# Patient Record
Sex: FEMALE | Race: WHITE | NOT HISPANIC OR LATINO
[De-identification: names, ages, dates, MRNs, and addresses within clinical notes are randomized per-mention and may not be internally consistent; named-entity substitution may affect disease eponyms.]

---

## 2017-07-26 ENCOUNTER — APPOINTMENT (OUTPATIENT)
Dept: ORTHOPEDIC SURGERY | Facility: CLINIC | Age: 72
End: 2017-07-26

## 2017-07-26 VITALS
SYSTOLIC BLOOD PRESSURE: 136 MMHG | WEIGHT: 170 LBS | HEART RATE: 73 BPM | BODY MASS INDEX: 26.37 KG/M2 | DIASTOLIC BLOOD PRESSURE: 75 MMHG | HEIGHT: 67.5 IN

## 2017-07-26 DIAGNOSIS — M21.6X9 OTHER ACQUIRED DEFORMITIES OF UNSPECIFIED FOOT: ICD-10-CM

## 2017-07-26 DIAGNOSIS — M25.572 PAIN IN LEFT ANKLE AND JOINTS OF LEFT FOOT: ICD-10-CM

## 2017-07-26 DIAGNOSIS — M21.40 FLAT FOOT [PES PLANUS] (ACQUIRED), UNSPECIFIED FOOT: ICD-10-CM

## 2017-07-28 ENCOUNTER — APPOINTMENT (OUTPATIENT)
Dept: MRI IMAGING | Facility: CLINIC | Age: 72
End: 2017-07-28
Payer: MEDICARE

## 2017-07-28 ENCOUNTER — OUTPATIENT (OUTPATIENT)
Dept: OUTPATIENT SERVICES | Facility: HOSPITAL | Age: 72
LOS: 1 days | End: 2017-07-28
Payer: MEDICARE

## 2017-07-28 DIAGNOSIS — Z00.8 ENCOUNTER FOR OTHER GENERAL EXAMINATION: ICD-10-CM

## 2017-07-28 PROCEDURE — 73721 MRI JNT OF LWR EXTRE W/O DYE: CPT | Mod: 26,LT

## 2017-07-28 PROCEDURE — 73721 MRI JNT OF LWR EXTRE W/O DYE: CPT

## 2017-07-31 ENCOUNTER — APPOINTMENT (OUTPATIENT)
Dept: OBGYN | Facility: CLINIC | Age: 72
End: 2017-07-31
Payer: MEDICARE

## 2017-07-31 ENCOUNTER — FORM ENCOUNTER (OUTPATIENT)
Age: 72
End: 2017-07-31

## 2017-07-31 VITALS — DIASTOLIC BLOOD PRESSURE: 82 MMHG | HEIGHT: 67.5 IN | SYSTOLIC BLOOD PRESSURE: 130 MMHG

## 2017-07-31 DIAGNOSIS — B97.7 PAPILLOMAVIRUS AS THE CAUSE OF DISEASES CLASSIFIED ELSEWHERE: ICD-10-CM

## 2017-07-31 DIAGNOSIS — Z01.810 ENCOUNTER FOR PREPROCEDURAL CARDIOVASCULAR EXAMINATION: ICD-10-CM

## 2017-07-31 PROCEDURE — G0101: CPT

## 2017-08-01 ENCOUNTER — APPOINTMENT (OUTPATIENT)
Dept: MAMMOGRAPHY | Facility: IMAGING CENTER | Age: 72
End: 2017-08-01
Payer: MEDICARE

## 2017-08-01 ENCOUNTER — OUTPATIENT (OUTPATIENT)
Dept: OUTPATIENT SERVICES | Facility: HOSPITAL | Age: 72
LOS: 1 days | End: 2017-08-01
Payer: MEDICARE

## 2017-08-01 DIAGNOSIS — Z12.31 ENCOUNTER FOR SCREENING MAMMOGRAM FOR MALIGNANT NEOPLASM OF BREAST: ICD-10-CM

## 2017-08-01 PROCEDURE — 77063 BREAST TOMOSYNTHESIS BI: CPT

## 2017-08-01 PROCEDURE — 77063 BREAST TOMOSYNTHESIS BI: CPT | Mod: 26

## 2017-08-01 PROCEDURE — G0202: CPT | Mod: 26

## 2017-08-01 PROCEDURE — 77067 SCR MAMMO BI INCL CAD: CPT

## 2017-08-02 ENCOUNTER — APPOINTMENT (OUTPATIENT)
Dept: ORTHOPEDIC SURGERY | Facility: CLINIC | Age: 72
End: 2017-08-02
Payer: MEDICARE

## 2017-08-02 DIAGNOSIS — M21.42 FLAT FOOT [PES PLANUS] (ACQUIRED), LEFT FOOT: ICD-10-CM

## 2017-08-02 PROCEDURE — 99214 OFFICE O/P EST MOD 30 MIN: CPT

## 2017-08-08 LAB — CYTOLOGY CVX/VAG DOC THIN PREP: NORMAL

## 2017-10-02 ENCOUNTER — OUTPATIENT (OUTPATIENT)
Dept: OUTPATIENT SERVICES | Facility: HOSPITAL | Age: 72
LOS: 1 days | End: 2017-10-02
Payer: MEDICARE

## 2017-10-02 VITALS
DIASTOLIC BLOOD PRESSURE: 74 MMHG | OXYGEN SATURATION: 96 % | WEIGHT: 195.11 LBS | HEART RATE: 73 BPM | TEMPERATURE: 99 F | SYSTOLIC BLOOD PRESSURE: 157 MMHG | HEIGHT: 67.5 IN | RESPIRATION RATE: 12 BRPM

## 2017-10-02 DIAGNOSIS — Z90.89 ACQUIRED ABSENCE OF OTHER ORGANS: Chronic | ICD-10-CM

## 2017-10-02 DIAGNOSIS — S85.219A: ICD-10-CM

## 2017-10-02 DIAGNOSIS — T14.8 OTHER INJURY OF UNSPECIFIED BODY REGION: ICD-10-CM

## 2017-10-02 DIAGNOSIS — M77.9 ENTHESOPATHY, UNSPECIFIED: Chronic | ICD-10-CM

## 2017-10-02 DIAGNOSIS — Z01.818 ENCOUNTER FOR OTHER PREPROCEDURAL EXAMINATION: ICD-10-CM

## 2017-10-02 DIAGNOSIS — Z98.890 OTHER SPECIFIED POSTPROCEDURAL STATES: Chronic | ICD-10-CM

## 2017-10-02 DIAGNOSIS — M65.9 SYNOVITIS AND TENOSYNOVITIS, UNSPECIFIED: ICD-10-CM

## 2017-10-02 DIAGNOSIS — Z90.49 ACQUIRED ABSENCE OF OTHER SPECIFIED PARTS OF DIGESTIVE TRACT: Chronic | ICD-10-CM

## 2017-10-02 NOTE — H&P PST ADULT - NSANTHOSAYNRD_GEN_A_CORE
No. VÍCTOR screening performed.  STOP BANG Legend: 0-2 = LOW Risk; 3-4 = INTERMEDIATE Risk; 5-8 = HIGH Risk

## 2017-10-02 NOTE — H&P PST ADULT - PROBLEM SELECTOR PLAN 1
coming in for operative repair left tibialis anterior tendon  insufficiency. Potential reconstruction procedure with allograft tendon

## 2017-10-02 NOTE — H&P PST ADULT - HISTORY OF PRESENT ILLNESS
72yr old female with ruptured tendon coming in for operative  repair left tibialis anterior tendon insufficiency. Potential  reconstruction procedure with allograft tendon

## 2017-10-04 ENCOUNTER — APPOINTMENT (OUTPATIENT)
Dept: CARDIOLOGY | Facility: CLINIC | Age: 72
End: 2017-10-04
Payer: MEDICARE

## 2017-10-04 ENCOUNTER — NON-APPOINTMENT (OUTPATIENT)
Age: 72
End: 2017-10-04

## 2017-10-04 VITALS
HEART RATE: 66 BPM | SYSTOLIC BLOOD PRESSURE: 147 MMHG | WEIGHT: 194 LBS | DIASTOLIC BLOOD PRESSURE: 66 MMHG | HEIGHT: 67 IN | BODY MASS INDEX: 30.45 KG/M2 | OXYGEN SATURATION: 97 %

## 2017-10-04 VITALS — SYSTOLIC BLOOD PRESSURE: 130 MMHG | DIASTOLIC BLOOD PRESSURE: 70 MMHG

## 2017-10-04 DIAGNOSIS — H26.9 UNSPECIFIED CATARACT: ICD-10-CM

## 2017-10-04 DIAGNOSIS — Z82.49 FAMILY HISTORY OF ISCHEMIC HEART DISEASE AND OTHER DISEASES OF THE CIRCULATORY SYSTEM: ICD-10-CM

## 2017-10-04 DIAGNOSIS — Z01.419 ENCOUNTER FOR GYNECOLOGICAL EXAMINATION (GENERAL) (ROUTINE) W/OUT ABNORMAL FINDINGS: ICD-10-CM

## 2017-10-04 PROCEDURE — 93000 ELECTROCARDIOGRAM COMPLETE: CPT

## 2017-10-04 PROCEDURE — 99214 OFFICE O/P EST MOD 30 MIN: CPT

## 2017-10-13 ENCOUNTER — OUTPATIENT (OUTPATIENT)
Dept: OUTPATIENT SERVICES | Facility: HOSPITAL | Age: 72
LOS: 1 days | End: 2017-10-13
Payer: MEDICARE

## 2017-10-13 ENCOUNTER — APPOINTMENT (OUTPATIENT)
Dept: ORTHOPEDIC SURGERY | Facility: HOSPITAL | Age: 72
End: 2017-10-13

## 2017-10-13 VITALS
DIASTOLIC BLOOD PRESSURE: 68 MMHG | TEMPERATURE: 98 F | SYSTOLIC BLOOD PRESSURE: 125 MMHG | HEART RATE: 75 BPM | OXYGEN SATURATION: 97 % | RESPIRATION RATE: 16 BRPM

## 2017-10-13 VITALS
HEART RATE: 86 BPM | OXYGEN SATURATION: 98 % | RESPIRATION RATE: 18 BRPM | WEIGHT: 195.11 LBS | SYSTOLIC BLOOD PRESSURE: 164 MMHG | HEIGHT: 67.5 IN | DIASTOLIC BLOOD PRESSURE: 78 MMHG | TEMPERATURE: 98 F

## 2017-10-13 DIAGNOSIS — M77.9 ENTHESOPATHY, UNSPECIFIED: Chronic | ICD-10-CM

## 2017-10-13 DIAGNOSIS — S85.219A: ICD-10-CM

## 2017-10-13 DIAGNOSIS — Z90.89 ACQUIRED ABSENCE OF OTHER ORGANS: Chronic | ICD-10-CM

## 2017-10-13 DIAGNOSIS — Z98.890 OTHER SPECIFIED POSTPROCEDURAL STATES: Chronic | ICD-10-CM

## 2017-10-13 DIAGNOSIS — Z90.49 ACQUIRED ABSENCE OF OTHER SPECIFIED PARTS OF DIGESTIVE TRACT: Chronic | ICD-10-CM

## 2017-10-13 DIAGNOSIS — M65.9 SYNOVITIS AND TENOSYNOVITIS, UNSPECIFIED: ICD-10-CM

## 2017-10-13 PROCEDURE — 76000 FLUOROSCOPY <1 HR PHYS/QHP: CPT

## 2017-10-13 PROCEDURE — 27691 REVISE LOWER LEG TENDON: CPT | Mod: LT

## 2017-10-13 PROCEDURE — G0463: CPT

## 2017-10-13 PROCEDURE — C1762: CPT

## 2017-10-13 PROCEDURE — C1713: CPT

## 2017-10-13 PROCEDURE — 27665 REPAIR OF LEG TENDON EACH: CPT | Mod: LT

## 2017-10-13 RX ORDER — SODIUM CHLORIDE 9 MG/ML
3 INJECTION INTRAMUSCULAR; INTRAVENOUS; SUBCUTANEOUS EVERY 8 HOURS
Qty: 0 | Refills: 0 | Status: DISCONTINUED | OUTPATIENT
Start: 2017-10-13 | End: 2017-10-13

## 2017-10-13 RX ORDER — HYDROMORPHONE HYDROCHLORIDE 2 MG/ML
0.25 INJECTION INTRAMUSCULAR; INTRAVENOUS; SUBCUTANEOUS
Qty: 0 | Refills: 0 | Status: DISCONTINUED | OUTPATIENT
Start: 2017-10-13 | End: 2017-10-13

## 2017-10-13 RX ORDER — OXYCODONE HYDROCHLORIDE 5 MG/1
1 TABLET ORAL
Qty: 28 | Refills: 0
Start: 2017-10-13 | End: 2017-10-20

## 2017-10-13 RX ORDER — ASPIRIN/CALCIUM CARB/MAGNESIUM 324 MG
1 TABLET ORAL
Qty: 30 | Refills: 0 | OUTPATIENT
Start: 2017-10-13 | End: 2017-11-12

## 2017-10-13 RX ORDER — OXYCODONE HYDROCHLORIDE 5 MG/1
1 TABLET ORAL
Qty: 28 | Refills: 0 | OUTPATIENT
Start: 2017-10-13 | End: 2017-10-20

## 2017-10-13 RX ORDER — ASPIRIN/CALCIUM CARB/MAGNESIUM 324 MG
1 TABLET ORAL
Qty: 30 | Refills: 0
Start: 2017-10-13 | End: 2017-11-12

## 2017-10-13 RX ORDER — FAMOTIDINE 10 MG/ML
0 INJECTION INTRAVENOUS
Qty: 0 | Refills: 0 | COMMUNITY

## 2017-10-13 NOTE — ASU DISCHARGE PLAN (ADULT/PEDIATRIC). - NOTIFY
Bleeding that does not stop/Swelling that continues/Numbness, tingling/Numbness, color, or temperature change to extremity/Fever greater than 101/Pain not relieved by Medications

## 2017-10-13 NOTE — BRIEF OPERATIVE NOTE - PROCEDURE
<<-----Click on this checkbox to enter Procedure Reconstruction of anterior or posterior tibialis tendon  10/13/2017  left  Active  KYNGSTROM

## 2017-10-13 NOTE — ASU DISCHARGE PLAN (ADULT/PEDIATRIC). - MEDICATION SUMMARY - MEDICATIONS TO TAKE
I will START or STAY ON the medications listed below when I get home from the hospital:    Ecotrin 325 mg oral delayed release tablet  -- 1 tab(s) by mouth once a day for DVT ppx  -- Swallow whole.  Do not crush.  Take with food or milk.    -- Indication: For dvt ppx    acetaminophen-oxyCODONE 325 mg-5 mg oral tablet  -- 1 tab(s) by mouth every 6 hours, As Needed MDD:4 for pain  -- Caution federal law prohibits the transfer of this drug to any person other  than the person for whom it was prescribed.  May cause drowsiness.  Alcohol may intensify this effect.  Use care when operating dangerous machinery.  This prescription cannot be refilled.  This product contains acetaminophen.  Do not use  with any other product containing acetaminophen to prevent possible liver damage.  Using more of this medication than prescribed may cause serious breathing problems.    -- Indication: For prn pain    Advair Diskus 100 mcg-50 mcg inhalation powder  -- 1 puff(s) inhaled 2 times a day  -- Indication: For per pmd

## 2017-10-13 NOTE — ASU DISCHARGE PLAN (ADULT/PEDIATRIC). - MEDICATION SUMMARY - MEDICATIONS TO STOP TAKING
I will STOP taking the medications listed below when I get home from the hospital:    famotidine 20 mg oral tablet  -- orally once a day pm and am of surgery

## 2017-10-16 ENCOUNTER — TRANSCRIPTION ENCOUNTER (OUTPATIENT)
Age: 72
End: 2017-10-16

## 2017-10-25 ENCOUNTER — APPOINTMENT (OUTPATIENT)
Dept: ORTHOPEDIC SURGERY | Facility: CLINIC | Age: 72
End: 2017-10-25
Payer: MEDICARE

## 2017-10-25 PROCEDURE — 73630 X-RAY EXAM OF FOOT: CPT | Mod: LT

## 2017-10-25 PROCEDURE — 99024 POSTOP FOLLOW-UP VISIT: CPT

## 2017-10-25 RX ORDER — HYDROCODONE BITARTRATE AND ACETAMINOPHEN 5; 325 MG/1; MG/1
5-325 TABLET ORAL
Qty: 5 | Refills: 0 | Status: COMPLETED | COMMUNITY
Start: 2017-04-29

## 2017-10-25 RX ORDER — OXYCODONE AND ACETAMINOPHEN 5; 325 MG/1; MG/1
5-325 TABLET ORAL
Qty: 28 | Refills: 0 | Status: ACTIVE | COMMUNITY
Start: 2017-10-13

## 2017-10-25 RX ORDER — IBUPROFEN 600 MG/1
600 TABLET, FILM COATED ORAL
Qty: 30 | Refills: 0 | Status: ACTIVE | COMMUNITY
Start: 2017-04-29

## 2017-10-30 ENCOUNTER — TRANSCRIPTION ENCOUNTER (OUTPATIENT)
Age: 72
End: 2017-10-30

## 2017-11-22 ENCOUNTER — APPOINTMENT (OUTPATIENT)
Dept: ORTHOPEDIC SURGERY | Facility: CLINIC | Age: 72
End: 2017-11-22
Payer: MEDICARE

## 2017-11-22 PROCEDURE — 29405 APPL SHORT LEG CAST: CPT | Mod: 58

## 2017-11-22 PROCEDURE — 99024 POSTOP FOLLOW-UP VISIT: CPT

## 2017-11-27 NOTE — H&P PST ADULT - PSH
abdominal pain
Bone spur  Lt foot 5th digit 2014  H/O bilateral breast biopsy  2002  History of cholecystectomy  2000  S/P tonsillectomy  childhood

## 2017-12-13 ENCOUNTER — APPOINTMENT (OUTPATIENT)
Dept: ORTHOPEDIC SURGERY | Facility: CLINIC | Age: 72
End: 2017-12-13
Payer: MEDICARE

## 2017-12-13 PROCEDURE — 99024 POSTOP FOLLOW-UP VISIT: CPT

## 2018-01-10 ENCOUNTER — APPOINTMENT (OUTPATIENT)
Dept: ORTHOPEDIC SURGERY | Facility: CLINIC | Age: 73
End: 2018-01-10
Payer: MEDICARE

## 2018-01-10 DIAGNOSIS — M66.9 SPONTANEOUS RUPTURE OF UNSPECIFIED TENDON: ICD-10-CM

## 2018-01-10 PROCEDURE — 99024 POSTOP FOLLOW-UP VISIT: CPT

## 2018-02-28 ENCOUNTER — APPOINTMENT (OUTPATIENT)
Dept: ORTHOPEDIC SURGERY | Facility: CLINIC | Age: 73
End: 2018-02-28
Payer: MEDICARE

## 2018-02-28 PROCEDURE — 99213 OFFICE O/P EST LOW 20 MIN: CPT

## 2018-04-11 ENCOUNTER — APPOINTMENT (OUTPATIENT)
Dept: ORTHOPEDIC SURGERY | Facility: CLINIC | Age: 73
End: 2018-04-11
Payer: MEDICARE

## 2018-04-11 VITALS
SYSTOLIC BLOOD PRESSURE: 155 MMHG | WEIGHT: 194 LBS | HEIGHT: 67 IN | HEART RATE: 83 BPM | BODY MASS INDEX: 30.45 KG/M2 | DIASTOLIC BLOOD PRESSURE: 73 MMHG

## 2018-04-11 DIAGNOSIS — M25.562 PAIN IN LEFT KNEE: ICD-10-CM

## 2018-04-11 PROCEDURE — 99214 OFFICE O/P EST MOD 30 MIN: CPT

## 2018-04-11 PROCEDURE — 73564 X-RAY EXAM KNEE 4 OR MORE: CPT | Mod: LT

## 2018-04-11 RX ORDER — BROMFENAC SODIUM 0.7 MG/ML
0.07 SOLUTION/ DROPS OPHTHALMIC
Qty: 3 | Refills: 0 | Status: ACTIVE | COMMUNITY
Start: 2018-03-08

## 2018-04-11 RX ORDER — CYCLOPENTOLATE HYDROCHLORIDE 20 MG/ML
2 SOLUTION/ DROPS OPHTHALMIC
Qty: 2 | Refills: 0 | Status: ACTIVE | COMMUNITY
Start: 2018-03-08

## 2018-04-11 RX ORDER — PREDNISOLONE ACETATE 10 MG/ML
1 SUSPENSION/ DROPS OPHTHALMIC
Qty: 5 | Refills: 0 | Status: ACTIVE | COMMUNITY
Start: 2018-03-12

## 2018-04-11 RX ORDER — MOXIFLOXACIN OPHTHALMIC 5 MG/ML
0.5 SOLUTION/ DROPS OPHTHALMIC
Qty: 3 | Refills: 0 | Status: ACTIVE | COMMUNITY
Start: 2018-03-26

## 2018-04-19 ENCOUNTER — APPOINTMENT (OUTPATIENT)
Dept: MRI IMAGING | Facility: CLINIC | Age: 73
End: 2018-04-19
Payer: MEDICARE

## 2018-04-19 ENCOUNTER — OUTPATIENT (OUTPATIENT)
Dept: OUTPATIENT SERVICES | Facility: HOSPITAL | Age: 73
LOS: 1 days | End: 2018-04-19
Payer: MEDICARE

## 2018-04-19 DIAGNOSIS — Z98.890 OTHER SPECIFIED POSTPROCEDURAL STATES: Chronic | ICD-10-CM

## 2018-04-19 DIAGNOSIS — M77.9 ENTHESOPATHY, UNSPECIFIED: Chronic | ICD-10-CM

## 2018-04-19 DIAGNOSIS — Z90.49 ACQUIRED ABSENCE OF OTHER SPECIFIED PARTS OF DIGESTIVE TRACT: Chronic | ICD-10-CM

## 2018-04-19 DIAGNOSIS — Z90.89 ACQUIRED ABSENCE OF OTHER ORGANS: Chronic | ICD-10-CM

## 2018-04-19 DIAGNOSIS — Z00.8 ENCOUNTER FOR OTHER GENERAL EXAMINATION: ICD-10-CM

## 2018-04-19 PROCEDURE — 73721 MRI JNT OF LWR EXTRE W/O DYE: CPT | Mod: 26,LT

## 2018-04-19 PROCEDURE — 73721 MRI JNT OF LWR EXTRE W/O DYE: CPT

## 2018-04-23 ENCOUNTER — APPOINTMENT (OUTPATIENT)
Dept: ORTHOPEDIC SURGERY | Facility: CLINIC | Age: 73
End: 2018-04-23
Payer: MEDICARE

## 2018-04-23 DIAGNOSIS — Z78.9 OTHER SPECIFIED HEALTH STATUS: ICD-10-CM

## 2018-04-23 DIAGNOSIS — S83.207A UNSPECIFIED TEAR OF UNSPECIFIED MENISCUS, CURRENT INJURY, LEFT KNEE, INITIAL ENCOUNTER: ICD-10-CM

## 2018-04-23 DIAGNOSIS — Z80.3 FAMILY HISTORY OF MALIGNANT NEOPLASM OF BREAST: ICD-10-CM

## 2018-04-23 DIAGNOSIS — Z87.891 PERSONAL HISTORY OF NICOTINE DEPENDENCE: ICD-10-CM

## 2018-04-23 DIAGNOSIS — Z82.62 FAMILY HISTORY OF OSTEOPOROSIS: ICD-10-CM

## 2018-04-23 PROCEDURE — 99213 OFFICE O/P EST LOW 20 MIN: CPT | Mod: 25

## 2018-04-23 PROCEDURE — 20610 DRAIN/INJ JOINT/BURSA W/O US: CPT | Mod: LT

## 2018-04-24 ENCOUNTER — MED ADMIN CHARGE (OUTPATIENT)
Age: 73
End: 2018-04-24

## 2018-04-25 ENCOUNTER — APPOINTMENT (OUTPATIENT)
Dept: ORTHOPEDIC SURGERY | Facility: CLINIC | Age: 73
End: 2018-04-25
Payer: MEDICARE

## 2018-04-25 DIAGNOSIS — S86.219A STRAIN OF MUSCLE(S) AND TENDON(S) OF ANTERIOR MUSCLE GROUP AT LOWER LEG LEVEL, UNSPECIFIED LEG, INITIAL ENCOUNTER: ICD-10-CM

## 2018-04-25 DIAGNOSIS — S86.812D STRAIN OF OTHER MUSCLE(S) AND TENDON(S) AT LOWER LEG LEVEL, LEFT LEG, SUBSEQUENT ENCOUNTER: ICD-10-CM

## 2018-04-25 PROCEDURE — 99213 OFFICE O/P EST LOW 20 MIN: CPT

## 2018-06-12 PROBLEM — M25.561 ACUTE PAIN OF RIGHT KNEE: Status: ACTIVE | Noted: 2018-06-12

## 2018-06-13 ENCOUNTER — APPOINTMENT (OUTPATIENT)
Dept: ORTHOPEDIC SURGERY | Facility: CLINIC | Age: 73
End: 2018-06-13
Payer: MEDICARE

## 2018-06-13 VITALS — BODY MASS INDEX: 32.39 KG/M2 | WEIGHT: 206.4 LBS | HEIGHT: 67 IN

## 2018-06-13 DIAGNOSIS — M17.11 UNILATERAL PRIMARY OSTEOARTHRITIS, RIGHT KNEE: ICD-10-CM

## 2018-06-13 DIAGNOSIS — M25.561 PAIN IN RIGHT KNEE: ICD-10-CM

## 2018-06-13 PROCEDURE — 73564 X-RAY EXAM KNEE 4 OR MORE: CPT | Mod: RT

## 2018-06-13 PROCEDURE — 20610 DRAIN/INJ JOINT/BURSA W/O US: CPT | Mod: RT

## 2018-06-13 PROCEDURE — 99214 OFFICE O/P EST MOD 30 MIN: CPT | Mod: 25

## 2018-06-13 RX ORDER — MELOXICAM 15 MG/1
15 TABLET ORAL DAILY
Qty: 30 | Refills: 2 | Status: ACTIVE | COMMUNITY
Start: 2018-06-13 | End: 1900-01-01

## 2018-07-03 ENCOUNTER — APPOINTMENT (OUTPATIENT)
Dept: ORTHOPEDIC SURGERY | Facility: CLINIC | Age: 73
End: 2018-07-03
Payer: MEDICARE

## 2018-07-03 VITALS — HEIGHT: 67 IN | WEIGHT: 206 LBS | BODY MASS INDEX: 32.33 KG/M2

## 2018-07-03 DIAGNOSIS — M70.51 OTHER BURSITIS OF KNEE, RIGHT KNEE: ICD-10-CM

## 2018-07-03 PROCEDURE — 20610 DRAIN/INJ JOINT/BURSA W/O US: CPT | Mod: RT

## 2018-07-03 PROCEDURE — 99213 OFFICE O/P EST LOW 20 MIN: CPT | Mod: 25

## 2018-07-12 ENCOUNTER — CHART COPY (OUTPATIENT)
Age: 73
End: 2018-07-12

## 2018-08-02 ENCOUNTER — FORM ENCOUNTER (OUTPATIENT)
Age: 73
End: 2018-08-02

## 2018-08-03 ENCOUNTER — APPOINTMENT (OUTPATIENT)
Dept: OBGYN | Facility: CLINIC | Age: 73
End: 2018-08-03
Payer: MEDICARE

## 2018-08-03 ENCOUNTER — OUTPATIENT (OUTPATIENT)
Dept: OUTPATIENT SERVICES | Facility: HOSPITAL | Age: 73
LOS: 1 days | End: 2018-08-03
Payer: MEDICARE

## 2018-08-03 ENCOUNTER — APPOINTMENT (OUTPATIENT)
Dept: MAMMOGRAPHY | Facility: IMAGING CENTER | Age: 73
End: 2018-08-03
Payer: MEDICARE

## 2018-08-03 VITALS
HEIGHT: 68 IN | SYSTOLIC BLOOD PRESSURE: 156 MMHG | BODY MASS INDEX: 29.4 KG/M2 | WEIGHT: 194 LBS | DIASTOLIC BLOOD PRESSURE: 62 MMHG

## 2018-08-03 DIAGNOSIS — Z86.19 PERSONAL HISTORY OF OTHER INFECTIOUS AND PARASITIC DISEASES: ICD-10-CM

## 2018-08-03 DIAGNOSIS — Z90.89 ACQUIRED ABSENCE OF OTHER ORGANS: Chronic | ICD-10-CM

## 2018-08-03 DIAGNOSIS — Z00.00 ENCOUNTER FOR GENERAL ADULT MEDICAL EXAMINATION WITHOUT ABNORMAL FINDINGS: ICD-10-CM

## 2018-08-03 DIAGNOSIS — M77.9 ENTHESOPATHY, UNSPECIFIED: Chronic | ICD-10-CM

## 2018-08-03 DIAGNOSIS — Z90.49 ACQUIRED ABSENCE OF OTHER SPECIFIED PARTS OF DIGESTIVE TRACT: Chronic | ICD-10-CM

## 2018-08-03 DIAGNOSIS — Z86.12 PERSONAL HISTORY OF POLIOMYELITIS: ICD-10-CM

## 2018-08-03 DIAGNOSIS — Z98.890 OTHER SPECIFIED POSTPROCEDURAL STATES: Chronic | ICD-10-CM

## 2018-08-03 PROBLEM — J44.9 CHRONIC OBSTRUCTIVE PULMONARY DISEASE, UNSPECIFIED: Chronic | Status: ACTIVE | Noted: 2017-10-02

## 2018-08-03 PROCEDURE — 77067 SCR MAMMO BI INCL CAD: CPT | Mod: 26

## 2018-08-03 PROCEDURE — 77067 SCR MAMMO BI INCL CAD: CPT

## 2018-08-03 PROCEDURE — 77063 BREAST TOMOSYNTHESIS BI: CPT

## 2018-08-03 PROCEDURE — 77063 BREAST TOMOSYNTHESIS BI: CPT | Mod: 26

## 2018-08-03 PROCEDURE — G0101: CPT

## 2018-08-06 ENCOUNTER — FORM ENCOUNTER (OUTPATIENT)
Age: 73
End: 2018-08-06

## 2018-08-07 ENCOUNTER — APPOINTMENT (OUTPATIENT)
Dept: MAMMOGRAPHY | Facility: IMAGING CENTER | Age: 73
End: 2018-08-07
Payer: MEDICARE

## 2018-08-07 ENCOUNTER — OUTPATIENT (OUTPATIENT)
Dept: OUTPATIENT SERVICES | Facility: HOSPITAL | Age: 73
LOS: 1 days | End: 2018-08-07
Payer: MEDICARE

## 2018-08-07 ENCOUNTER — APPOINTMENT (OUTPATIENT)
Dept: ULTRASOUND IMAGING | Facility: IMAGING CENTER | Age: 73
End: 2018-08-07
Payer: MEDICARE

## 2018-08-07 DIAGNOSIS — Z98.890 OTHER SPECIFIED POSTPROCEDURAL STATES: Chronic | ICD-10-CM

## 2018-08-07 DIAGNOSIS — Z90.89 ACQUIRED ABSENCE OF OTHER ORGANS: Chronic | ICD-10-CM

## 2018-08-07 DIAGNOSIS — Z90.49 ACQUIRED ABSENCE OF OTHER SPECIFIED PARTS OF DIGESTIVE TRACT: Chronic | ICD-10-CM

## 2018-08-07 DIAGNOSIS — M77.9 ENTHESOPATHY, UNSPECIFIED: Chronic | ICD-10-CM

## 2018-08-07 DIAGNOSIS — R92.8 OTHER ABNORMAL AND INCONCLUSIVE FINDINGS ON DIAGNOSTIC IMAGING OF BREAST: ICD-10-CM

## 2018-08-07 PROCEDURE — 77065 DX MAMMO INCL CAD UNI: CPT | Mod: 26,RT

## 2018-08-07 PROCEDURE — 76642 ULTRASOUND BREAST LIMITED: CPT

## 2018-08-07 PROCEDURE — G0279: CPT | Mod: 26

## 2018-08-07 PROCEDURE — G0279: CPT

## 2018-08-07 PROCEDURE — 77065 DX MAMMO INCL CAD UNI: CPT

## 2018-08-07 PROCEDURE — 76642 ULTRASOUND BREAST LIMITED: CPT | Mod: 26,RT

## 2019-03-09 ENCOUNTER — TRANSCRIPTION ENCOUNTER (OUTPATIENT)
Age: 74
End: 2019-03-09

## 2019-07-07 PROBLEM — M21.42 ACQUIRED LEFT FLAT FOOT: Status: ACTIVE | Noted: 2017-07-26

## 2019-07-07 PROBLEM — M21.40 ACQUIRED PES PLANUS: Status: ACTIVE | Noted: 2017-07-26

## 2019-08-08 ENCOUNTER — FORM ENCOUNTER (OUTPATIENT)
Age: 74
End: 2019-08-08

## 2019-08-09 ENCOUNTER — OUTPATIENT (OUTPATIENT)
Dept: OUTPATIENT SERVICES | Facility: HOSPITAL | Age: 74
LOS: 1 days | End: 2019-08-09
Payer: MEDICARE

## 2019-08-09 ENCOUNTER — APPOINTMENT (OUTPATIENT)
Dept: MAMMOGRAPHY | Facility: CLINIC | Age: 74
End: 2019-08-09

## 2019-08-09 DIAGNOSIS — Z00.8 ENCOUNTER FOR OTHER GENERAL EXAMINATION: ICD-10-CM

## 2019-08-09 DIAGNOSIS — Z90.49 ACQUIRED ABSENCE OF OTHER SPECIFIED PARTS OF DIGESTIVE TRACT: Chronic | ICD-10-CM

## 2019-08-09 DIAGNOSIS — Z90.89 ACQUIRED ABSENCE OF OTHER ORGANS: Chronic | ICD-10-CM

## 2019-08-09 DIAGNOSIS — Z98.890 OTHER SPECIFIED POSTPROCEDURAL STATES: Chronic | ICD-10-CM

## 2019-08-09 DIAGNOSIS — M77.9 ENTHESOPATHY, UNSPECIFIED: Chronic | ICD-10-CM

## 2019-08-09 PROCEDURE — 77067 SCR MAMMO BI INCL CAD: CPT

## 2019-08-09 PROCEDURE — 77063 BREAST TOMOSYNTHESIS BI: CPT | Mod: 26

## 2019-08-09 PROCEDURE — 77067 SCR MAMMO BI INCL CAD: CPT | Mod: 26

## 2019-08-09 PROCEDURE — 77063 BREAST TOMOSYNTHESIS BI: CPT

## 2020-08-18 ENCOUNTER — APPOINTMENT (OUTPATIENT)
Dept: OBGYN | Facility: CLINIC | Age: 75
End: 2020-08-18
Payer: MEDICARE

## 2020-08-18 VITALS
BODY MASS INDEX: 26.27 KG/M2 | WEIGHT: 173.31 LBS | HEIGHT: 68 IN | SYSTOLIC BLOOD PRESSURE: 147 MMHG | DIASTOLIC BLOOD PRESSURE: 87 MMHG

## 2020-08-18 DIAGNOSIS — Z01.810 ENCOUNTER FOR PREPROCEDURAL CARDIOVASCULAR EXAMINATION: ICD-10-CM

## 2020-08-18 DIAGNOSIS — Z01.419 ENCOUNTER FOR GYNECOLOGICAL EXAMINATION (GENERAL) (ROUTINE) W/OUT ABNORMAL FINDINGS: ICD-10-CM

## 2020-08-18 PROCEDURE — G0101: CPT

## 2020-08-19 ENCOUNTER — APPOINTMENT (OUTPATIENT)
Dept: MAMMOGRAPHY | Facility: CLINIC | Age: 75
End: 2020-08-19
Payer: MEDICARE

## 2020-08-19 ENCOUNTER — OUTPATIENT (OUTPATIENT)
Dept: OUTPATIENT SERVICES | Facility: HOSPITAL | Age: 75
LOS: 1 days | End: 2020-08-19
Payer: MEDICARE

## 2020-08-19 ENCOUNTER — RESULT REVIEW (OUTPATIENT)
Age: 75
End: 2020-08-19

## 2020-08-19 DIAGNOSIS — Z90.89 ACQUIRED ABSENCE OF OTHER ORGANS: Chronic | ICD-10-CM

## 2020-08-19 DIAGNOSIS — Z98.890 OTHER SPECIFIED POSTPROCEDURAL STATES: Chronic | ICD-10-CM

## 2020-08-19 DIAGNOSIS — Z00.8 ENCOUNTER FOR OTHER GENERAL EXAMINATION: ICD-10-CM

## 2020-08-19 DIAGNOSIS — M77.9 ENTHESOPATHY, UNSPECIFIED: Chronic | ICD-10-CM

## 2020-08-19 DIAGNOSIS — Z90.49 ACQUIRED ABSENCE OF OTHER SPECIFIED PARTS OF DIGESTIVE TRACT: Chronic | ICD-10-CM

## 2020-08-19 PROCEDURE — 77063 BREAST TOMOSYNTHESIS BI: CPT | Mod: 26

## 2020-08-19 PROCEDURE — 77067 SCR MAMMO BI INCL CAD: CPT

## 2020-08-19 PROCEDURE — 77063 BREAST TOMOSYNTHESIS BI: CPT

## 2020-08-19 PROCEDURE — 77067 SCR MAMMO BI INCL CAD: CPT | Mod: 26

## 2020-08-20 ENCOUNTER — APPOINTMENT (OUTPATIENT)
Dept: ORTHOPEDIC SURGERY | Facility: CLINIC | Age: 75
End: 2020-08-20
Payer: MEDICARE

## 2020-08-20 PROCEDURE — 99203 OFFICE O/P NEW LOW 30 MIN: CPT | Mod: 25

## 2020-08-20 PROCEDURE — 20550 NJX 1 TENDON SHEATH/LIGAMENT: CPT | Mod: F7

## 2020-08-24 LAB — CYTOLOGY CVX/VAG DOC THIN PREP: ABNORMAL

## 2020-12-23 PROBLEM — Z01.419 ENCOUNTER FOR GYNECOLOGICAL EXAMINATION: Status: RESOLVED | Noted: 2020-08-18 | Resolved: 2020-12-23

## 2021-04-29 ENCOUNTER — NON-APPOINTMENT (OUTPATIENT)
Age: 76
End: 2021-04-29

## 2021-04-29 ENCOUNTER — APPOINTMENT (OUTPATIENT)
Dept: ORTHOPEDIC SURGERY | Facility: CLINIC | Age: 76
End: 2021-04-29
Payer: MEDICARE

## 2021-04-29 DIAGNOSIS — M65.331 TRIGGER FINGER, RIGHT MIDDLE FINGER: ICD-10-CM

## 2021-04-29 DIAGNOSIS — M24.542 CONTRACTURE, LEFT HAND: ICD-10-CM

## 2021-04-29 DIAGNOSIS — M65.332 TRIGGER FINGER, LEFT MIDDLE FINGER: ICD-10-CM

## 2021-04-29 PROCEDURE — 20550 NJX 1 TENDON SHEATH/LIGAMENT: CPT | Mod: F2

## 2021-04-29 PROCEDURE — 99214 OFFICE O/P EST MOD 30 MIN: CPT | Mod: 25

## 2021-08-16 DIAGNOSIS — Z00.00 ENCOUNTER FOR GENERAL ADULT MEDICAL EXAMINATION W/OUT ABNORMAL FINDINGS: ICD-10-CM

## 2021-08-24 ENCOUNTER — OUTPATIENT (OUTPATIENT)
Dept: OUTPATIENT SERVICES | Facility: HOSPITAL | Age: 76
LOS: 1 days | End: 2021-08-24
Payer: MEDICARE

## 2021-08-24 ENCOUNTER — APPOINTMENT (OUTPATIENT)
Dept: MAMMOGRAPHY | Facility: CLINIC | Age: 76
End: 2021-08-24
Payer: MEDICARE

## 2021-08-24 ENCOUNTER — RESULT REVIEW (OUTPATIENT)
Age: 76
End: 2021-08-24

## 2021-08-24 DIAGNOSIS — Z90.89 ACQUIRED ABSENCE OF OTHER ORGANS: Chronic | ICD-10-CM

## 2021-08-24 DIAGNOSIS — Z98.890 OTHER SPECIFIED POSTPROCEDURAL STATES: Chronic | ICD-10-CM

## 2021-08-24 DIAGNOSIS — Z90.49 ACQUIRED ABSENCE OF OTHER SPECIFIED PARTS OF DIGESTIVE TRACT: Chronic | ICD-10-CM

## 2021-08-24 DIAGNOSIS — M77.9 ENTHESOPATHY, UNSPECIFIED: Chronic | ICD-10-CM

## 2021-08-24 DIAGNOSIS — Z00.00 ENCOUNTER FOR GENERAL ADULT MEDICAL EXAMINATION WITHOUT ABNORMAL FINDINGS: ICD-10-CM

## 2021-08-24 PROCEDURE — 77067 SCR MAMMO BI INCL CAD: CPT | Mod: 26

## 2021-08-24 PROCEDURE — 77063 BREAST TOMOSYNTHESIS BI: CPT | Mod: 26

## 2021-08-24 PROCEDURE — 77063 BREAST TOMOSYNTHESIS BI: CPT

## 2021-08-24 PROCEDURE — 77067 SCR MAMMO BI INCL CAD: CPT

## 2022-01-01 ENCOUNTER — INPATIENT (INPATIENT)
Facility: HOSPITAL | Age: 77
LOS: 14 days | DRG: 175 | End: 2022-09-28
Attending: INTERNAL MEDICINE | Admitting: INTERNAL MEDICINE
Payer: MEDICARE

## 2022-01-01 ENCOUNTER — TRANSCRIPTION ENCOUNTER (OUTPATIENT)
Age: 77
End: 2022-01-01

## 2022-01-01 ENCOUNTER — APPOINTMENT (OUTPATIENT)
Dept: GERIATRICS | Facility: CLINIC | Age: 77
End: 2022-01-01

## 2022-01-01 ENCOUNTER — OUTPATIENT (OUTPATIENT)
Dept: OUTPATIENT SERVICES | Facility: HOSPITAL | Age: 77
LOS: 1 days | Discharge: HOME | End: 2022-01-01

## 2022-01-01 ENCOUNTER — APPOINTMENT (OUTPATIENT)
Dept: RADIATION ONCOLOGY | Facility: CLINIC | Age: 77
End: 2022-01-01

## 2022-01-01 ENCOUNTER — APPOINTMENT (OUTPATIENT)
Dept: MAMMOGRAPHY | Facility: CLINIC | Age: 77
End: 2022-01-01

## 2022-01-01 VITALS
HEIGHT: 67.5 IN | SYSTOLIC BLOOD PRESSURE: 108 MMHG | RESPIRATION RATE: 18 BRPM | HEART RATE: 104 BPM | TEMPERATURE: 98 F | WEIGHT: 121.92 LBS | DIASTOLIC BLOOD PRESSURE: 56 MMHG | OXYGEN SATURATION: 93 %

## 2022-01-01 VITALS
SYSTOLIC BLOOD PRESSURE: 52 MMHG | HEART RATE: 107 BPM | RESPIRATION RATE: 20 BRPM | DIASTOLIC BLOOD PRESSURE: 33 MMHG | TEMPERATURE: 98 F | OXYGEN SATURATION: 93 %

## 2022-01-01 DIAGNOSIS — Z51.5 ENCOUNTER FOR PALLIATIVE CARE: ICD-10-CM

## 2022-01-01 DIAGNOSIS — Z90.49 ACQUIRED ABSENCE OF OTHER SPECIFIED PARTS OF DIGESTIVE TRACT: Chronic | ICD-10-CM

## 2022-01-01 DIAGNOSIS — R53.2 FUNCTIONAL QUADRIPLEGIA: ICD-10-CM

## 2022-01-01 DIAGNOSIS — R06.00 DYSPNEA, UNSPECIFIED: ICD-10-CM

## 2022-01-01 DIAGNOSIS — G89.3 NEOPLASM RELATED PAIN (ACUTE) (CHRONIC): ICD-10-CM

## 2022-01-01 DIAGNOSIS — Z98.890 OTHER SPECIFIED POSTPROCEDURAL STATES: Chronic | ICD-10-CM

## 2022-01-01 DIAGNOSIS — Z90.89 ACQUIRED ABSENCE OF OTHER ORGANS: Chronic | ICD-10-CM

## 2022-01-01 DIAGNOSIS — C18.9 MALIGNANT NEOPLASM OF COLON, UNSPECIFIED: ICD-10-CM

## 2022-01-01 DIAGNOSIS — M77.9 ENTHESOPATHY, UNSPECIFIED: Chronic | ICD-10-CM

## 2022-01-01 DIAGNOSIS — I46.9 CARDIAC ARREST, CAUSE UNSPECIFIED: ICD-10-CM

## 2022-01-01 DIAGNOSIS — Z71.89 OTHER SPECIFIED COUNSELING: ICD-10-CM

## 2022-01-01 DIAGNOSIS — R41.82 ALTERED MENTAL STATUS, UNSPECIFIED: ICD-10-CM

## 2022-01-01 DIAGNOSIS — G93.9 DISORDER OF BRAIN, UNSPECIFIED: ICD-10-CM

## 2022-01-01 LAB
ALBUMIN SERPL ELPH-MCNC: 2.9 G/DL — LOW (ref 3.3–5)
ALBUMIN SERPL ELPH-MCNC: 3.4 G/DL — SIGNIFICANT CHANGE UP (ref 3.3–5)
ALP SERPL-CCNC: 342 U/L — HIGH (ref 40–120)
ALP SERPL-CCNC: 343 U/L — HIGH (ref 40–120)
ALT FLD-CCNC: 30 U/L — SIGNIFICANT CHANGE UP (ref 10–45)
ALT FLD-CCNC: 37 U/L — SIGNIFICANT CHANGE UP (ref 10–45)
ANION GAP SERPL CALC-SCNC: 11 MMOL/L — SIGNIFICANT CHANGE UP (ref 5–17)
ANION GAP SERPL CALC-SCNC: 11 MMOL/L — SIGNIFICANT CHANGE UP (ref 5–17)
ANION GAP SERPL CALC-SCNC: 13 MMOL/L — SIGNIFICANT CHANGE UP (ref 5–17)
ANION GAP SERPL CALC-SCNC: 14 MMOL/L — SIGNIFICANT CHANGE UP (ref 5–17)
ANION GAP SERPL CALC-SCNC: 14 MMOL/L — SIGNIFICANT CHANGE UP (ref 5–17)
ANION GAP SERPL CALC-SCNC: 15 MMOL/L — SIGNIFICANT CHANGE UP (ref 5–17)
ANION GAP SERPL CALC-SCNC: 18 MMOL/L — HIGH (ref 5–17)
ANION GAP SERPL CALC-SCNC: 24 MMOL/L — HIGH (ref 5–17)
ANION GAP SERPL CALC-SCNC: 26 MMOL/L — HIGH (ref 5–17)
APPEARANCE UR: CLEAR — SIGNIFICANT CHANGE UP
APPEARANCE UR: CLEAR — SIGNIFICANT CHANGE UP
APTT BLD: 25.9 SEC — LOW (ref 27.5–35.5)
APTT BLD: 26.3 SEC — LOW (ref 27.5–35.5)
AST SERPL-CCNC: 31 U/L — SIGNIFICANT CHANGE UP (ref 10–40)
AST SERPL-CCNC: 37 U/L — SIGNIFICANT CHANGE UP (ref 10–40)
BACTERIA # UR AUTO: NEGATIVE — SIGNIFICANT CHANGE UP
BACTERIA # UR AUTO: NEGATIVE — SIGNIFICANT CHANGE UP
BASE EXCESS BLDV CALC-SCNC: 3.3 MMOL/L — HIGH (ref -2–3)
BASOPHILS # BLD AUTO: 0.07 K/UL — SIGNIFICANT CHANGE UP (ref 0–0.2)
BASOPHILS NFR BLD AUTO: 0.5 % — SIGNIFICANT CHANGE UP (ref 0–2)
BILIRUB SERPL-MCNC: 0.7 MG/DL — SIGNIFICANT CHANGE UP (ref 0.2–1.2)
BILIRUB SERPL-MCNC: 0.9 MG/DL — SIGNIFICANT CHANGE UP (ref 0.2–1.2)
BILIRUB UR-MCNC: NEGATIVE — SIGNIFICANT CHANGE UP
BILIRUB UR-MCNC: NEGATIVE — SIGNIFICANT CHANGE UP
BLD GP AB SCN SERPL QL: NEGATIVE — SIGNIFICANT CHANGE UP
BLOOD GAS VENOUS - CREATININE: SIGNIFICANT CHANGE UP MG/DL (ref 0.5–1.3)
BUN SERPL-MCNC: 18 MG/DL — SIGNIFICANT CHANGE UP (ref 7–23)
BUN SERPL-MCNC: 20 MG/DL — SIGNIFICANT CHANGE UP (ref 7–23)
BUN SERPL-MCNC: 28 MG/DL — HIGH (ref 7–23)
BUN SERPL-MCNC: 36 MG/DL — HIGH (ref 7–23)
BUN SERPL-MCNC: 40 MG/DL — HIGH (ref 7–23)
BUN SERPL-MCNC: 50 MG/DL — HIGH (ref 7–23)
BUN SERPL-MCNC: 57 MG/DL — HIGH (ref 7–23)
BUN SERPL-MCNC: 60 MG/DL — HIGH (ref 7–23)
BUN SERPL-MCNC: 62 MG/DL — HIGH (ref 7–23)
CA-I SERPL-SCNC: 1.32 MMOL/L — SIGNIFICANT CHANGE UP (ref 1.15–1.33)
CALCIUM SERPL-MCNC: 10.1 MG/DL — SIGNIFICANT CHANGE UP (ref 8.4–10.5)
CALCIUM SERPL-MCNC: 10.2 MG/DL — SIGNIFICANT CHANGE UP (ref 8.4–10.5)
CALCIUM SERPL-MCNC: 10.2 MG/DL — SIGNIFICANT CHANGE UP (ref 8.4–10.5)
CALCIUM SERPL-MCNC: 10.5 MG/DL — SIGNIFICANT CHANGE UP (ref 8.4–10.5)
CALCIUM SERPL-MCNC: 10.8 MG/DL — HIGH (ref 8.4–10.5)
CALCIUM SERPL-MCNC: 11.3 MG/DL — HIGH (ref 8.4–10.5)
CALCIUM SERPL-MCNC: 11.3 MG/DL — HIGH (ref 8.4–10.5)
CALCIUM SERPL-MCNC: 9.6 MG/DL — SIGNIFICANT CHANGE UP (ref 8.4–10.5)
CALCIUM SERPL-MCNC: 9.8 MG/DL — SIGNIFICANT CHANGE UP (ref 8.4–10.5)
CHLORIDE BLDV-SCNC: 93 MMOL/L — LOW (ref 96–108)
CHLORIDE SERPL-SCNC: 88 MMOL/L — LOW (ref 96–108)
CHLORIDE SERPL-SCNC: 90 MMOL/L — LOW (ref 96–108)
CHLORIDE SERPL-SCNC: 91 MMOL/L — LOW (ref 96–108)
CHLORIDE SERPL-SCNC: 92 MMOL/L — LOW (ref 96–108)
CHLORIDE SERPL-SCNC: 93 MMOL/L — LOW (ref 96–108)
CHLORIDE SERPL-SCNC: 95 MMOL/L — LOW (ref 96–108)
CHLORIDE SERPL-SCNC: 95 MMOL/L — LOW (ref 96–108)
CO2 BLDV-SCNC: 32 MMOL/L — HIGH (ref 22–26)
CO2 SERPL-SCNC: 14 MMOL/L — LOW (ref 22–31)
CO2 SERPL-SCNC: 15 MMOL/L — LOW (ref 22–31)
CO2 SERPL-SCNC: 25 MMOL/L — SIGNIFICANT CHANGE UP (ref 22–31)
CO2 SERPL-SCNC: 26 MMOL/L — SIGNIFICANT CHANGE UP (ref 22–31)
CO2 SERPL-SCNC: 26 MMOL/L — SIGNIFICANT CHANGE UP (ref 22–31)
CO2 SERPL-SCNC: 27 MMOL/L — SIGNIFICANT CHANGE UP (ref 22–31)
CO2 SERPL-SCNC: 29 MMOL/L — SIGNIFICANT CHANGE UP (ref 22–31)
COLOR SPEC: YELLOW — SIGNIFICANT CHANGE UP
COLOR SPEC: YELLOW — SIGNIFICANT CHANGE UP
CREAT SERPL-MCNC: 0.42 MG/DL — LOW (ref 0.5–1.3)
CREAT SERPL-MCNC: 0.48 MG/DL — LOW (ref 0.5–1.3)
CREAT SERPL-MCNC: 0.52 MG/DL — SIGNIFICANT CHANGE UP (ref 0.5–1.3)
CREAT SERPL-MCNC: 0.59 MG/DL — SIGNIFICANT CHANGE UP (ref 0.5–1.3)
CREAT SERPL-MCNC: 0.59 MG/DL — SIGNIFICANT CHANGE UP (ref 0.5–1.3)
CREAT SERPL-MCNC: 0.62 MG/DL — SIGNIFICANT CHANGE UP (ref 0.5–1.3)
CREAT SERPL-MCNC: 0.9 MG/DL — SIGNIFICANT CHANGE UP (ref 0.5–1.3)
CREAT SERPL-MCNC: 0.99 MG/DL — SIGNIFICANT CHANGE UP (ref 0.5–1.3)
CREAT SERPL-MCNC: 1.02 MG/DL — SIGNIFICANT CHANGE UP (ref 0.5–1.3)
CULTURE RESULTS: SIGNIFICANT CHANGE UP
DIFF PNL FLD: ABNORMAL
DIFF PNL FLD: ABNORMAL
EGFR: 103 ML/MIN/1.73M2 — SIGNIFICANT CHANGE UP
EGFR: 58 ML/MIN/1.73M2 — LOW
EGFR: 60 ML/MIN/1.73M2 — SIGNIFICANT CHANGE UP
EGFR: 67 ML/MIN/1.73M2 — SIGNIFICANT CHANGE UP
EGFR: 93 ML/MIN/1.73M2 — SIGNIFICANT CHANGE UP
EGFR: 95 ML/MIN/1.73M2 — SIGNIFICANT CHANGE UP
EGFR: 95 ML/MIN/1.73M2 — SIGNIFICANT CHANGE UP
EGFR: 97 ML/MIN/1.73M2 — SIGNIFICANT CHANGE UP
EGFR: 99 ML/MIN/1.73M2 — SIGNIFICANT CHANGE UP
EOSINOPHIL # BLD AUTO: 0.11 K/UL — SIGNIFICANT CHANGE UP (ref 0–0.5)
EOSINOPHIL NFR BLD AUTO: 0.7 % — SIGNIFICANT CHANGE UP (ref 0–6)
EPI CELLS # UR: 1 /HPF — SIGNIFICANT CHANGE UP
EPI CELLS # UR: 8 — SIGNIFICANT CHANGE UP
GAS PNL BLDA: SIGNIFICANT CHANGE UP
GAS PNL BLDA: SIGNIFICANT CHANGE UP
GAS PNL BLDV: 132 MMOL/L — LOW (ref 136–145)
GAS PNL BLDV: SIGNIFICANT CHANGE UP
GAS PNL BLDV: SIGNIFICANT CHANGE UP
GLUCOSE BLDC GLUCOMTR-MCNC: 188 MG/DL — HIGH (ref 70–99)
GLUCOSE BLDC GLUCOMTR-MCNC: 253 MG/DL — HIGH (ref 70–99)
GLUCOSE BLDV-MCNC: 130 MG/DL — HIGH (ref 70–99)
GLUCOSE SERPL-MCNC: 121 MG/DL — HIGH (ref 70–99)
GLUCOSE SERPL-MCNC: 123 MG/DL — HIGH (ref 70–99)
GLUCOSE SERPL-MCNC: 127 MG/DL — HIGH (ref 70–99)
GLUCOSE SERPL-MCNC: 127 MG/DL — HIGH (ref 70–99)
GLUCOSE SERPL-MCNC: 142 MG/DL — HIGH (ref 70–99)
GLUCOSE SERPL-MCNC: 165 MG/DL — HIGH (ref 70–99)
GLUCOSE SERPL-MCNC: 178 MG/DL — HIGH (ref 70–99)
GLUCOSE SERPL-MCNC: 212 MG/DL — HIGH (ref 70–99)
GLUCOSE SERPL-MCNC: 270 MG/DL — HIGH (ref 70–99)
GLUCOSE UR QL: NEGATIVE — SIGNIFICANT CHANGE UP
GLUCOSE UR QL: NEGATIVE — SIGNIFICANT CHANGE UP
GRAN CASTS # UR COMP ASSIST: 1 /LPF — SIGNIFICANT CHANGE UP
HCO3 BLDV-SCNC: 31 MMOL/L — HIGH (ref 22–29)
HCT VFR BLD CALC: 34.7 % — SIGNIFICANT CHANGE UP (ref 34.5–45)
HCT VFR BLD CALC: 36.6 % — SIGNIFICANT CHANGE UP (ref 34.5–45)
HCT VFR BLD CALC: 38.8 % — SIGNIFICANT CHANGE UP (ref 34.5–45)
HCT VFR BLD CALC: 38.9 % — SIGNIFICANT CHANGE UP (ref 34.5–45)
HCT VFR BLD CALC: 38.9 % — SIGNIFICANT CHANGE UP (ref 34.5–45)
HCT VFR BLD CALC: 39.3 % — SIGNIFICANT CHANGE UP (ref 34.5–45)
HCT VFR BLD CALC: 40 % — SIGNIFICANT CHANGE UP (ref 34.5–45)
HCT VFR BLD CALC: 40.6 % — SIGNIFICANT CHANGE UP (ref 34.5–45)
HCT VFR BLD CALC: 41.3 % — SIGNIFICANT CHANGE UP (ref 34.5–45)
HCT VFR BLD CALC: 41.5 % — SIGNIFICANT CHANGE UP (ref 34.5–45)
HCT VFR BLD CALC: 42.7 % — SIGNIFICANT CHANGE UP (ref 34.5–45)
HCT VFR BLD CALC: 47.6 % — HIGH (ref 34.5–45)
HCT VFR BLDA CALC: 37 % — SIGNIFICANT CHANGE UP (ref 34.5–46.5)
HCV AB S/CO SERPL IA: 0.11 S/CO — SIGNIFICANT CHANGE UP (ref 0–0.99)
HCV AB SERPL-IMP: SIGNIFICANT CHANGE UP
HGB BLD CALC-MCNC: 12.4 G/DL — SIGNIFICANT CHANGE UP (ref 11.7–16.1)
HGB BLD-MCNC: 10.7 G/DL — LOW (ref 11.5–15.5)
HGB BLD-MCNC: 11.5 G/DL — SIGNIFICANT CHANGE UP (ref 11.5–15.5)
HGB BLD-MCNC: 12 G/DL — SIGNIFICANT CHANGE UP (ref 11.5–15.5)
HGB BLD-MCNC: 12.1 G/DL — SIGNIFICANT CHANGE UP (ref 11.5–15.5)
HGB BLD-MCNC: 12.1 G/DL — SIGNIFICANT CHANGE UP (ref 11.5–15.5)
HGB BLD-MCNC: 12.3 G/DL — SIGNIFICANT CHANGE UP (ref 11.5–15.5)
HGB BLD-MCNC: 12.3 G/DL — SIGNIFICANT CHANGE UP (ref 11.5–15.5)
HGB BLD-MCNC: 12.8 G/DL — SIGNIFICANT CHANGE UP (ref 11.5–15.5)
HGB BLD-MCNC: 13.1 G/DL — SIGNIFICANT CHANGE UP (ref 11.5–15.5)
HGB BLD-MCNC: 13.3 G/DL — SIGNIFICANT CHANGE UP (ref 11.5–15.5)
HGB BLD-MCNC: 13.4 G/DL — SIGNIFICANT CHANGE UP (ref 11.5–15.5)
HGB BLD-MCNC: 14.4 G/DL — SIGNIFICANT CHANGE UP (ref 11.5–15.5)
HYALINE CASTS # UR AUTO: 1 /LPF — SIGNIFICANT CHANGE UP (ref 0–2)
HYALINE CASTS # UR AUTO: 5 /LPF — SIGNIFICANT CHANGE UP (ref 0–7)
IMM GRANULOCYTES NFR BLD AUTO: 0.6 % — SIGNIFICANT CHANGE UP (ref 0–1.5)
INR BLD: 1.2 RATIO — HIGH (ref 0.88–1.16)
INR BLD: 1.22 RATIO — HIGH (ref 0.88–1.16)
KETONES UR-MCNC: NEGATIVE — SIGNIFICANT CHANGE UP
KETONES UR-MCNC: NEGATIVE — SIGNIFICANT CHANGE UP
LACTATE BLDV-MCNC: 3.1 MMOL/L — HIGH (ref 0.5–2)
LACTATE SERPL-SCNC: 1.3 MMOL/L — SIGNIFICANT CHANGE UP (ref 0.5–2)
LACTATE SERPL-SCNC: 12.6 MMOL/L — CRITICAL HIGH (ref 0.5–2)
LEUKOCYTE ESTERASE UR-ACNC: NEGATIVE — SIGNIFICANT CHANGE UP
LEUKOCYTE ESTERASE UR-ACNC: NEGATIVE — SIGNIFICANT CHANGE UP
LYMPHOCYTES # BLD AUTO: 0.71 K/UL — LOW (ref 1–3.3)
LYMPHOCYTES # BLD AUTO: 4.8 % — LOW (ref 13–44)
MAGNESIUM SERPL-MCNC: 2 MG/DL — SIGNIFICANT CHANGE UP (ref 1.6–2.6)
MAGNESIUM SERPL-MCNC: 2.2 MG/DL — SIGNIFICANT CHANGE UP (ref 1.6–2.6)
MAGNESIUM SERPL-MCNC: 2.2 MG/DL — SIGNIFICANT CHANGE UP (ref 1.6–2.6)
MCHC RBC-ENTMCNC: 27.1 PG — SIGNIFICANT CHANGE UP (ref 27–34)
MCHC RBC-ENTMCNC: 27.2 PG — SIGNIFICANT CHANGE UP (ref 27–34)
MCHC RBC-ENTMCNC: 27.4 PG — SIGNIFICANT CHANGE UP (ref 27–34)
MCHC RBC-ENTMCNC: 27.4 PG — SIGNIFICANT CHANGE UP (ref 27–34)
MCHC RBC-ENTMCNC: 27.5 PG — SIGNIFICANT CHANGE UP (ref 27–34)
MCHC RBC-ENTMCNC: 27.5 PG — SIGNIFICANT CHANGE UP (ref 27–34)
MCHC RBC-ENTMCNC: 27.6 PG — SIGNIFICANT CHANGE UP (ref 27–34)
MCHC RBC-ENTMCNC: 27.7 PG — SIGNIFICANT CHANGE UP (ref 27–34)
MCHC RBC-ENTMCNC: 27.8 PG — SIGNIFICANT CHANGE UP (ref 27–34)
MCHC RBC-ENTMCNC: 28 PG — SIGNIFICANT CHANGE UP (ref 27–34)
MCHC RBC-ENTMCNC: 28.3 PG — SIGNIFICANT CHANGE UP (ref 27–34)
MCHC RBC-ENTMCNC: 28.3 PG — SIGNIFICANT CHANGE UP (ref 27–34)
MCHC RBC-ENTMCNC: 30.3 GM/DL — LOW (ref 32–36)
MCHC RBC-ENTMCNC: 30.3 GM/DL — LOW (ref 32–36)
MCHC RBC-ENTMCNC: 30.8 GM/DL — LOW (ref 32–36)
MCHC RBC-ENTMCNC: 30.8 GM/DL — LOW (ref 32–36)
MCHC RBC-ENTMCNC: 30.9 GM/DL — LOW (ref 32–36)
MCHC RBC-ENTMCNC: 31.1 GM/DL — LOW (ref 32–36)
MCHC RBC-ENTMCNC: 31.1 GM/DL — LOW (ref 32–36)
MCHC RBC-ENTMCNC: 31.4 GM/DL — LOW (ref 32–36)
MCHC RBC-ENTMCNC: 31.6 GM/DL — LOW (ref 32–36)
MCHC RBC-ENTMCNC: 31.6 GM/DL — LOW (ref 32–36)
MCHC RBC-ENTMCNC: 32 GM/DL — SIGNIFICANT CHANGE UP (ref 32–36)
MCHC RBC-ENTMCNC: 32.4 GM/DL — SIGNIFICANT CHANGE UP (ref 32–36)
MCV RBC AUTO: 87.1 FL — SIGNIFICANT CHANGE UP (ref 80–100)
MCV RBC AUTO: 87.2 FL — SIGNIFICANT CHANGE UP (ref 80–100)
MCV RBC AUTO: 88.2 FL — SIGNIFICANT CHANGE UP (ref 80–100)
MCV RBC AUTO: 88.3 FL — SIGNIFICANT CHANGE UP (ref 80–100)
MCV RBC AUTO: 88.3 FL — SIGNIFICANT CHANGE UP (ref 80–100)
MCV RBC AUTO: 88.4 FL — SIGNIFICANT CHANGE UP (ref 80–100)
MCV RBC AUTO: 88.7 FL — SIGNIFICANT CHANGE UP (ref 80–100)
MCV RBC AUTO: 88.8 FL — SIGNIFICANT CHANGE UP (ref 80–100)
MCV RBC AUTO: 89 FL — SIGNIFICANT CHANGE UP (ref 80–100)
MCV RBC AUTO: 89 FL — SIGNIFICANT CHANGE UP (ref 80–100)
MCV RBC AUTO: 89.5 FL — SIGNIFICANT CHANGE UP (ref 80–100)
MCV RBC AUTO: 90.8 FL — SIGNIFICANT CHANGE UP (ref 80–100)
MONOCYTES # BLD AUTO: 0.81 K/UL — SIGNIFICANT CHANGE UP (ref 0–0.9)
MONOCYTES NFR BLD AUTO: 5.5 % — SIGNIFICANT CHANGE UP (ref 2–14)
NEUTROPHILS # BLD AUTO: 12.88 K/UL — HIGH (ref 1.8–7.4)
NEUTROPHILS NFR BLD AUTO: 87.9 % — HIGH (ref 43–77)
NITRITE UR-MCNC: NEGATIVE — SIGNIFICANT CHANGE UP
NITRITE UR-MCNC: NEGATIVE — SIGNIFICANT CHANGE UP
NRBC # BLD: 0 /100 WBCS — SIGNIFICANT CHANGE UP (ref 0–0)
NT-PROBNP SERPL-SCNC: 2287 PG/ML — HIGH (ref 0–300)
PCO2 BLDV: 58 MMHG — HIGH (ref 39–42)
PH BLDV: 7.33 — SIGNIFICANT CHANGE UP (ref 7.32–7.43)
PH UR: 5.5 — SIGNIFICANT CHANGE UP (ref 5–8)
PH UR: 6.5 — SIGNIFICANT CHANGE UP (ref 5–8)
PHOSPHATE SERPL-MCNC: 2.1 MG/DL — LOW (ref 2.5–4.5)
PHOSPHATE SERPL-MCNC: 2.4 MG/DL — LOW (ref 2.5–4.5)
PHOSPHATE SERPL-MCNC: 2.8 MG/DL — SIGNIFICANT CHANGE UP (ref 2.5–4.5)
PHOSPHATE SERPL-MCNC: 3 MG/DL — SIGNIFICANT CHANGE UP (ref 2.5–4.5)
PLATELET # BLD AUTO: 223 K/UL — SIGNIFICANT CHANGE UP (ref 150–400)
PLATELET # BLD AUTO: 286 K/UL — SIGNIFICANT CHANGE UP (ref 150–400)
PLATELET # BLD AUTO: 330 K/UL — SIGNIFICANT CHANGE UP (ref 150–400)
PLATELET # BLD AUTO: 356 K/UL — SIGNIFICANT CHANGE UP (ref 150–400)
PLATELET # BLD AUTO: 389 K/UL — SIGNIFICANT CHANGE UP (ref 150–400)
PLATELET # BLD AUTO: 422 K/UL — HIGH (ref 150–400)
PLATELET # BLD AUTO: 435 K/UL — HIGH (ref 150–400)
PLATELET # BLD AUTO: 453 K/UL — HIGH (ref 150–400)
PLATELET # BLD AUTO: 454 K/UL — HIGH (ref 150–400)
PLATELET # BLD AUTO: 466 K/UL — HIGH (ref 150–400)
PLATELET # BLD AUTO: 467 K/UL — HIGH (ref 150–400)
PLATELET # BLD AUTO: 504 K/UL — HIGH (ref 150–400)
PO2 BLDV: 23 MMHG — LOW (ref 25–45)
POTASSIUM BLDV-SCNC: 3.5 MMOL/L — SIGNIFICANT CHANGE UP (ref 3.5–5.1)
POTASSIUM SERPL-MCNC: 3.8 MMOL/L — SIGNIFICANT CHANGE UP (ref 3.5–5.3)
POTASSIUM SERPL-MCNC: 4 MMOL/L — SIGNIFICANT CHANGE UP (ref 3.5–5.3)
POTASSIUM SERPL-MCNC: 4.1 MMOL/L — SIGNIFICANT CHANGE UP (ref 3.5–5.3)
POTASSIUM SERPL-MCNC: 4.2 MMOL/L — SIGNIFICANT CHANGE UP (ref 3.5–5.3)
POTASSIUM SERPL-MCNC: 4.4 MMOL/L — SIGNIFICANT CHANGE UP (ref 3.5–5.3)
POTASSIUM SERPL-MCNC: 6 MMOL/L — HIGH (ref 3.5–5.3)
POTASSIUM SERPL-MCNC: 6.2 MMOL/L — CRITICAL HIGH (ref 3.5–5.3)
POTASSIUM SERPL-SCNC: 3.8 MMOL/L — SIGNIFICANT CHANGE UP (ref 3.5–5.3)
POTASSIUM SERPL-SCNC: 4 MMOL/L — SIGNIFICANT CHANGE UP (ref 3.5–5.3)
POTASSIUM SERPL-SCNC: 4.1 MMOL/L — SIGNIFICANT CHANGE UP (ref 3.5–5.3)
POTASSIUM SERPL-SCNC: 4.2 MMOL/L — SIGNIFICANT CHANGE UP (ref 3.5–5.3)
POTASSIUM SERPL-SCNC: 4.4 MMOL/L — SIGNIFICANT CHANGE UP (ref 3.5–5.3)
POTASSIUM SERPL-SCNC: 6 MMOL/L — HIGH (ref 3.5–5.3)
POTASSIUM SERPL-SCNC: 6.2 MMOL/L — CRITICAL HIGH (ref 3.5–5.3)
PROT SERPL-MCNC: 6.9 G/DL — SIGNIFICANT CHANGE UP (ref 6–8.3)
PROT SERPL-MCNC: 7.3 G/DL — SIGNIFICANT CHANGE UP (ref 6–8.3)
PROT UR-MCNC: ABNORMAL
PROT UR-MCNC: ABNORMAL
PROTHROM AB SERPL-ACNC: 14 SEC — HIGH (ref 10.5–13.4)
PROTHROM AB SERPL-ACNC: 14.2 SEC — HIGH (ref 10.5–13.4)
RAPID RVP RESULT: SIGNIFICANT CHANGE UP
RBC # BLD: 3.9 M/UL — SIGNIFICANT CHANGE UP (ref 3.8–5.2)
RBC # BLD: 4.14 M/UL — SIGNIFICANT CHANGE UP (ref 3.8–5.2)
RBC # BLD: 4.36 M/UL — SIGNIFICANT CHANGE UP (ref 3.8–5.2)
RBC # BLD: 4.41 M/UL — SIGNIFICANT CHANGE UP (ref 3.8–5.2)
RBC # BLD: 4.45 M/UL — SIGNIFICANT CHANGE UP (ref 3.8–5.2)
RBC # BLD: 4.46 M/UL — SIGNIFICANT CHANGE UP (ref 3.8–5.2)
RBC # BLD: 4.47 M/UL — SIGNIFICANT CHANGE UP (ref 3.8–5.2)
RBC # BLD: 4.53 M/UL — SIGNIFICANT CHANGE UP (ref 3.8–5.2)
RBC # BLD: 4.68 M/UL — SIGNIFICANT CHANGE UP (ref 3.8–5.2)
RBC # BLD: 4.74 M/UL — SIGNIFICANT CHANGE UP (ref 3.8–5.2)
RBC # BLD: 4.81 M/UL — SIGNIFICANT CHANGE UP (ref 3.8–5.2)
RBC # BLD: 5.32 M/UL — HIGH (ref 3.8–5.2)
RBC # FLD: 15.9 % — HIGH (ref 10.3–14.5)
RBC # FLD: 16 % — HIGH (ref 10.3–14.5)
RBC # FLD: 16 % — HIGH (ref 10.3–14.5)
RBC # FLD: 16.2 % — HIGH (ref 10.3–14.5)
RBC # FLD: 16.3 % — HIGH (ref 10.3–14.5)
RBC # FLD: 16.4 % — HIGH (ref 10.3–14.5)
RBC # FLD: 16.6 % — HIGH (ref 10.3–14.5)
RBC # FLD: 17.3 % — HIGH (ref 10.3–14.5)
RBC CASTS # UR COMP ASSIST: 10 /HPF — HIGH (ref 0–4)
RBC CASTS # UR COMP ASSIST: 27 /HPF — HIGH (ref 0–4)
RH IG SCN BLD-IMP: POSITIVE — SIGNIFICANT CHANGE UP
SAO2 % BLDV: 22.3 % — LOW (ref 67–88)
SARS-COV-2 RNA SPEC QL NAA+PROBE: SIGNIFICANT CHANGE UP
SODIUM SERPL-SCNC: 130 MMOL/L — LOW (ref 135–145)
SODIUM SERPL-SCNC: 131 MMOL/L — LOW (ref 135–145)
SODIUM SERPL-SCNC: 131 MMOL/L — LOW (ref 135–145)
SODIUM SERPL-SCNC: 132 MMOL/L — LOW (ref 135–145)
SODIUM SERPL-SCNC: 132 MMOL/L — LOW (ref 135–145)
SODIUM SERPL-SCNC: 133 MMOL/L — LOW (ref 135–145)
SODIUM SERPL-SCNC: 135 MMOL/L — SIGNIFICANT CHANGE UP (ref 135–145)
SP GR SPEC: 1.02 — SIGNIFICANT CHANGE UP (ref 1.01–1.02)
SP GR SPEC: >1.05 (ref 1.01–1.02)
SPECIMEN SOURCE: SIGNIFICANT CHANGE UP
TROPONIN T, HIGH SENSITIVITY RESULT: 15 NG/L — SIGNIFICANT CHANGE UP (ref 0–51)
UROBILINOGEN FLD QL: NEGATIVE — SIGNIFICANT CHANGE UP
UROBILINOGEN FLD QL: NEGATIVE — SIGNIFICANT CHANGE UP
WBC # BLD: 11.05 K/UL — HIGH (ref 3.8–10.5)
WBC # BLD: 13.34 K/UL — HIGH (ref 3.8–10.5)
WBC # BLD: 14.67 K/UL — HIGH (ref 3.8–10.5)
WBC # BLD: 16.04 K/UL — HIGH (ref 3.8–10.5)
WBC # BLD: 17.44 K/UL — HIGH (ref 3.8–10.5)
WBC # BLD: 18.44 K/UL — HIGH (ref 3.8–10.5)
WBC # BLD: 20.19 K/UL — HIGH (ref 3.8–10.5)
WBC # BLD: 20.97 K/UL — HIGH (ref 3.8–10.5)
WBC # BLD: 24.74 K/UL — HIGH (ref 3.8–10.5)
WBC # BLD: 25.48 K/UL — HIGH (ref 3.8–10.5)
WBC # BLD: 26.32 K/UL — HIGH (ref 3.8–10.5)
WBC # BLD: 27.96 K/UL — HIGH (ref 3.8–10.5)
WBC # FLD AUTO: 11.05 K/UL — HIGH (ref 3.8–10.5)
WBC # FLD AUTO: 13.34 K/UL — HIGH (ref 3.8–10.5)
WBC # FLD AUTO: 14.67 K/UL — HIGH (ref 3.8–10.5)
WBC # FLD AUTO: 16.04 K/UL — HIGH (ref 3.8–10.5)
WBC # FLD AUTO: 17.44 K/UL — HIGH (ref 3.8–10.5)
WBC # FLD AUTO: 18.44 K/UL — HIGH (ref 3.8–10.5)
WBC # FLD AUTO: 20.19 K/UL — HIGH (ref 3.8–10.5)
WBC # FLD AUTO: 20.97 K/UL — HIGH (ref 3.8–10.5)
WBC # FLD AUTO: 24.74 K/UL — HIGH (ref 3.8–10.5)
WBC # FLD AUTO: 25.48 K/UL — HIGH (ref 3.8–10.5)
WBC # FLD AUTO: 26.32 K/UL — HIGH (ref 3.8–10.5)
WBC # FLD AUTO: 27.96 K/UL — HIGH (ref 3.8–10.5)
WBC UR QL: 18 /HPF — HIGH (ref 0–5)
WBC UR QL: 3 /HPF — SIGNIFICANT CHANGE UP (ref 0–5)

## 2022-01-01 PROCEDURE — 72157 MRI CHEST SPINE W/O & W/DYE: CPT | Mod: 26

## 2022-01-01 PROCEDURE — 37191 INS ENDOVAS VENA CAVA FILTR: CPT

## 2022-01-01 PROCEDURE — 99223 1ST HOSP IP/OBS HIGH 75: CPT

## 2022-01-01 PROCEDURE — 80053 COMPREHEN METABOLIC PANEL: CPT

## 2022-01-01 PROCEDURE — 99497 ADVNCD CARE PLAN 30 MIN: CPT | Mod: 25

## 2022-01-01 PROCEDURE — 85025 COMPLETE CBC W/AUTO DIFF WBC: CPT

## 2022-01-01 PROCEDURE — 71045 X-RAY EXAM CHEST 1 VIEW: CPT | Mod: 26

## 2022-01-01 PROCEDURE — 99285 EMERGENCY DEPT VISIT HI MDM: CPT | Mod: FS

## 2022-01-01 PROCEDURE — 99233 SBSQ HOSP IP/OBS HIGH 50: CPT

## 2022-01-01 PROCEDURE — C1769: CPT

## 2022-01-01 PROCEDURE — 86850 RBC ANTIBODY SCREEN: CPT

## 2022-01-01 PROCEDURE — 99231 SBSQ HOSP IP/OBS SF/LOW 25: CPT

## 2022-01-01 PROCEDURE — 93970 EXTREMITY STUDY: CPT | Mod: 26

## 2022-01-01 PROCEDURE — 93010 ELECTROCARDIOGRAM REPORT: CPT

## 2022-01-01 PROCEDURE — 84132 ASSAY OF SERUM POTASSIUM: CPT

## 2022-01-01 PROCEDURE — C1894: CPT

## 2022-01-01 PROCEDURE — 87086 URINE CULTURE/COLONY COUNT: CPT

## 2022-01-01 PROCEDURE — 99232 SBSQ HOSP IP/OBS MODERATE 35: CPT

## 2022-01-01 PROCEDURE — 86900 BLOOD TYPING SEROLOGIC ABO: CPT

## 2022-01-01 PROCEDURE — 97116 GAIT TRAINING THERAPY: CPT

## 2022-01-01 PROCEDURE — 76937 US GUIDE VASCULAR ACCESS: CPT

## 2022-01-01 PROCEDURE — 85018 HEMOGLOBIN: CPT

## 2022-01-01 PROCEDURE — 87040 BLOOD CULTURE FOR BACTERIA: CPT

## 2022-01-01 PROCEDURE — 74177 CT ABD & PELVIS W/CONTRAST: CPT | Mod: MA

## 2022-01-01 PROCEDURE — 82435 ASSAY OF BLOOD CHLORIDE: CPT

## 2022-01-01 PROCEDURE — 93970 EXTREMITY STUDY: CPT

## 2022-01-01 PROCEDURE — 70553 MRI BRAIN STEM W/O & W/DYE: CPT | Mod: 26

## 2022-01-01 PROCEDURE — U0005: CPT

## 2022-01-01 PROCEDURE — 83605 ASSAY OF LACTIC ACID: CPT

## 2022-01-01 PROCEDURE — 84295 ASSAY OF SERUM SODIUM: CPT

## 2022-01-01 PROCEDURE — 84484 ASSAY OF TROPONIN QUANT: CPT

## 2022-01-01 PROCEDURE — 82962 GLUCOSE BLOOD TEST: CPT

## 2022-01-01 PROCEDURE — 85014 HEMATOCRIT: CPT

## 2022-01-01 PROCEDURE — 97530 THERAPEUTIC ACTIVITIES: CPT

## 2022-01-01 PROCEDURE — 99498 ADVNCD CARE PLAN ADDL 30 MIN: CPT | Mod: 25

## 2022-01-01 PROCEDURE — 85027 COMPLETE CBC AUTOMATED: CPT

## 2022-01-01 PROCEDURE — C1880: CPT

## 2022-01-01 PROCEDURE — 82947 ASSAY GLUCOSE BLOOD QUANT: CPT

## 2022-01-01 PROCEDURE — U0003: CPT

## 2022-01-01 PROCEDURE — 99285 EMERGENCY DEPT VISIT HI MDM: CPT | Mod: 25

## 2022-01-01 PROCEDURE — 92610 EVALUATE SWALLOWING FUNCTION: CPT

## 2022-01-01 PROCEDURE — 93308 TTE F-UP OR LMTD: CPT

## 2022-01-01 PROCEDURE — 97162 PT EVAL MOD COMPLEX 30 MIN: CPT

## 2022-01-01 PROCEDURE — 97110 THERAPEUTIC EXERCISES: CPT

## 2022-01-01 PROCEDURE — 72157 MRI CHEST SPINE W/O & W/DYE: CPT

## 2022-01-01 PROCEDURE — 83880 ASSAY OF NATRIURETIC PEPTIDE: CPT

## 2022-01-01 PROCEDURE — 86901 BLOOD TYPING SEROLOGIC RH(D): CPT

## 2022-01-01 PROCEDURE — 36415 COLL VENOUS BLD VENIPUNCTURE: CPT

## 2022-01-01 PROCEDURE — 86803 HEPATITIS C AB TEST: CPT

## 2022-01-01 PROCEDURE — 70450 CT HEAD/BRAIN W/O DYE: CPT | Mod: 26

## 2022-01-01 PROCEDURE — 74177 CT ABD & PELVIS W/CONTRAST: CPT | Mod: 26,MA

## 2022-01-01 PROCEDURE — 99233 SBSQ HOSP IP/OBS HIGH 50: CPT | Mod: 25

## 2022-01-01 PROCEDURE — 0225U NFCT DS DNA&RNA 21 SARSCOV2: CPT

## 2022-01-01 PROCEDURE — 80048 BASIC METABOLIC PNL TOTAL CA: CPT

## 2022-01-01 PROCEDURE — 85730 THROMBOPLASTIN TIME PARTIAL: CPT

## 2022-01-01 PROCEDURE — 82803 BLOOD GASES ANY COMBINATION: CPT

## 2022-01-01 PROCEDURE — 71275 CT ANGIOGRAPHY CHEST: CPT | Mod: MA

## 2022-01-01 PROCEDURE — 71045 X-RAY EXAM CHEST 1 VIEW: CPT

## 2022-01-01 PROCEDURE — 93308 TTE F-UP OR LMTD: CPT | Mod: 26

## 2022-01-01 PROCEDURE — 71275 CT ANGIOGRAPHY CHEST: CPT | Mod: 26,MA

## 2022-01-01 PROCEDURE — 70553 MRI BRAIN STEM W/O & W/DYE: CPT

## 2022-01-01 PROCEDURE — 70450 CT HEAD/BRAIN W/O DYE: CPT | Mod: 26,MA

## 2022-01-01 PROCEDURE — 82330 ASSAY OF CALCIUM: CPT

## 2022-01-01 PROCEDURE — 85610 PROTHROMBIN TIME: CPT

## 2022-01-01 PROCEDURE — 99497 ADVNCD CARE PLAN 30 MIN: CPT

## 2022-01-01 PROCEDURE — 81001 URINALYSIS AUTO W/SCOPE: CPT

## 2022-01-01 PROCEDURE — 76937 US GUIDE VASCULAR ACCESS: CPT | Mod: 26

## 2022-01-01 PROCEDURE — 70450 CT HEAD/BRAIN W/O DYE: CPT

## 2022-01-01 PROCEDURE — 84100 ASSAY OF PHOSPHORUS: CPT

## 2022-01-01 PROCEDURE — 83735 ASSAY OF MAGNESIUM: CPT

## 2022-01-01 PROCEDURE — 82565 ASSAY OF CREATININE: CPT

## 2022-01-01 RX ORDER — METOPROLOL TARTRATE 50 MG
25 TABLET ORAL DAILY
Refills: 0 | Status: DISCONTINUED | OUTPATIENT
Start: 2022-01-01 | End: 2022-01-01

## 2022-01-01 RX ORDER — OXYCODONE HYDROCHLORIDE 5 MG/1
20 TABLET ORAL EVERY 6 HOURS
Refills: 0 | Status: DISCONTINUED | OUTPATIENT
Start: 2022-01-01 | End: 2022-01-01

## 2022-01-01 RX ORDER — OXYCODONE HYDROCHLORIDE 5 MG/1
20 TABLET ORAL EVERY 4 HOURS
Refills: 0 | Status: DISCONTINUED | OUTPATIENT
Start: 2022-01-01 | End: 2022-01-01

## 2022-01-01 RX ORDER — MORPHINE SULFATE 50 MG/1
1 CAPSULE, EXTENDED RELEASE ORAL
Qty: 14 | Refills: 0
Start: 2022-01-01 | End: 2022-01-01

## 2022-01-01 RX ORDER — SODIUM CHLORIDE 9 MG/ML
500 INJECTION INTRAMUSCULAR; INTRAVENOUS; SUBCUTANEOUS ONCE
Refills: 0 | Status: DISCONTINUED | OUTPATIENT
Start: 2022-01-01 | End: 2022-01-01

## 2022-01-01 RX ORDER — SODIUM CHLORIDE 9 MG/ML
1000 INJECTION, SOLUTION INTRAVENOUS
Refills: 0 | Status: DISCONTINUED | OUTPATIENT
Start: 2022-01-01 | End: 2022-01-01

## 2022-01-01 RX ORDER — OXYCODONE HYDROCHLORIDE 5 MG/1
10 TABLET ORAL ONCE
Refills: 0 | Status: DISCONTINUED | OUTPATIENT
Start: 2022-01-01 | End: 2022-01-01

## 2022-01-01 RX ORDER — OXYCODONE HYDROCHLORIDE 5 MG/1
1 TABLET ORAL
Qty: 0 | Refills: 0 | DISCHARGE
Start: 2022-01-01

## 2022-01-01 RX ORDER — MORPHINE SULFATE 50 MG/1
1 CAPSULE, EXTENDED RELEASE ORAL
Qty: 0 | Refills: 0 | DISCHARGE
Start: 2022-01-01

## 2022-01-01 RX ORDER — POTASSIUM PHOSPHATE, MONOBASIC POTASSIUM PHOSPHATE, DIBASIC 236; 224 MG/ML; MG/ML
15 INJECTION, SOLUTION INTRAVENOUS ONCE
Refills: 0 | Status: COMPLETED | OUTPATIENT
Start: 2022-01-01 | End: 2022-01-01

## 2022-01-01 RX ORDER — DEXAMETHASONE 0.5 MG/5ML
4 ELIXIR ORAL EVERY 6 HOURS
Refills: 0 | Status: DISCONTINUED | OUTPATIENT
Start: 2022-01-01 | End: 2022-01-01

## 2022-01-01 RX ORDER — PANTOPRAZOLE SODIUM 20 MG/1
40 TABLET, DELAYED RELEASE ORAL
Refills: 0 | Status: DISCONTINUED | OUTPATIENT
Start: 2022-01-01 | End: 2022-01-01

## 2022-01-01 RX ORDER — CALCIUM CARBONATE 500(1250)
0 TABLET ORAL
Qty: 0 | Refills: 0 | DISCHARGE

## 2022-01-01 RX ORDER — SODIUM POLYSTYRENE SULFONATE 4.1 MEQ/G
30 POWDER, FOR SUSPENSION ORAL ONCE
Refills: 0 | Status: DISCONTINUED | OUTPATIENT
Start: 2022-01-01 | End: 2022-01-01

## 2022-01-01 RX ORDER — OXYCODONE HYDROCHLORIDE 5 MG/1
15 TABLET ORAL EVERY 4 HOURS
Refills: 0 | Status: DISCONTINUED | OUTPATIENT
Start: 2022-01-01 | End: 2022-01-01

## 2022-01-01 RX ORDER — OXYCODONE HYDROCHLORIDE 5 MG/1
1 TABLET ORAL
Qty: 42 | Refills: 0
Start: 2022-01-01 | End: 2022-01-01

## 2022-01-01 RX ORDER — LEVETIRACETAM 250 MG/1
1 TABLET, FILM COATED ORAL
Qty: 60 | Refills: 0
Start: 2022-01-01 | End: 2022-10-20

## 2022-01-01 RX ORDER — LEVETIRACETAM 250 MG/1
500 TABLET, FILM COATED ORAL
Refills: 0 | Status: DISCONTINUED | OUTPATIENT
Start: 2022-01-01 | End: 2022-01-01

## 2022-01-01 RX ORDER — PIPERACILLIN AND TAZOBACTAM 4; .5 G/20ML; G/20ML
3.38 INJECTION, POWDER, LYOPHILIZED, FOR SOLUTION INTRAVENOUS ONCE
Refills: 0 | Status: COMPLETED | OUTPATIENT
Start: 2022-01-01 | End: 2022-01-01

## 2022-01-01 RX ORDER — FUROSEMIDE 40 MG
20 TABLET ORAL ONCE
Refills: 0 | Status: COMPLETED | OUTPATIENT
Start: 2022-01-01 | End: 2022-01-01

## 2022-01-01 RX ORDER — DEXAMETHASONE 0.5 MG/5ML
4 ELIXIR ORAL
Refills: 0 | Status: DISCONTINUED | OUTPATIENT
Start: 2022-01-01 | End: 2022-01-01

## 2022-01-01 RX ORDER — FLUTICASONE PROPIONATE AND SALMETEROL 50; 250 UG/1; UG/1
1 POWDER ORAL; RESPIRATORY (INHALATION)
Qty: 0 | Refills: 0 | DISCHARGE

## 2022-01-01 RX ORDER — LEVETIRACETAM 250 MG/1
500 TABLET, FILM COATED ORAL EVERY 12 HOURS
Refills: 0 | Status: DISCONTINUED | OUTPATIENT
Start: 2022-01-01 | End: 2022-01-01

## 2022-01-01 RX ORDER — OXYCODONE AND ACETAMINOPHEN 5; 325 MG/1; MG/1
1 TABLET ORAL EVERY 12 HOURS
Refills: 0 | Status: DISCONTINUED | OUTPATIENT
Start: 2022-01-01 | End: 2022-01-01

## 2022-01-01 RX ORDER — FUROSEMIDE 40 MG
1 TABLET ORAL
Qty: 30 | Refills: 0
Start: 2022-01-01 | End: 2022-10-20

## 2022-01-01 RX ORDER — DEXAMETHASONE 0.5 MG/5ML
1 ELIXIR ORAL
Qty: 0 | Refills: 0 | DISCHARGE
Start: 2022-01-01

## 2022-01-01 RX ORDER — INSULIN HUMAN 100 [IU]/ML
6 INJECTION, SOLUTION SUBCUTANEOUS ONCE
Refills: 0 | Status: COMPLETED | OUTPATIENT
Start: 2022-01-01 | End: 2022-01-01

## 2022-01-01 RX ORDER — OXYCODONE AND ACETAMINOPHEN 5; 325 MG/1; MG/1
1 TABLET ORAL EVERY 4 HOURS
Refills: 0 | Status: DISCONTINUED | OUTPATIENT
Start: 2022-01-01 | End: 2022-01-01

## 2022-01-01 RX ORDER — PANTOPRAZOLE SODIUM 20 MG/1
1 TABLET, DELAYED RELEASE ORAL
Qty: 30 | Refills: 0
Start: 2022-01-01 | End: 2022-10-20

## 2022-01-01 RX ORDER — DEXAMETHASONE 0.5 MG/5ML
1 ELIXIR ORAL
Qty: 56 | Refills: 0
Start: 2022-01-01 | End: 2022-10-04

## 2022-01-01 RX ORDER — PIPERACILLIN AND TAZOBACTAM 4; .5 G/20ML; G/20ML
3.38 INJECTION, POWDER, LYOPHILIZED, FOR SOLUTION INTRAVENOUS EVERY 12 HOURS
Refills: 0 | Status: DISCONTINUED | OUTPATIENT
Start: 2022-01-01 | End: 2022-01-01

## 2022-01-01 RX ORDER — METOPROLOL TARTRATE 50 MG
1 TABLET ORAL
Qty: 0 | Refills: 0 | DISCHARGE
Start: 2022-01-01

## 2022-01-01 RX ORDER — SENNA PLUS 8.6 MG/1
2 TABLET ORAL AT BEDTIME
Refills: 0 | Status: DISCONTINUED | OUTPATIENT
Start: 2022-01-01 | End: 2022-01-01

## 2022-01-01 RX ORDER — SENNA PLUS 8.6 MG/1
2 TABLET ORAL
Qty: 0 | Refills: 0 | DISCHARGE
Start: 2022-01-01

## 2022-01-01 RX ORDER — POLYETHYLENE GLYCOL 3350 17 G/17G
17 POWDER, FOR SOLUTION ORAL
Qty: 0 | Refills: 0 | DISCHARGE
Start: 2022-01-01

## 2022-01-01 RX ORDER — PREGABALIN 225 MG/1
0 CAPSULE ORAL
Qty: 0 | Refills: 0 | DISCHARGE

## 2022-01-01 RX ORDER — VANCOMYCIN HCL 1 G
1000 VIAL (EA) INTRAVENOUS EVERY 12 HOURS
Refills: 0 | Status: DISCONTINUED | OUTPATIENT
Start: 2022-01-01 | End: 2022-01-01

## 2022-01-01 RX ORDER — FUROSEMIDE 40 MG
1 TABLET ORAL
Qty: 0 | Refills: 0 | DISCHARGE
Start: 2022-01-01

## 2022-01-01 RX ORDER — ACETAMINOPHEN 500 MG
650 TABLET ORAL ONCE
Refills: 0 | Status: COMPLETED | OUTPATIENT
Start: 2022-01-01 | End: 2022-01-01

## 2022-01-01 RX ORDER — OXYCODONE HYDROCHLORIDE 5 MG/1
1 TABLET ORAL
Qty: 42 | Refills: 0
Start: 2022-01-01 | End: 2022-10-02

## 2022-01-01 RX ORDER — PANTOPRAZOLE SODIUM 20 MG/1
1 TABLET, DELAYED RELEASE ORAL
Qty: 0 | Refills: 0 | DISCHARGE
Start: 2022-01-01

## 2022-01-01 RX ORDER — FUROSEMIDE 40 MG
20 TABLET ORAL DAILY
Refills: 0 | Status: DISCONTINUED | OUTPATIENT
Start: 2022-01-01 | End: 2022-01-01

## 2022-01-01 RX ORDER — OXYCODONE HYDROCHLORIDE 5 MG/1
10 TABLET ORAL EVERY 4 HOURS
Refills: 0 | Status: DISCONTINUED | OUTPATIENT
Start: 2022-01-01 | End: 2022-01-01

## 2022-01-01 RX ORDER — HYDROMORPHONE HYDROCHLORIDE 2 MG/ML
0.2 INJECTION INTRAMUSCULAR; INTRAVENOUS; SUBCUTANEOUS
Refills: 0 | Status: DISCONTINUED | OUTPATIENT
Start: 2022-01-01 | End: 2022-01-01

## 2022-01-01 RX ORDER — METOPROLOL TARTRATE 50 MG
50 TABLET ORAL DAILY
Refills: 0 | Status: DISCONTINUED | OUTPATIENT
Start: 2022-01-01 | End: 2022-01-01

## 2022-01-01 RX ORDER — OXYCODONE AND ACETAMINOPHEN 5; 325 MG/1; MG/1
1 TABLET ORAL ONCE
Refills: 0 | Status: DISCONTINUED | OUTPATIENT
Start: 2022-01-01 | End: 2022-01-01

## 2022-01-01 RX ORDER — OXYCODONE HYDROCHLORIDE 5 MG/1
10 TABLET ORAL EVERY 6 HOURS
Refills: 0 | Status: DISCONTINUED | OUTPATIENT
Start: 2022-01-01 | End: 2022-01-01

## 2022-01-01 RX ORDER — OXYCODONE HYDROCHLORIDE 5 MG/1
15 TABLET ORAL EVERY 6 HOURS
Refills: 0 | Status: DISCONTINUED | OUTPATIENT
Start: 2022-01-01 | End: 2022-01-01

## 2022-01-01 RX ORDER — POLYETHYLENE GLYCOL 3350 17 G/17G
17 POWDER, FOR SOLUTION ORAL DAILY
Refills: 0 | Status: DISCONTINUED | OUTPATIENT
Start: 2022-01-01 | End: 2022-01-01

## 2022-01-01 RX ORDER — MULTIVIT-MIN/FERROUS GLUCONATE 9 MG/15 ML
1 LIQUID (ML) ORAL
Qty: 0 | Refills: 0 | DISCHARGE

## 2022-01-01 RX ORDER — METOPROLOL TARTRATE 50 MG
1 TABLET ORAL
Qty: 30 | Refills: 0
Start: 2022-01-01 | End: 2022-10-20

## 2022-01-01 RX ORDER — MELOXICAM 15 MG/1
1 TABLET ORAL
Qty: 0 | Refills: 0 | DISCHARGE

## 2022-01-01 RX ORDER — METOPROLOL TARTRATE 50 MG
75 TABLET ORAL DAILY
Refills: 0 | Status: DISCONTINUED | OUTPATIENT
Start: 2022-01-01 | End: 2022-01-01

## 2022-01-01 RX ORDER — OXYCODONE HYDROCHLORIDE 5 MG/1
1 TABLET ORAL
Qty: 0 | Refills: 0 | DISCHARGE

## 2022-01-01 RX ORDER — NALOXONE HYDROCHLORIDE 4 MG/.1ML
4 SPRAY NASAL
Qty: 1 | Refills: 0
Start: 2022-01-01

## 2022-01-01 RX ORDER — DEXTROSE 50 % IN WATER 50 %
50 SYRINGE (ML) INTRAVENOUS ONCE
Refills: 0 | Status: COMPLETED | OUTPATIENT
Start: 2022-01-01 | End: 2022-01-01

## 2022-01-01 RX ORDER — LEVETIRACETAM 250 MG/1
1 TABLET, FILM COATED ORAL
Qty: 0 | Refills: 0 | DISCHARGE
Start: 2022-01-01

## 2022-01-01 RX ORDER — HYDROMORPHONE HYDROCHLORIDE 2 MG/ML
0.2 INJECTION INTRAMUSCULAR; INTRAVENOUS; SUBCUTANEOUS ONCE
Refills: 0 | Status: DISCONTINUED | OUTPATIENT
Start: 2022-01-01 | End: 2022-01-01

## 2022-01-01 RX ORDER — CALCIUM GLUCONATE 100 MG/ML
1 VIAL (ML) INTRAVENOUS ONCE
Refills: 0 | Status: COMPLETED | OUTPATIENT
Start: 2022-01-01 | End: 2022-01-01

## 2022-01-01 RX ORDER — SODIUM POLYSTYRENE SULFONATE 4.1 MEQ/G
30 POWDER, FOR SUSPENSION ORAL ONCE
Refills: 0 | Status: COMPLETED | OUTPATIENT
Start: 2022-01-01 | End: 2022-01-01

## 2022-01-01 RX ORDER — METOPROLOL TARTRATE 50 MG
25 TABLET ORAL ONCE
Refills: 0 | Status: COMPLETED | OUTPATIENT
Start: 2022-01-01 | End: 2022-01-01

## 2022-01-01 RX ORDER — HYDROMORPHONE HYDROCHLORIDE 2 MG/ML
0.5 INJECTION INTRAMUSCULAR; INTRAVENOUS; SUBCUTANEOUS
Refills: 0 | Status: DISCONTINUED | OUTPATIENT
Start: 2022-01-01 | End: 2022-01-01

## 2022-01-01 RX ORDER — MORPHINE SULFATE 50 MG/1
15 CAPSULE, EXTENDED RELEASE ORAL EVERY 12 HOURS
Refills: 0 | Status: DISCONTINUED | OUTPATIENT
Start: 2022-01-01 | End: 2022-01-01

## 2022-01-01 RX ORDER — TIZANIDINE 4 MG/1
1 TABLET ORAL
Qty: 0 | Refills: 0 | DISCHARGE

## 2022-01-01 RX ADMIN — HYDROMORPHONE HYDROCHLORIDE 0.5 MILLIGRAM(S): 2 INJECTION INTRAMUSCULAR; INTRAVENOUS; SUBCUTANEOUS at 12:49

## 2022-01-01 RX ADMIN — OXYCODONE HYDROCHLORIDE 10 MILLIGRAM(S): 5 TABLET ORAL at 07:00

## 2022-01-01 RX ADMIN — Medication 4 MILLIGRAM(S): at 12:16

## 2022-01-01 RX ADMIN — OXYCODONE AND ACETAMINOPHEN 1 TABLET(S): 5; 325 TABLET ORAL at 22:39

## 2022-01-01 RX ADMIN — Medication 25 MILLIGRAM(S): at 05:22

## 2022-01-01 RX ADMIN — Medication 4 MILLIGRAM(S): at 11:17

## 2022-01-01 RX ADMIN — OXYCODONE HYDROCHLORIDE 15 MILLIGRAM(S): 5 TABLET ORAL at 13:03

## 2022-01-01 RX ADMIN — Medication 4 MILLIGRAM(S): at 11:56

## 2022-01-01 RX ADMIN — Medication 4 MILLIGRAM(S): at 05:44

## 2022-01-01 RX ADMIN — OXYCODONE HYDROCHLORIDE 15 MILLIGRAM(S): 5 TABLET ORAL at 08:50

## 2022-01-01 RX ADMIN — Medication 25 MILLIGRAM(S): at 06:02

## 2022-01-01 RX ADMIN — MORPHINE SULFATE 15 MILLIGRAM(S): 50 CAPSULE, EXTENDED RELEASE ORAL at 05:26

## 2022-01-01 RX ADMIN — LEVETIRACETAM 400 MILLIGRAM(S): 250 TABLET, FILM COATED ORAL at 17:21

## 2022-01-01 RX ADMIN — Medication 4 MILLIGRAM(S): at 17:03

## 2022-01-01 RX ADMIN — Medication 4 MILLIGRAM(S): at 05:51

## 2022-01-01 RX ADMIN — PANTOPRAZOLE SODIUM 40 MILLIGRAM(S): 20 TABLET, DELAYED RELEASE ORAL at 09:24

## 2022-01-01 RX ADMIN — LEVETIRACETAM 400 MILLIGRAM(S): 250 TABLET, FILM COATED ORAL at 17:03

## 2022-01-01 RX ADMIN — Medication 4 MILLIGRAM(S): at 21:43

## 2022-01-01 RX ADMIN — OXYCODONE HYDROCHLORIDE 15 MILLIGRAM(S): 5 TABLET ORAL at 08:00

## 2022-01-01 RX ADMIN — Medication 4 MILLIGRAM(S): at 02:18

## 2022-01-01 RX ADMIN — PANTOPRAZOLE SODIUM 40 MILLIGRAM(S): 20 TABLET, DELAYED RELEASE ORAL at 05:30

## 2022-01-01 RX ADMIN — OXYCODONE HYDROCHLORIDE 20 MILLIGRAM(S): 5 TABLET ORAL at 19:00

## 2022-01-01 RX ADMIN — Medication 4 MILLIGRAM(S): at 17:05

## 2022-01-01 RX ADMIN — Medication 4 MILLIGRAM(S): at 00:19

## 2022-01-01 RX ADMIN — OXYCODONE HYDROCHLORIDE 20 MILLIGRAM(S): 5 TABLET ORAL at 20:48

## 2022-01-01 RX ADMIN — OXYCODONE HYDROCHLORIDE 10 MILLIGRAM(S): 5 TABLET ORAL at 00:43

## 2022-01-01 RX ADMIN — Medication 20 MILLIGRAM(S): at 17:20

## 2022-01-01 RX ADMIN — OXYCODONE HYDROCHLORIDE 15 MILLIGRAM(S): 5 TABLET ORAL at 21:42

## 2022-01-01 RX ADMIN — MORPHINE SULFATE 15 MILLIGRAM(S): 50 CAPSULE, EXTENDED RELEASE ORAL at 18:05

## 2022-01-01 RX ADMIN — SENNA PLUS 2 TABLET(S): 8.6 TABLET ORAL at 21:53

## 2022-01-01 RX ADMIN — Medication 4 MILLIGRAM(S): at 23:17

## 2022-01-01 RX ADMIN — Medication 1 MILLIGRAM(S): at 11:32

## 2022-01-01 RX ADMIN — LEVETIRACETAM 400 MILLIGRAM(S): 250 TABLET, FILM COATED ORAL at 17:23

## 2022-01-01 RX ADMIN — OXYCODONE HYDROCHLORIDE 15 MILLIGRAM(S): 5 TABLET ORAL at 14:03

## 2022-01-01 RX ADMIN — LEVETIRACETAM 500 MILLIGRAM(S): 250 TABLET, FILM COATED ORAL at 18:34

## 2022-01-01 RX ADMIN — LEVETIRACETAM 500 MILLIGRAM(S): 250 TABLET, FILM COATED ORAL at 17:27

## 2022-01-01 RX ADMIN — Medication 4 MILLIGRAM(S): at 17:46

## 2022-01-01 RX ADMIN — SENNA PLUS 2 TABLET(S): 8.6 TABLET ORAL at 23:12

## 2022-01-01 RX ADMIN — LEVETIRACETAM 400 MILLIGRAM(S): 250 TABLET, FILM COATED ORAL at 05:23

## 2022-01-01 RX ADMIN — HYDROMORPHONE HYDROCHLORIDE 0.2 MILLIGRAM(S): 2 INJECTION INTRAMUSCULAR; INTRAVENOUS; SUBCUTANEOUS at 11:40

## 2022-01-01 RX ADMIN — SODIUM CHLORIDE 70 MILLILITER(S): 9 INJECTION, SOLUTION INTRAVENOUS at 21:43

## 2022-01-01 RX ADMIN — Medication 5 MILLIGRAM(S): at 21:17

## 2022-01-01 RX ADMIN — SODIUM CHLORIDE 80 MILLILITER(S): 9 INJECTION, SOLUTION INTRAVENOUS at 22:31

## 2022-01-01 RX ADMIN — Medication 4 MILLIGRAM(S): at 23:03

## 2022-01-01 RX ADMIN — OXYCODONE HYDROCHLORIDE 10 MILLIGRAM(S): 5 TABLET ORAL at 06:08

## 2022-01-01 RX ADMIN — Medication 4 MILLIGRAM(S): at 05:22

## 2022-01-01 RX ADMIN — OXYCODONE HYDROCHLORIDE 20 MILLIGRAM(S): 5 TABLET ORAL at 11:53

## 2022-01-01 RX ADMIN — Medication 4 MILLIGRAM(S): at 17:28

## 2022-01-01 RX ADMIN — HYDROMORPHONE HYDROCHLORIDE 0.2 MILLIGRAM(S): 2 INJECTION INTRAMUSCULAR; INTRAVENOUS; SUBCUTANEOUS at 04:14

## 2022-01-01 RX ADMIN — LEVETIRACETAM 500 MILLIGRAM(S): 250 TABLET, FILM COATED ORAL at 05:03

## 2022-01-01 RX ADMIN — SENNA PLUS 2 TABLET(S): 8.6 TABLET ORAL at 21:42

## 2022-01-01 RX ADMIN — OXYCODONE HYDROCHLORIDE 10 MILLIGRAM(S): 5 TABLET ORAL at 19:02

## 2022-01-01 RX ADMIN — OXYCODONE HYDROCHLORIDE 20 MILLIGRAM(S): 5 TABLET ORAL at 19:15

## 2022-01-01 RX ADMIN — Medication 4 MILLIGRAM(S): at 11:07

## 2022-01-01 RX ADMIN — OXYCODONE HYDROCHLORIDE 20 MILLIGRAM(S): 5 TABLET ORAL at 08:45

## 2022-01-01 RX ADMIN — OXYCODONE HYDROCHLORIDE 10 MILLIGRAM(S): 5 TABLET ORAL at 17:08

## 2022-01-01 RX ADMIN — Medication 4 MILLIGRAM(S): at 23:16

## 2022-01-01 RX ADMIN — LEVETIRACETAM 500 MILLIGRAM(S): 250 TABLET, FILM COATED ORAL at 05:27

## 2022-01-01 RX ADMIN — Medication 20 MILLIGRAM(S): at 06:02

## 2022-01-01 RX ADMIN — LEVETIRACETAM 500 MILLIGRAM(S): 250 TABLET, FILM COATED ORAL at 17:35

## 2022-01-01 RX ADMIN — PIPERACILLIN AND TAZOBACTAM 200 GRAM(S): 4; .5 INJECTION, POWDER, LYOPHILIZED, FOR SOLUTION INTRAVENOUS at 20:40

## 2022-01-01 RX ADMIN — LEVETIRACETAM 400 MILLIGRAM(S): 250 TABLET, FILM COATED ORAL at 17:16

## 2022-01-01 RX ADMIN — OXYCODONE HYDROCHLORIDE 20 MILLIGRAM(S): 5 TABLET ORAL at 09:44

## 2022-01-01 RX ADMIN — Medication 25 MILLIGRAM(S): at 05:52

## 2022-01-01 RX ADMIN — Medication 4 MILLIGRAM(S): at 05:20

## 2022-01-01 RX ADMIN — OXYCODONE HYDROCHLORIDE 15 MILLIGRAM(S): 5 TABLET ORAL at 09:36

## 2022-01-01 RX ADMIN — Medication 25 MILLIGRAM(S): at 05:20

## 2022-01-01 RX ADMIN — LEVETIRACETAM 500 MILLIGRAM(S): 250 TABLET, FILM COATED ORAL at 17:19

## 2022-01-01 RX ADMIN — OXYCODONE HYDROCHLORIDE 20 MILLIGRAM(S): 5 TABLET ORAL at 20:04

## 2022-01-01 RX ADMIN — OXYCODONE HYDROCHLORIDE 15 MILLIGRAM(S): 5 TABLET ORAL at 06:40

## 2022-01-01 RX ADMIN — OXYCODONE HYDROCHLORIDE 20 MILLIGRAM(S): 5 TABLET ORAL at 12:47

## 2022-01-01 RX ADMIN — PANTOPRAZOLE SODIUM 40 MILLIGRAM(S): 20 TABLET, DELAYED RELEASE ORAL at 06:02

## 2022-01-01 RX ADMIN — OXYCODONE AND ACETAMINOPHEN 1 TABLET(S): 5; 325 TABLET ORAL at 17:20

## 2022-01-01 RX ADMIN — Medication 4 MILLIGRAM(S): at 11:37

## 2022-01-01 RX ADMIN — POLYETHYLENE GLYCOL 3350 17 GRAM(S): 17 POWDER, FOR SOLUTION ORAL at 12:25

## 2022-01-01 RX ADMIN — OXYCODONE HYDROCHLORIDE 20 MILLIGRAM(S): 5 TABLET ORAL at 11:38

## 2022-01-01 RX ADMIN — Medication 4 MILLIGRAM(S): at 23:33

## 2022-01-01 RX ADMIN — MORPHINE SULFATE 15 MILLIGRAM(S): 50 CAPSULE, EXTENDED RELEASE ORAL at 06:39

## 2022-01-01 RX ADMIN — OXYCODONE HYDROCHLORIDE 15 MILLIGRAM(S): 5 TABLET ORAL at 12:32

## 2022-01-01 RX ADMIN — Medication 4 MILLIGRAM(S): at 23:05

## 2022-01-01 RX ADMIN — Medication 20 MILLIGRAM(S): at 05:23

## 2022-01-01 RX ADMIN — OXYCODONE HYDROCHLORIDE 20 MILLIGRAM(S): 5 TABLET ORAL at 11:47

## 2022-01-01 RX ADMIN — Medication 4 MILLIGRAM(S): at 18:40

## 2022-01-01 RX ADMIN — Medication 4 MILLIGRAM(S): at 05:52

## 2022-01-01 RX ADMIN — Medication 20 MILLIGRAM(S): at 05:38

## 2022-01-01 RX ADMIN — OXYCODONE HYDROCHLORIDE 20 MILLIGRAM(S): 5 TABLET ORAL at 20:45

## 2022-01-01 RX ADMIN — OXYCODONE HYDROCHLORIDE 15 MILLIGRAM(S): 5 TABLET ORAL at 15:29

## 2022-01-01 RX ADMIN — Medication 4 MILLIGRAM(S): at 23:02

## 2022-01-01 RX ADMIN — SENNA PLUS 2 TABLET(S): 8.6 TABLET ORAL at 22:27

## 2022-01-01 RX ADMIN — OXYCODONE HYDROCHLORIDE 10 MILLIGRAM(S): 5 TABLET ORAL at 22:00

## 2022-01-01 RX ADMIN — PANTOPRAZOLE SODIUM 40 MILLIGRAM(S): 20 TABLET, DELAYED RELEASE ORAL at 06:42

## 2022-01-01 RX ADMIN — OXYCODONE HYDROCHLORIDE 10 MILLIGRAM(S): 5 TABLET ORAL at 21:18

## 2022-01-01 RX ADMIN — HYDROMORPHONE HYDROCHLORIDE 0.5 MILLIGRAM(S): 2 INJECTION INTRAMUSCULAR; INTRAVENOUS; SUBCUTANEOUS at 13:20

## 2022-01-01 RX ADMIN — Medication 20 MILLIGRAM(S): at 05:27

## 2022-01-01 RX ADMIN — Medication 4 MILLIGRAM(S): at 11:33

## 2022-01-01 RX ADMIN — MORPHINE SULFATE 15 MILLIGRAM(S): 50 CAPSULE, EXTENDED RELEASE ORAL at 17:19

## 2022-01-01 RX ADMIN — Medication 4 MILLIGRAM(S): at 11:12

## 2022-01-01 RX ADMIN — OXYCODONE HYDROCHLORIDE 15 MILLIGRAM(S): 5 TABLET ORAL at 10:17

## 2022-01-01 RX ADMIN — LEVETIRACETAM 500 MILLIGRAM(S): 250 TABLET, FILM COATED ORAL at 17:17

## 2022-01-01 RX ADMIN — Medication 4 MILLIGRAM(S): at 05:38

## 2022-01-01 RX ADMIN — LEVETIRACETAM 500 MILLIGRAM(S): 250 TABLET, FILM COATED ORAL at 17:05

## 2022-01-01 RX ADMIN — OXYCODONE HYDROCHLORIDE 20 MILLIGRAM(S): 5 TABLET ORAL at 12:30

## 2022-01-01 RX ADMIN — Medication 5 MILLIGRAM(S): at 21:42

## 2022-01-01 RX ADMIN — OXYCODONE AND ACETAMINOPHEN 1 TABLET(S): 5; 325 TABLET ORAL at 06:34

## 2022-01-01 RX ADMIN — SODIUM CHLORIDE 50 MILLILITER(S): 9 INJECTION, SOLUTION INTRAVENOUS at 21:37

## 2022-01-01 RX ADMIN — Medication 4 MILLIGRAM(S): at 18:34

## 2022-01-01 RX ADMIN — SODIUM POLYSTYRENE SULFONATE 30 GRAM(S): 4.1 POWDER, FOR SUSPENSION ORAL at 22:54

## 2022-01-01 RX ADMIN — Medication 20 MILLIGRAM(S): at 05:44

## 2022-01-01 RX ADMIN — SODIUM CHLORIDE 70 MILLILITER(S): 9 INJECTION, SOLUTION INTRAVENOUS at 08:50

## 2022-01-01 RX ADMIN — Medication 4 MILLIGRAM(S): at 23:11

## 2022-01-01 RX ADMIN — Medication 4 MILLIGRAM(S): at 01:00

## 2022-01-01 RX ADMIN — OXYCODONE HYDROCHLORIDE 20 MILLIGRAM(S): 5 TABLET ORAL at 19:35

## 2022-01-01 RX ADMIN — OXYCODONE HYDROCHLORIDE 10 MILLIGRAM(S): 5 TABLET ORAL at 18:15

## 2022-01-01 RX ADMIN — SODIUM CHLORIDE 50 MILLILITER(S): 9 INJECTION, SOLUTION INTRAVENOUS at 22:40

## 2022-01-01 RX ADMIN — OXYCODONE HYDROCHLORIDE 15 MILLIGRAM(S): 5 TABLET ORAL at 09:46

## 2022-01-01 RX ADMIN — Medication 4 MILLIGRAM(S): at 05:09

## 2022-01-01 RX ADMIN — Medication 650 MILLIGRAM(S): at 19:13

## 2022-01-01 RX ADMIN — OXYCODONE HYDROCHLORIDE 10 MILLIGRAM(S): 5 TABLET ORAL at 07:30

## 2022-01-01 RX ADMIN — SENNA PLUS 2 TABLET(S): 8.6 TABLET ORAL at 22:39

## 2022-01-01 RX ADMIN — OXYCODONE HYDROCHLORIDE 15 MILLIGRAM(S): 5 TABLET ORAL at 07:00

## 2022-01-01 RX ADMIN — OXYCODONE HYDROCHLORIDE 15 MILLIGRAM(S): 5 TABLET ORAL at 13:14

## 2022-01-01 RX ADMIN — Medication 4 MILLIGRAM(S): at 17:21

## 2022-01-01 RX ADMIN — PANTOPRAZOLE SODIUM 40 MILLIGRAM(S): 20 TABLET, DELAYED RELEASE ORAL at 05:38

## 2022-01-01 RX ADMIN — Medication 4 MILLIGRAM(S): at 05:29

## 2022-01-01 RX ADMIN — Medication 20 MILLIGRAM(S): at 05:29

## 2022-01-01 RX ADMIN — OXYCODONE HYDROCHLORIDE 15 MILLIGRAM(S): 5 TABLET ORAL at 10:50

## 2022-01-01 RX ADMIN — OXYCODONE HYDROCHLORIDE 20 MILLIGRAM(S): 5 TABLET ORAL at 09:12

## 2022-01-01 RX ADMIN — OXYCODONE HYDROCHLORIDE 20 MILLIGRAM(S): 5 TABLET ORAL at 14:45

## 2022-01-01 RX ADMIN — OXYCODONE HYDROCHLORIDE 15 MILLIGRAM(S): 5 TABLET ORAL at 22:15

## 2022-01-01 RX ADMIN — OXYCODONE HYDROCHLORIDE 15 MILLIGRAM(S): 5 TABLET ORAL at 14:37

## 2022-01-01 RX ADMIN — Medication 4 MILLIGRAM(S): at 12:25

## 2022-01-01 RX ADMIN — LEVETIRACETAM 500 MILLIGRAM(S): 250 TABLET, FILM COATED ORAL at 05:52

## 2022-01-01 RX ADMIN — Medication 4 MILLIGRAM(S): at 13:40

## 2022-01-01 RX ADMIN — OXYCODONE HYDROCHLORIDE 20 MILLIGRAM(S): 5 TABLET ORAL at 16:31

## 2022-01-01 RX ADMIN — OXYCODONE HYDROCHLORIDE 15 MILLIGRAM(S): 5 TABLET ORAL at 22:33

## 2022-01-01 RX ADMIN — Medication 25 MILLIGRAM(S): at 06:42

## 2022-01-01 RX ADMIN — Medication 4 MILLIGRAM(S): at 11:51

## 2022-01-01 RX ADMIN — OXYCODONE HYDROCHLORIDE 20 MILLIGRAM(S): 5 TABLET ORAL at 09:14

## 2022-01-01 RX ADMIN — HYDROMORPHONE HYDROCHLORIDE 0.2 MILLIGRAM(S): 2 INJECTION INTRAMUSCULAR; INTRAVENOUS; SUBCUTANEOUS at 00:57

## 2022-01-01 RX ADMIN — OXYCODONE HYDROCHLORIDE 20 MILLIGRAM(S): 5 TABLET ORAL at 06:00

## 2022-01-01 RX ADMIN — OXYCODONE HYDROCHLORIDE 20 MILLIGRAM(S): 5 TABLET ORAL at 14:22

## 2022-01-01 RX ADMIN — LEVETIRACETAM 500 MILLIGRAM(S): 250 TABLET, FILM COATED ORAL at 20:08

## 2022-01-01 RX ADMIN — OXYCODONE HYDROCHLORIDE 10 MILLIGRAM(S): 5 TABLET ORAL at 02:00

## 2022-01-01 RX ADMIN — LEVETIRACETAM 400 MILLIGRAM(S): 250 TABLET, FILM COATED ORAL at 06:11

## 2022-01-01 RX ADMIN — Medication 650 MILLIGRAM(S): at 21:19

## 2022-01-01 RX ADMIN — Medication 4 MILLIGRAM(S): at 06:04

## 2022-01-01 RX ADMIN — LEVETIRACETAM 500 MILLIGRAM(S): 250 TABLET, FILM COATED ORAL at 17:46

## 2022-01-01 RX ADMIN — OXYCODONE HYDROCHLORIDE 10 MILLIGRAM(S): 5 TABLET ORAL at 08:26

## 2022-01-01 RX ADMIN — Medication 4 MILLIGRAM(S): at 06:42

## 2022-01-01 RX ADMIN — PIPERACILLIN AND TAZOBACTAM 25 GRAM(S): 4; .5 INJECTION, POWDER, LYOPHILIZED, FOR SOLUTION INTRAVENOUS at 06:20

## 2022-01-01 RX ADMIN — OXYCODONE HYDROCHLORIDE 20 MILLIGRAM(S): 5 TABLET ORAL at 17:18

## 2022-01-01 RX ADMIN — OXYCODONE HYDROCHLORIDE 20 MILLIGRAM(S): 5 TABLET ORAL at 21:47

## 2022-01-01 RX ADMIN — Medication 25 MILLIGRAM(S): at 05:29

## 2022-01-01 RX ADMIN — OXYCODONE HYDROCHLORIDE 10 MILLIGRAM(S): 5 TABLET ORAL at 06:03

## 2022-01-01 RX ADMIN — MORPHINE SULFATE 15 MILLIGRAM(S): 50 CAPSULE, EXTENDED RELEASE ORAL at 19:00

## 2022-01-01 RX ADMIN — MORPHINE SULFATE 15 MILLIGRAM(S): 50 CAPSULE, EXTENDED RELEASE ORAL at 05:38

## 2022-01-01 RX ADMIN — LEVETIRACETAM 500 MILLIGRAM(S): 250 TABLET, FILM COATED ORAL at 05:08

## 2022-01-01 RX ADMIN — PANTOPRAZOLE SODIUM 40 MILLIGRAM(S): 20 TABLET, DELAYED RELEASE ORAL at 05:27

## 2022-01-01 RX ADMIN — Medication 4 MILLIGRAM(S): at 20:08

## 2022-01-01 RX ADMIN — OXYCODONE AND ACETAMINOPHEN 1 TABLET(S): 5; 325 TABLET ORAL at 11:52

## 2022-01-01 RX ADMIN — HYDROMORPHONE HYDROCHLORIDE 0.2 MILLIGRAM(S): 2 INJECTION INTRAMUSCULAR; INTRAVENOUS; SUBCUTANEOUS at 10:19

## 2022-01-01 RX ADMIN — OXYCODONE HYDROCHLORIDE 10 MILLIGRAM(S): 5 TABLET ORAL at 00:30

## 2022-01-01 RX ADMIN — Medication 4 MILLIGRAM(S): at 17:17

## 2022-01-01 RX ADMIN — Medication 4 MILLIGRAM(S): at 17:24

## 2022-01-01 RX ADMIN — OXYCODONE HYDROCHLORIDE 10 MILLIGRAM(S): 5 TABLET ORAL at 13:25

## 2022-01-01 RX ADMIN — OXYCODONE HYDROCHLORIDE 15 MILLIGRAM(S): 5 TABLET ORAL at 13:21

## 2022-01-01 RX ADMIN — OXYCODONE HYDROCHLORIDE 20 MILLIGRAM(S): 5 TABLET ORAL at 11:02

## 2022-01-01 RX ADMIN — PANTOPRAZOLE SODIUM 40 MILLIGRAM(S): 20 TABLET, DELAYED RELEASE ORAL at 05:20

## 2022-01-01 RX ADMIN — Medication 4 MILLIGRAM(S): at 00:48

## 2022-01-01 RX ADMIN — Medication 1 MILLIGRAM(S): at 20:55

## 2022-01-01 RX ADMIN — OXYCODONE AND ACETAMINOPHEN 1 TABLET(S): 5; 325 TABLET ORAL at 06:53

## 2022-01-01 RX ADMIN — OXYCODONE HYDROCHLORIDE 10 MILLIGRAM(S): 5 TABLET ORAL at 01:30

## 2022-01-01 RX ADMIN — Medication 4 MILLIGRAM(S): at 17:35

## 2022-01-01 RX ADMIN — Medication 25 MILLIGRAM(S): at 18:20

## 2022-01-01 RX ADMIN — Medication 4 MILLIGRAM(S): at 11:03

## 2022-01-01 RX ADMIN — Medication 20 MILLIGRAM(S): at 20:40

## 2022-01-01 RX ADMIN — LEVETIRACETAM 500 MILLIGRAM(S): 250 TABLET, FILM COATED ORAL at 06:02

## 2022-01-01 RX ADMIN — Medication 25 MILLIGRAM(S): at 11:52

## 2022-01-01 RX ADMIN — POTASSIUM PHOSPHATE, MONOBASIC POTASSIUM PHOSPHATE, DIBASIC 62.5 MILLIMOLE(S): 236; 224 INJECTION, SOLUTION INTRAVENOUS at 11:16

## 2022-01-01 RX ADMIN — Medication 25 MILLIGRAM(S): at 05:44

## 2022-01-01 RX ADMIN — OXYCODONE HYDROCHLORIDE 15 MILLIGRAM(S): 5 TABLET ORAL at 06:02

## 2022-01-01 RX ADMIN — OXYCODONE HYDROCHLORIDE 10 MILLIGRAM(S): 5 TABLET ORAL at 22:31

## 2022-01-01 RX ADMIN — Medication 25 MILLIGRAM(S): at 05:38

## 2022-01-01 RX ADMIN — LEVETIRACETAM 500 MILLIGRAM(S): 250 TABLET, FILM COATED ORAL at 05:29

## 2022-01-01 RX ADMIN — Medication 4 MILLIGRAM(S): at 11:48

## 2022-01-01 RX ADMIN — OXYCODONE HYDROCHLORIDE 20 MILLIGRAM(S): 5 TABLET ORAL at 20:22

## 2022-01-01 RX ADMIN — LEVETIRACETAM 500 MILLIGRAM(S): 250 TABLET, FILM COATED ORAL at 17:20

## 2022-01-01 RX ADMIN — OXYCODONE HYDROCHLORIDE 20 MILLIGRAM(S): 5 TABLET ORAL at 06:17

## 2022-01-01 RX ADMIN — Medication 4 MILLIGRAM(S): at 17:19

## 2022-01-01 RX ADMIN — Medication 250 MILLIGRAM(S): at 20:40

## 2022-01-01 RX ADMIN — OXYCODONE HYDROCHLORIDE 10 MILLIGRAM(S): 5 TABLET ORAL at 01:45

## 2022-01-01 RX ADMIN — Medication 4 MILLIGRAM(S): at 05:08

## 2022-01-01 RX ADMIN — Medication 4 MILLIGRAM(S): at 11:52

## 2022-01-01 RX ADMIN — OXYCODONE HYDROCHLORIDE 10 MILLIGRAM(S): 5 TABLET ORAL at 17:27

## 2022-01-01 RX ADMIN — MORPHINE SULFATE 15 MILLIGRAM(S): 50 CAPSULE, EXTENDED RELEASE ORAL at 17:46

## 2022-01-01 RX ADMIN — OXYCODONE HYDROCHLORIDE 20 MILLIGRAM(S): 5 TABLET ORAL at 03:47

## 2022-01-01 RX ADMIN — OXYCODONE HYDROCHLORIDE 15 MILLIGRAM(S): 5 TABLET ORAL at 10:43

## 2022-01-01 RX ADMIN — Medication 4 MILLIGRAM(S): at 11:18

## 2022-01-01 RX ADMIN — Medication 20 MILLIGRAM(S): at 06:43

## 2022-01-01 RX ADMIN — OXYCODONE AND ACETAMINOPHEN 1 TABLET(S): 5; 325 TABLET ORAL at 18:01

## 2022-01-01 RX ADMIN — Medication 20 MILLIGRAM(S): at 05:08

## 2022-01-01 RX ADMIN — OXYCODONE HYDROCHLORIDE 20 MILLIGRAM(S): 5 TABLET ORAL at 04:30

## 2022-01-01 RX ADMIN — OXYCODONE HYDROCHLORIDE 10 MILLIGRAM(S): 5 TABLET ORAL at 23:05

## 2022-01-01 RX ADMIN — Medication 25 MILLIGRAM(S): at 06:04

## 2022-01-01 RX ADMIN — LEVETIRACETAM 500 MILLIGRAM(S): 250 TABLET, FILM COATED ORAL at 05:44

## 2022-01-01 RX ADMIN — Medication 4 MILLIGRAM(S): at 06:02

## 2022-01-01 RX ADMIN — LEVETIRACETAM 500 MILLIGRAM(S): 250 TABLET, FILM COATED ORAL at 06:43

## 2022-01-01 RX ADMIN — OXYCODONE HYDROCHLORIDE 10 MILLIGRAM(S): 5 TABLET ORAL at 01:34

## 2022-01-01 RX ADMIN — Medication 50 MILLIGRAM(S): at 05:03

## 2022-01-01 RX ADMIN — POTASSIUM PHOSPHATE, MONOBASIC POTASSIUM PHOSPHATE, DIBASIC 62.5 MILLIMOLE(S): 236; 224 INJECTION, SOLUTION INTRAVENOUS at 11:55

## 2022-01-01 RX ADMIN — LEVETIRACETAM 500 MILLIGRAM(S): 250 TABLET, FILM COATED ORAL at 05:20

## 2022-01-01 RX ADMIN — Medication 4 MILLIGRAM(S): at 17:22

## 2022-01-01 RX ADMIN — PIPERACILLIN AND TAZOBACTAM 25 GRAM(S): 4; .5 INJECTION, POWDER, LYOPHILIZED, FOR SOLUTION INTRAVENOUS at 01:08

## 2022-01-01 RX ADMIN — SENNA PLUS 2 TABLET(S): 8.6 TABLET ORAL at 23:33

## 2022-01-01 RX ADMIN — PANTOPRAZOLE SODIUM 40 MILLIGRAM(S): 20 TABLET, DELAYED RELEASE ORAL at 05:46

## 2022-01-01 RX ADMIN — OXYCODONE HYDROCHLORIDE 15 MILLIGRAM(S): 5 TABLET ORAL at 14:00

## 2022-01-01 RX ADMIN — HYDROMORPHONE HYDROCHLORIDE 0.2 MILLIGRAM(S): 2 INJECTION INTRAMUSCULAR; INTRAVENOUS; SUBCUTANEOUS at 01:51

## 2022-01-01 RX ADMIN — MORPHINE SULFATE 15 MILLIGRAM(S): 50 CAPSULE, EXTENDED RELEASE ORAL at 18:04

## 2022-01-01 RX ADMIN — OXYCODONE HYDROCHLORIDE 20 MILLIGRAM(S): 5 TABLET ORAL at 08:33

## 2022-01-01 RX ADMIN — OXYCODONE HYDROCHLORIDE 20 MILLIGRAM(S): 5 TABLET ORAL at 20:00

## 2022-01-01 RX ADMIN — OXYCODONE HYDROCHLORIDE 10 MILLIGRAM(S): 5 TABLET ORAL at 07:56

## 2022-01-01 RX ADMIN — OXYCODONE AND ACETAMINOPHEN 1 TABLET(S): 5; 325 TABLET ORAL at 12:39

## 2022-01-01 RX ADMIN — OXYCODONE HYDROCHLORIDE 15 MILLIGRAM(S): 5 TABLET ORAL at 16:42

## 2022-01-01 RX ADMIN — OXYCODONE AND ACETAMINOPHEN 1 TABLET(S): 5; 325 TABLET ORAL at 23:06

## 2022-01-01 RX ADMIN — Medication 4 MILLIGRAM(S): at 05:27

## 2022-01-01 RX ADMIN — SODIUM CHLORIDE 70 MILLILITER(S): 9 INJECTION, SOLUTION INTRAVENOUS at 06:02

## 2022-01-01 RX ADMIN — SENNA PLUS 2 TABLET(S): 8.6 TABLET ORAL at 21:17

## 2022-01-01 RX ADMIN — OXYCODONE HYDROCHLORIDE 15 MILLIGRAM(S): 5 TABLET ORAL at 21:53

## 2022-01-01 RX ADMIN — LEVETIRACETAM 400 MILLIGRAM(S): 250 TABLET, FILM COATED ORAL at 05:52

## 2022-01-01 RX ADMIN — SODIUM CHLORIDE 80 MILLILITER(S): 9 INJECTION, SOLUTION INTRAVENOUS at 12:49

## 2022-01-01 RX ADMIN — Medication 4 MILLIGRAM(S): at 11:09

## 2022-01-01 RX ADMIN — OXYCODONE HYDROCHLORIDE 20 MILLIGRAM(S): 5 TABLET ORAL at 09:03

## 2022-01-01 RX ADMIN — Medication 4 MILLIGRAM(S): at 05:03

## 2022-01-01 RX ADMIN — LEVETIRACETAM 500 MILLIGRAM(S): 250 TABLET, FILM COATED ORAL at 05:38

## 2022-01-01 RX ADMIN — MORPHINE SULFATE 15 MILLIGRAM(S): 50 CAPSULE, EXTENDED RELEASE ORAL at 17:34

## 2022-01-01 RX ADMIN — LEVETIRACETAM 400 MILLIGRAM(S): 250 TABLET, FILM COATED ORAL at 05:06

## 2022-09-13 NOTE — H&P ADULT - HISTORY OF PRESENT ILLNESS
75 yo F        h/o colon CA with  mets      was on   chemo ,  no longer on oral chemo x  past  1 mo     p/w AMS x4 weeks, worsening over the last few days.     had also been c/o right sided rib pain for weeks as well.sob  on exertion x days.     pmd  d r brand   sent pt in for further evaluation as pt presented to her with AMS, worsening weakness and lethargy.     Per , pt slipped and fell in the bathroom while getting up from the toilet 3 weeks ago.    Unsure if she had any LOC. Has had unsteady gait since.      +intermittent episodes of diarrhea     Denies nausea, vomiting, fever, chills, cough, chest pain, blood in stool, urinary complaints, headache.

## 2022-09-13 NOTE — ED PROVIDER NOTE - NSICDXPASTSURGICALHX_GEN_ALL_CORE_FT
PAST SURGICAL HISTORY:  Bone spur Lt foot 5th digit 2014    H/O bilateral breast biopsy 2002    History of cholecystectomy 2000    S/P tonsillectomy childhood

## 2022-09-13 NOTE — CONSULT NOTE ADULT - ATTENDING COMMENTS
Pt seen and examined. MRI reviewed.  Mult intracerebral mets - 7    Will discuss with RT re: GK SRS vs WBRT

## 2022-09-13 NOTE — CONSULT NOTE ADULT - SUBJECTIVE AND OBJECTIVE BOX
CHIEF COMPLAINT:Patient is a 76y old  Female who presents with a chief complaint of ams/ sob (13 Sep 2022 20:59)      HPI:  75 yo F h/o colon CA with  mets was on   chemo ,  no longer on oral chemo x  past  1 mo p/w AMS x4 weeks, worsening over the last few days.     had also been c/o right sided rib pain for weeks as well.sob  on exertion x days.     pmd  d r brand   sent pt in for further evaluation as pt presented to her with AMS, worsening weakness and lethargy.     Per , pt slipped and fell in the bathroom while getting up from the toilet 3 weeks ago.    Unsure if she had any LOC. Has had unsteady gait since.      +intermittent episodes of diarrhea     Denies nausea, vomiting, fever, chills, cough, chest pain, blood in stool, urinary complaints, headache.       PAST MEDICAL & SURGICAL HISTORY:  COPD (chronic obstructive pulmonary disease)  mild      Colon cancer      History of cholecystectomy  2000      Bone spur  Lt foot 5th digit 2014      H/O bilateral breast biopsy  2002      S/P tonsillectomy  childhood          MEDICATIONS  (STANDING):  dextrose 5% + sodium chloride 0.45%. 1000 milliLiter(s) (50 mL/Hr) IV Continuous <Continuous>    MEDICATIONS  (PRN):      FAMILY HISTORY:      SOCIAL HISTORY:    [x ] Non-smoker  [ ] Smoker  [ ] Alcohol    Allergies    Levaquin ([place])    Intolerances    	    REVIEW OF SYSTEMS:  CONSTITUTIONAL: No fever, weight loss, or fatigue  EYES: No eye pain, visual disturbances, or discharge  ENT:  No difficulty hearing, tinnitus, vertigo; No sinus or throat pain  NECK: No pain or stiffness  RESPIRATORY: No cough, wheezing, chills or hemoptysis; No Shortness of Breath  CARDIOVASCULAR: No chest pain, palpitations, passing out, dizziness, or leg swelling  GASTROINTESTINAL: No abdominal or epigastric pain. No nausea, vomiting, or hematemesis; No diarrhea or constipation. No melena or hematochezia.  GENITOURINARY: No dysuria, frequency, hematuria, or incontinence  NEUROLOGICAL: No headaches, memory loss, loss of strength, numbness, or tremors  SKIN: No itching, burning, rashes, or lesions   LYMPH Nodes: No enlarged glands  ENDOCRINE: No heat or cold intolerance; No hair loss  MUSCULOSKELETAL: No joint pain or swelling; No muscle, back, or extremity pain  PSYCHIATRIC: No depression, anxiety, mood swings, or difficulty sleeping  HEME/LYMPH: No easy bruising, or bleeding gums  ALLERGY AND IMMUNOLOGIC: No hives or eczema	    [ ] All others negative	  [x ] Unable to obtain    PHYSICAL EXAM:  T(C): 36.6 (09-13-22 @ 16:33), Max: 36.6 (09-13-22 @ 16:33)  HR: 98 (09-13-22 @ 16:33) (98 - 104)  BP: 124/82 (09-13-22 @ 16:33) (108/56 - 124/82)  RR: 18 (09-13-22 @ 16:33) (18 - 18)  SpO2: 98% (09-13-22 @ 16:33) (93% - 98%)  Wt(kg): --  I&O's Summary      Appearance: Normal	  HEENT:   Normal oral mucosa, PERRL, EOMI	  Lymphatic: No lymphadenopathy  Cardiovascular: Normal S1 S2, No JVD, + murmurs, No edema  Respiratory: Lungs clear to auscultation	  Gastrointestinal:  Soft, Non-tender, + BS	  Skin: No rashes, No ecchymoses, No cyanosis	  Extremities: Normal range of motion, No clubbing, cyanosis or edema  Vascular: Peripheral pulses palpable 2+ bilaterally    TELEMETRY: 	    ECG:  	  RADIOLOGY:  OTHER: 	  	  LABS:	 	    CARDIAC MARKERS:                              12.3   14.67 )-----------( 466      ( 13 Sep 2022 18:51 )             40.6     09-13    134<L>  |  91<L>  |  23  ----------------------------<  122<H>  3.7   |  28  |  0.94    Ca    10.6<H>      13 Sep 2022 18:51    TPro  8.7<H>  /  Alb  4.1  /  TBili  0.7  /  DBili  x   /  AST  35  /  ALT  16  /  AlkPhos  263<H>  09-13    proBNP: Serum Pro-Brain Natriuretic Peptide: 2287 pg/mL (09-13 @ 18:51)    Lipid Profile:   HgA1c:   TSH:   PT/INR - ( 13 Sep 2022 18:51 )   PT: 14.0 sec;   INR: 1.20 ratio         PTT - ( 13 Sep 2022 18:51 )  PTT:26.3 sec    PREVIOUS DIAGNOSTIC TESTING:    < from: CT Angio Chest PE Protocol w/ IV Cont (09.13.22 @ 18:48) >  Segmental and subsegmental acute pulmonary arterial emboli as detailed   above. Presence of pulmonary embolism was discussed with Dr. Aguirre by Dr. Orta on September 13, 2022 at 8:03 PM.    Extensive metastatic disease as detailed above.    Small multiloculated bilateral pleural effusions.    Compression fracture deformity of the T4 vertebral body with some   retropulsion of the fractured vertebral body into the spinal canal.   Dedicated thoracic spine MRI can be performed for complete evaluation as   clinically warranted.    < from: CT Head No Cont (09.13.22 @ 18:46) >  Multiple hyperdense nodules are may represent hemorrhagic metastasis in a   patient with history of colon carcinoma. Further evaluation with contrast   enhanced brain MRI is recommended.  No acute intracranial hemorrhage.

## 2022-09-13 NOTE — ED PROVIDER NOTE - OBJECTIVE STATEMENT
77 yo F with a PMH of colon CA with ?mets (per family) recently completed chemo tx no longer on oral chemo x 1 mo p/w AMS x4 weeks, worsening over the last few days. Has also been c/o right sided rib pain for weeks as well. SOB on exertion x days. Was seen today by PMD, Dr. Tam who sent pt in for further evaluation as pt presented to her with AMS, worsening weakness and lethargy. Per , pt slipped and fell in the bathroom while getting up from the toilet 3 weeks ago. Unsure if she had any LOC. Has had unsteady gait since.  +intermittent episodes of diarrhea. Denies nausea, vomiting, fever, chills, cough, chest pain, blood in stool, urinary complaints, headache. No sick contacts.

## 2022-09-13 NOTE — CONSULT NOTE ADULT - ASSESSMENT
75 yo F h/o colon CA with  mets was on   chemo ,  no longer on oral chemo x  past  1 mo p/w AMS x4 weeks, worsening over the last few days.     had also been c/o right sided rib pain for weeks as well.sob  on exertion x days.     pmd  d r brand   sent pt in for further evaluation as pt presented to her with AMS, worsening weakness and lethargy.     Per , pt slipped and fell in the bathroom while getting up from the toilet 3 weeks ago.    Unsure if she had any LOC. Has had unsteady gait since.      +intermittent episodes of diarrhea     Denies nausea, vomiting, fever, chills, cough, chest pain, blood in stool, urinary complaints, headache.   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  AC contraindicated  pt needs IVC filter any way despite doppler  check le venous doppler  echo

## 2022-09-13 NOTE — ED PROVIDER NOTE - PROGRESS NOTE DETAILS
Neurosx aware, will come sofie Harry PA-C Attempted to call Dr. Marion, no return call back  Spoke with Dr. Umanzor who accepted pt for admission  ED Attending was called by Rads that pt also has a RUL subseg PE. Given hemorrhagic mets no AC at this time. This was also verbalized to Dr. Doni Harry, ZENY  at bedside, Dalton Bucio  Discussed pts current condition and findings with him. Answered all questions.   Is HCP and reports pt is DNR/DNI  MOLST form filled out and placed in chart  Also discussed findings with pts daughter, Donna  Renetta Harry PA-C Pts family now disagreeing about code status  Will remain FULL CODE until further discussion among family  Dr. Ghotra notified  Renetta Harry PA-C Neurosx aware, will come asteral  Renetta Harry PA-C    Attending MD Cervantes: Patient informed of CT results.

## 2022-09-13 NOTE — H&P ADULT - NSHPLABSRESULTS_GEN_ALL_CORE
LABS:                        12.3   14.67 )-----------( 466      ( 13 Sep 2022 18:51 )             40.6     09-13    134<L>  |  91<L>  |  23  ----------------------------<  122<H>  3.7   |  28  |  0.94    Ca    10.6<H>      13 Sep 2022 18:51    TPro  8.7<H>  /  Alb  4.1  /  TBili  0.7  /  DBili  x   /  AST  35  /  ALT  16  /  AlkPhos  263<H>  09-13    PT/INR - ( 13 Sep 2022 18:51 )   PT: 14.0 sec;   INR: 1.20 ratio         PTT - ( 13 Sep 2022 18:51 )  PTT:26.3 sec            09-13 @ 18:25  3.5  23

## 2022-09-13 NOTE — CONSULT NOTE ADULT - SUBJECTIVE AND OBJECTIVE BOX
p (1480)    76F hx stage 3 colon ca w/ known mets to lung. Last chemo reg finished 1mo ago. Has had AMS/confusion x 1mo, worsening over last few days. Has also had unsteady gait x 1mo. CTH: mult hyperdense lesions c/f mets (?heme). CT CAP showing acute PE, pleural effusions, ext mets disease, T4 VB comp fx w/ retropulsion. Exam: AOx3, PERRL, EOMI, no facial, no drift, LLE HF/HE 4+/5 PF 4/5 otherwise BAUTISTA 5/5, SILT    --Anticoagulation:    =====================  PAST MEDICAL HISTORY   COPD (chronic obstructive pulmonary disease)    Colon cancer      PAST SURGICAL HISTORY   History of cholecystectomy    Bone spur    H/O bilateral breast biopsy    S/P tonsillectomy      Levaquin ([place])      MEDICATIONS:  Antibiotics:    Neuro:    Other:  dextrose 5% + sodium chloride 0.45%. 1000 milliLiter(s) IV Continuous <Continuous>      SOCIAL HISTORY:   Occupation:   Marital Status:     FAMILY HISTORY:      ROS: Negative except per HPI    LABS:  PT/INR - ( 13 Sep 2022 18:51 )   PT: 14.0 sec;   INR: 1.20 ratio         PTT - ( 13 Sep 2022 18:51 )  PTT:26.3 sec                        12.3   14.67 )-----------( 466      ( 13 Sep 2022 18:51 )             40.6     09-13    134<L>  |  91<L>  |  23  ----------------------------<  122<H>  3.7   |  28  |  0.94    Ca    10.6<H>      13 Sep 2022 18:51    TPro  8.7<H>  /  Alb  4.1  /  TBili  0.7  /  DBili  x   /  AST  35  /  ALT  16  /  AlkPhos  263<H>  09-13

## 2022-09-13 NOTE — ED CLERICAL - NS ED CLERK NOTE PRE-ARRIVAL INFORMATION; ADDITIONAL PRE-ARRIVAL INFORMATION
CC/Reason For referral: change in mental status, pt has history of colon cancer.  Preferred Consultant(if applicable):  Who admits for you (if needed):  Do you have documents you would like to fax over?  Would you still like to speak to an ED attending? yes

## 2022-09-13 NOTE — H&P ADULT - NSHPREVIEWOFSYSTEMS_GEN_ALL_CORE
REVIEW OF SYSTEMS:  GEN: no fever,    no chills  RESP:  hadSOB,   no cough  CVS: no chest pain,   no palpitations  GI: no abdominal pain,   no nausea,   no vomiting,   no constipation,   no diarrhea  : no dysuria,   no frequency  NEURO: no headache,   no dizziness  PSYCH: no depression,   not anxious  Derm : no rash

## 2022-09-13 NOTE — H&P ADULT - ASSESSMENT
77 yo F        h/o colon CA with  mets      was on   chemo ,  no longer on oral chemo x  past  1 mo     p/w AMS x4 weeks, worsening over the last few days.     had also been c/o right sided rib pain for weeks as well.sob  on exertion x days.     pmd  d r brand   sent pt in for further evaluation as pt presented to her with AMS, worsening weakness and lethargy.     Per , pt slipped and fell in the bathroom while getting up from the toilet 3 weeks ago.    Unsure if she had any LOC. Has had unsteady gait since.      +intermittent episodes of diarrhea     Denies nausea, vomiting, fever, chills, cough, chest pain, blood in stool, urinary complaints, headache.     admitted   with  ams.  from cerebral   mets  with bleed   h/o  metastatic ca colon, oncologist dr jose enrique ignacio   N/S  eval, no intervention   pt has no focal weakness    pt had  sob at  home,  none  at present /  card eval   CT  chest angio. per  er,  PE/  report. pending   unable to  a/c pt , given cerebral bleed    check doppler  legs   wbc  noted. from malignancy  afberile  dvt  ppx/ pas  fall risk/ PT eval       at bedside   pt is dnr/ dni/ Molst +   family  to  decide  on palliative care       rad< from: CT Head No Cont (09.13.22 @ 18:46) >  RESSION:  Multiple hyperdense nodules are may represent hemorrhagic metastasis in a   patient with history of colon carcinoma. Further evaluation with contrast   enhanced brain MRI is recommended.  No acute intracranial hemorrhage.  --- End of Report ---         77 yo F        h/o colon CA with  mets      was on   chemo ,  no longer on oral chemo x  past  1 mo     p/w AMS x4 weeks, worsening over the last few days.     had also been c/o right sided rib pain for weeks as well.sob  on exertion x days.     pmd  d r brand   sent pt in for further evaluation as pt presented to her with AMS, worsening weakness and lethargy.     Per , pt slipped and fell in the bathroom while getting up from the toilet 3 weeks ago.    Unsure if she had any LOC. Has had unsteady gait since.      +intermittent episodes of diarrhea     Denies nausea, vomiting, fever, chills, cough, chest pain, blood in stool, urinary complaints, headache.     admitted   with  ams.  from cerebral   mets  with bleed   h/o  metastatic ca colon, oncologist dr jose enrique ignacio   N/S  eval, no intervention   pt has no focal weakness    pt had  sob at  home,  none  at present /  card eval   CT  chest angio. per  er,  PE/  report. pending   unable to  a/c pt , given cerebral bleed/ family  aware of risks    check doppler  legs   wbc  noted. from malignancy  afberile  dvt  ppx/ pas  fall risk/ PT eval       at bedside / aware of  poor prognosis  states, pt is full code   family  to  decide  on palliative care       rad< from: CT Head No Cont (09.13.22 @ 18:46) >  RESSION:  Multiple hyperdense nodules are may represent hemorrhagic metastasis in a   patient with history of colon carcinoma. Further evaluation with contrast   enhanced brain MRI is recommended.  No acute intracranial hemorrhage.  --- End of Report ---

## 2022-09-13 NOTE — ED PROVIDER NOTE - RAPID ASSESSMENT
76F with PMHx of colon CA recently completed chemo tx presents to the ED c/o AMS x4 weeks, pain on R side, SOB on exertion sent in by Dr. Tam for further evaluation as pt has had decreased awareness, and lightheadedness when she stands. Per , pt slipped and fell in the bathroom while getting up from the toilet 3 weeks ago. Has had unsteady gait.  +intermittent episodes of diarrhea. Denies cough, or fevers.     Scribe Statement: LUIS, Vanna Cantu, attest that this documentation has been prepared under the direction and in the presence of Dimas Lozoya) 76F with PMHx of colon CA recently completed chemo tx presents to the ED c/o AMS x4 weeks, pain on R side, SOB on exertion sent in by Dr. Tam for further evaluation as pt has had decreased awareness, and lightheadedness when she stands. Per , pt slipped and fell in the bathroom while getting up from the toilet 3 weeks ago. Has had unsteady gait.  +intermittent episodes of diarrhea. Denies cough, or fevers.     Scribe Statement: I, Vanna Cantu, attest that this documentation has been prepared under the direction and in the presence of Dimas Lozoya)    Dr. Lozoya ED Attending- The scribe’s documentation has been prepared under my direction and personally reviewed by me.     Patient was rapidly assessed via telemedicine encounter; a limited history and physical exam was performed. The patient will be seen and further worked up in the main emergency department and their care will be completed by the main emergency department team. Receiving team will follow up on labs, analgesia, any clinical imaging, and perform reassessment and disposition of the patient as clinically indicated.  All decisions regarding the progression of care will be made at their discretion.

## 2022-09-13 NOTE — ED PROCEDURE NOTE - PROCEDURE ADDITIONAL DETAILS
POCUS: Emergency Department Focused Ultrasound performed at patient's bedside.  The complete report will be available in PACS.
Peripheral IV access in the Emergency Department obtained under dynamic ultrasound guidance with dark nonpulsatile blood return.  Catheter was flushed afterwards without any resistance or resulting extravasation.  IV catheter confirmed in compressible vein after insertion. 18 gauge catheter placed in a peripheral vein in the left upper extremity.

## 2022-09-13 NOTE — ED PROVIDER NOTE - CARE PLAN
Principal Discharge DX:	AMS (altered mental status)  Secondary Diagnosis:	Metastatic cancer to brain   1

## 2022-09-13 NOTE — ED ADULT TRIAGE NOTE - LOCATION:
[Exposed Vessel] : left anterior vessel not exposed [Clear to Auscultation] : lungs were clear to auscultation bilaterally [Wheezing] : no wheezing [Increased Work of Breathing] : no increased work of breathing with use of accessory muscles and retractions [Normal Gait and Station] : normal gait and station [Normal muscle strength, symmetry and tone of facial, head and neck musculature] : normal muscle strength, symmetry and tone of facial, head and neck musculature [Normal] : no cervical lymphadenopathy [de-identified] : thick Left arm; Right arm;

## 2022-09-13 NOTE — ED PROVIDER NOTE - ATTENDING APP SHARED VISIT CONTRIBUTION OF CARE
Attending MD Cervantes:   I personally have seen and examined this patient.  Physician assistant note reviewed and agree on plan of care and except where noted.  See below for details.     Seen in [facility], accompanied by     76F with PMH/PSH including colon cancer with mets, no longer on po chemo presents to the ED with change in baseline mental status sent in by Dr. Tam.  Patient reports that about a month ago she slipped and fell in the bathroom.  Reports feels she has been more unsteady since then.  Reports has been feeling R sided rib pain and shortness of breath.  Reports shortness of breath has been getting worse, unclear timeline.  Denies chest pain.  Denies loss of vision, double vision.  Reports mild headache.  Denies nausea, vomiting, reports has had episodes of diarrhea.  Denies urinary complaints.   reports change in gait, increasingly unsteady.      Exam:   General: temporal wasting, chronically ill appearing, AAO2  HENT: head NCAT, airway patent with dry mucous membranes  Eyes: anicteric, no conjunctival injection, EOMI  Lungs: lungs scattered crackles at bases, with good inspiratory effort, no wheezing, no rhonchi, no accessory muscle use, no increased work of breathing  Cardiac: +S1S2, no obvious m/r/g  GI: abdomen soft with +BS, NT, ND  MSK: ranging neck and extremities freely, no calf tenderness, swelling, erythema or warmth   Neuro: moving all extremities spontaneously, sensory grossly intact, no gross neuro deficits  Skin: scattered ecchymosis on UEs, no petechiae    A/P: 76F with active malignancy, shortness of breath, concern for PE, ?progression of disease, effusions, change in mental status, concern for mets to brain, will obtain labs for metabolic derangement, will obtain EKG, CXR, eval for infectious process, effusions, will obtain CTH, CTA Chest, CXR, CTAP, labs, UA, admit    ADDENDUM: Spoke with patient's doctor, reports three weeks ago patient was chatting and walking in the office, and marked change today.  Work up in infancy.

## 2022-09-13 NOTE — ED PROVIDER NOTE - PHYSICAL EXAMINATION
CONSTITUTIONAL: Chronically ill and very weak appearing.   ENT: Airway patent, dry mucous membranes.   EYES: Pupils equal, round and reactive to light. EOMI. Pale conjunctiva.  CARDIAC: Normal rate, regular rhythm.  Heart sounds S1, S2.    RESPIRATORY: Breath sounds clear and equal bilaterally.   CHEST: TTP over right lower ribs. No bruising or swelling.   GASTROINTESTINAL: Abdomen soft, non-tender, not distended.  MUSCULOSKELETAL: Spine appears normal.  NEUROLOGICAL: Alert and oriented x2, repeats herself and will randomly say things without being asked a question. Otherwise grossly normal neuro exam. Equal muscle strength throughout.  SKIN: Skin pale in color, warm, dry and intact. Bruising over extremities.   PSYCHIATRIC: Normal mood and affect.

## 2022-09-13 NOTE — H&P ADULT - NSHPPHYSICALEXAM_GEN_ALL_CORE
PHYSICAL EXAMINATION:  Vital Signs Last 24 Hrs  T(C): 36.6 (13 Sep 2022 16:33), Max: 36.6 (13 Sep 2022 16:33)  T(F): 97.9 (13 Sep 2022 16:33), Max: 97.9 (13 Sep 2022 16:33)  HR: 98 (13 Sep 2022 16:33) (98 - 104)  BP: 124/82 (13 Sep 2022 16:33) (108/56 - 124/82)  BP(mean): --  RR: 18 (13 Sep 2022 16:33) (18 - 18)  SpO2: 98% (13 Sep 2022 16:33) (93% - 98%)    Parameters below as of 13 Sep 2022 16:33  Patient On (Oxygen Delivery Method): room air      CAPILLARY BLOOD GLUCOSE            GENERAL: NAD, well-groomed,  HEAD:  atraumatic, normocephalic  EYES: sclera anicteric  ENMT: mucous membranes moist  NECK: supple, No JVD  CHEST/LUNG: clear to auscultation bilaterally;    no      rales   ,   no rhonchi,   HEART: normal S1, S2  ABDOMEN: BS+, soft, ND, NT   EXTREMITIES:    no    edema    b/l LEs  NEURO: awake, ,     moves all extremities  SKIN: no     rash  able to  answer  questions/     no  focal  weaknes

## 2022-09-13 NOTE — ED PROCEDURE NOTE - US CPT CODES
16718 US Chest (PTX, Pleural Effussion/CHF vs COPD)/70697 Echocardiography Transthoracic with Image 2D (Echo/FAST)
23226 Guidance for Vascular Access

## 2022-09-13 NOTE — CONSULT NOTE ADULT - ASSESSMENT
76F hx stage 3 colon ca w/ known mets to lung. Last chemo reg finished 1mo ago. Has had AMS/confusion x 1mo, worsening over last few days. Has also had unsteady gait x 1mo. CTH: mult hyperdense lesions c/f mets (?heme). CT CAP showing acute PE, pleural effusions, ext mets disease, T4 VB comp fx w/ retropulsion. Exam: AOx3, PERRL, EOMI, no facial, no drift, LLE HF/HE 4+/5 PF 4/5 otherwise BAUTISTA 5/5, SILT  -No acute nsgy intervention  -Adm med  -MRI brain wwo, MR T spine w/o  -TLSO brace when OOB for comfort  -Coags slightly elevated but not concerning ISO ?hemorrhagic mets  -Dex 4q6  -Keppra 500bid

## 2022-09-14 NOTE — PROGRESS NOTE ADULT - SUBJECTIVE AND OBJECTIVE BOX
Patient seen and examined at bedside.    --Anticoagulation--    T(C): 36.8 (09-14-22 @ 04:29), Max: 36.8 (09-14-22 @ 04:29)  HR: 76 (09-14-22 @ 04:29) (76 - 104)  BP: 136/74 (09-14-22 @ 04:29) (108/56 - 155/77)  RR: 18 (09-14-22 @ 04:29) (18 - 18)  SpO2: 98% (09-14-22 @ 04:29) (93% - 98%)  Wt(kg): --    Exam: AOx3, PERRL, EOMI, no facial, no drift, LLE HF/HE 4+/5 PF 4/5 otherwise BAUTISTA 5/5, SILT

## 2022-09-14 NOTE — PATIENT PROFILE ADULT - FALL HARM RISK - HARM RISK INTERVENTIONS

## 2022-09-14 NOTE — PROGRESS NOTE ADULT - SUBJECTIVE AND OBJECTIVE BOX
CARDIOLOGY     PROGRESS  NOTE   ________________________________________________    CHIEF COMPLAINT:Patient is a 76y old  Female who presents with a chief complaint of ams/ sob (14 Sep 2022 08:13)  no complain.  	  REVIEW OF SYSTEMS:  CONSTITUTIONAL: No fever, weight loss, or fatigue  EYES: No eye pain, visual disturbances, or discharge  ENT:  No difficulty hearing, tinnitus, vertigo; No sinus or throat pain  NECK: No pain or stiffness  RESPIRATORY: No cough, wheezing, chills or hemoptysis; + Shortness of Breath  CARDIOVASCULAR: No chest pain, palpitations, passing out, dizziness, or leg swelling  GASTROINTESTINAL: No abdominal or epigastric pain. No nausea, vomiting, or hematemesis; No diarrhea or constipation. No melena or hematochezia.  GENITOURINARY: No dysuria, frequency, hematuria, or incontinence  NEUROLOGICAL: No headaches, memory loss, loss of strength, numbness, or tremors  SKIN: No itching, burning, rashes, or lesions   LYMPH Nodes: No enlarged glands  ENDOCRINE: No heat or cold intolerance; No hair loss  MUSCULOSKELETAL: No joint pain or swelling; No muscle, back, or extremity pain  PSYCHIATRIC: No depression, anxiety, mood swings, or difficulty sleeping  HEME/LYMPH: No easy bruising, or bleeding gums  ALLERGY AND IMMUNOLOGIC: No hives or eczema	    [ ] All others negative	  [ ] Unable to obtain    PHYSICAL EXAM:  T(C): 36.8 (09-14-22 @ 04:29), Max: 36.8 (09-14-22 @ 04:29)  HR: 76 (09-14-22 @ 04:29) (76 - 104)  BP: 136/74 (09-14-22 @ 04:29) (108/56 - 155/77)  RR: 18 (09-14-22 @ 04:29) (18 - 18)  SpO2: 98% (09-14-22 @ 04:29) (93% - 98%)  Wt(kg): --  I&O's Summary      Appearance: Normal	  HEENT:   Normal oral mucosa, PERRL, EOMI	  Lymphatic: No lymphadenopathy  Cardiovascular: Normal S1 S2, No JVD, + murmurs, No edema  Respiratory: rhonchi  Gastrointestinal:  Soft, Non-tender, + BS	  Skin: No rashes, No ecchymoses, No cyanosis	  Extremities: Normal range of motion, No clubbing, cyanosis or edema  Vascular: Peripheral pulses palpable 2+ bilaterally    MEDICATIONS  (STANDING):  dexAMETHasone     Tablet 4 milliGRAM(s) Oral every 6 hours  dextrose 5% + sodium chloride 0.45%. 1000 milliLiter(s) (50 mL/Hr) IV Continuous <Continuous>  levETIRAcetam 500 milliGRAM(s) Oral two times a day  senna 2 Tablet(s) Oral at bedtime      TELEMETRY: 	    ECG:  	  RADIOLOGY:  OTHER: 	  	  LABS:	 	    CARDIAC MARKERS:                                10.7   11.05 )-----------( 356      ( 14 Sep 2022 07:00 )             34.7     09-14    135  |  95<L>  |  20  ----------------------------<  121<H>  4.0   |  29  |  0.62    Ca    9.6      14 Sep 2022 07:00    TPro  8.7<H>  /  Alb  4.1  /  TBili  0.7  /  DBili  x   /  AST  35  /  ALT  16  /  AlkPhos  263<H>  09-13    proBNP: Serum Pro-Brain Natriuretic Peptide: 2287 pg/mL (09-13 @ 18:51)    Lipid Profile:   HgA1c:   TSH:   PT/INR - ( 13 Sep 2022 18:51 )   PT: 14.0 sec;   INR: 1.20 ratio         PTT - ( 13 Sep 2022 18:51 )  PTT:26.3 sec  < from: POCUS ED TTE 2D F/U, Limited w/o Cont. (09.13.22 @ 19:42) >  INTERPRETATION:  A focused transthoracic cardiac and thoracic ultrasound   examination was performed.  Technically limited study.  No pericardial effusion was present.  No global wall motion abnormality was identified, unable to fully   visualize the left ventricle.    Subpleural consolidations with associated B-lines noted in the left   apical and right axillary lung fields.  Otherwise lung fields A-line predominant.  There was a small right-sided pleural effusion.  There was a moderate left-sided pleural effusion.    IMPRESSION:  No Pericardial Effusion.  Small right-sided pleural effusion. Moderate left-sided pleural effusion.        Assessment and plan  ---------------------------  77 yo F h/o colon CA with  mets was on   chemo ,  no longer on oral chemo x  past  1 mo p/w AMS x4 weeks, worsening over the last few days.     had also been c/o right sided rib pain for weeks as well.sob  on exertion x days.     pmd  d r brand   sent pt in for further evaluation as pt presented to her with AMS, worsening weakness and lethargy.     Per , pt slipped and fell in the bathroom while getting up from the toilet 3 weeks ago.    Unsure if she had any LOC. Has had unsteady gait since.      +intermittent episodes of diarrhea     Denies nausea, vomiting, fever, chills, cough, chest pain, blood in stool, urinary complaints, headache.   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  AC contraindicated  pt needs IVC filter any way despite doppler  check le venous doppler  echo in er noted with normal ef and bl pleural effusion  may add lasix 20 mg iv daily  goc ?palliative care/ onc

## 2022-09-14 NOTE — PROGRESS NOTE ADULT - SUBJECTIVE AND OBJECTIVE BOX
CHART NOTE    Palliative consulted for goals of care and pain management.   Chart reviewed.    76F hx stage 3 colon ca w/ known mets to lung. Last chemo tx approx 1mo ago. Pt with AMS and unsteady gait for appox 1 month. Presented with rib pain after fall.      Case discussed with primary team ACP Vangie. Per ACP pt with right sided pain which pt reports was present prior to fall and felt to be related to malignancy. ISTOP reviewed #[id number]. Pt with rx on 9/8 for oxycodone IR 15mg 75 tabs for 5 days. ACP also requesting family meeting to address GOC with patient and family as patient's mental status is now improved.     Recommendations:   For malignancy related pain   -Tylenol 650mg q6h PRN mild pain   -oxycodone 10mg q4-6h PRN moderate pain   -oxycodone 15mg q4-6h PRN severe pain   Please order above with holding parameters for sedation of RR<10     Regarding GOC please coordination family meeting. Palliative team available 11AM or 1PM tomorrow.   All recommendations discussed with primary team ACP. Full consult to follow tomorrow.     Lyn Villagomez MD  Geriatrics and Palliative Medicine Attending  [facility] pager: [phone]     The Geriatrics and Palliative Medicine consult service has 24/7 coverage for medical recommendations, including symptom management needs.

## 2022-09-14 NOTE — PROGRESS NOTE ADULT - SUBJECTIVE AND OBJECTIVE BOX
afberile  REVIEW OF SYSTEMS:  GEN: no fever,    no chills  RESP: no SOB,   no cough  CVS: no chest pain,   no palpitations  GI: no abdominal pain,   no nausea,   no vomiting,   no constipation,   no diarrhea  : no dysuria,   no frequency  NEURO: no headache,   no dizziness  PSYCH: no depression,   not anxious  Derm : no rash    MEDICATIONS  (STANDING):  dexAMETHasone     Tablet 4 milliGRAM(s) Oral every 6 hours  dextrose 5% + sodium chloride 0.45%. 1000 milliLiter(s) (50 mL/Hr) IV Continuous <Continuous>  levETIRAcetam 500 milliGRAM(s) Oral two times a day  senna 2 Tablet(s) Oral at bedtime    MEDICATIONS  (PRN):  oxycodone    5 mG/acetaminophen 325 mG 1 Tablet(s) Oral once PRN Moderate Pain (4 - 6)      Vital Signs Last 24 Hrs  T(C): 36.8 (14 Sep 2022 04:29), Max: 36.8 (14 Sep 2022 04:29)  T(F): 98.3 (14 Sep 2022 04:29), Max: 98.3 (14 Sep 2022 04:29)  HR: 76 (14 Sep 2022 04:29) (76 - 104)  BP: 136/74 (14 Sep 2022 04:29) (108/56 - 155/77)  BP(mean): --  RR: 18 (14 Sep 2022 04:29) (18 - 18)  SpO2: 98% (14 Sep 2022 04:29) (93% - 98%)    Parameters below as of 14 Sep 2022 04:29  Patient On (Oxygen Delivery Method): room air      CAPILLARY BLOOD GLUCOSE        I&O's Summary      PHYSICAL EXAM:  HEAD:  Atraumatic, Normocephalic  NECK: Supple, No   JVD  CHEST/LUNG:   no     rales,     no,    rhonchi  HEART: Regular rate and rhythm;         murmur  ABDOMEN: Soft, Nontender, ;   EXTREMITIES:   no     edema  NEUROLOGY:  alert    LABS:                        10.7   11.05 )-----------( 356      ( 14 Sep 2022 07:00 )             34.7     09-14    135  |  95<L>  |  20  ----------------------------<  121<H>  4.0   |  29  |  0.62    Ca    9.6      14 Sep 2022 07:00    TPro  8.7<H>  /  Alb  4.1  /  TBili  0.7  /  DBili  x   /  AST  35  /  ALT  16  /  AlkPhos  263<H>  09-13    PT/INR - ( 13 Sep 2022 18:51 )   PT: 14.0 sec;   INR: 1.20 ratio         PTT - ( 13 Sep 2022 18:51 )  PTT:26.3 sec      Urinalysis Basic - ( 13 Sep 2022 22:45 )    Color: Yellow / Appearance: Clear / SG: >1.050 / pH: x  Gluc: x / Ketone: Negative  / Bili: Negative / Urobili: Negative   Blood: x / Protein: 30 mg/dL / Nitrite: Negative   Leuk Esterase: Negative / RBC: 27 /hpf / WBC 18 /HPF   Sq Epi: x / Non Sq Epi: 8 / Bacteria: Negative      Lactate, Blood: 1.3 mmol/L (09-13 @ 21:41)      09-13 @ 18:25  3.5  23              Consultant(s) Notes Reviewed:      Care Discussed with Consultants/Other Providers:

## 2022-09-14 NOTE — PROGRESS NOTE ADULT - ASSESSMENT
75 yo F        h/o colon CA with  mets      was on   chemo ,  no longer on oral chemo x  past  1 mo     p/w AMS x4 weeks, worsening over the last few days.     had also been c/o right sided rib pain for weeks as well.sob  on exertion x days.     pmd  d r brand   sent pt in for further evaluation as pt presented to her with AMS, worsening weakness and lethargy.     Per , pt slipped and fell in the bathroom while getting up from the toilet 3 weeks ago.    Unsure if she had any LOC. Has had unsteady gait since.      +intermittent episodes of diarrhea     Denies nausea, vomiting, fever, chills, cough, chest pain, blood in stool, urinary complaints, headache.     admitted   with  ams.  from cerebral   mets  with bleed   h/o  metastatic ca colon, oncologist dr jose enrique ignacio   N/S  eval, no intervention   pt has no focal weakness    pt had  sob at  home,  none  at present /  card eval   CT  chest angio. per  er,  PE/  report. pending   unable to  a/c pt , given cerebral bleed/ family  aware of risks    check doppler  legs   wbc  noted. from malignancy  afberile  dvt  ppx/ pas  fall risk/ PT eval      aware of  poor prognosis  states, pt is full code      awiat leg  dopplers/  on decedron for  h'gic mets       rad< from: CT Head No Cont (09.13.22 @ 18:46) >  RESSION:  Multiple hyperdense nodules are may represent hemorrhagic metastasis in a   patient with history of colon carcinoma. Further evaluation with contrast   enhanced brain MRI is recommended.  No acute intracranial hemorrhage.  --- End of Report ---         77 yo F        h/o colon CA with  mets      was on   chemo ,  no longer on oral chemo x  past  1 mo     p/w AMS x4 weeks, worsening over the last few days.     had also been c/o right sided rib pain for weeks as well.sob  on exertion x days.     pmd  d r brand   sent pt in for further evaluation as pt presented to her with AMS, worsening weakness and lethargy.     Per , pt slipped and fell in the bathroom while getting up from the toilet 3 weeks ago.    Unsure if she had any LOC. Has had unsteady gait since.      +intermittent episodes of diarrhea     Denies nausea, vomiting, fever, chills, cough, chest pain, blood in stool, urinary complaints, headache.     admitted   with  ams.  from cerebral   mets  with bleed   h/o  metastatic ca colon, oncologist dr jose enrique ignacio   N/S  eval, no intervention   pt has no focal weakness    pt had  sob at  home,  none  at present /  card eval   CT  chest angio. ,Pe, b/l pl effusions, hepatic mets    unable to  a/c pt , given cerebral bleed/ family  aware of risks    check doppler  legs   wbc  noted. from malignancy  afberile  dvt  ppx/ pas  fall risk/ PT eval      aware of  poor prognosis  states, pt is full code/ oncology d  rohri/       awiat leg  dopplers/  on decedron for  h'gic mets       rad< from: CT Head No Cont (09.13.22 @ 18:46) >  RESSION:  Multiple hyperdense nodules are may represent hemorrhagic metastasis in a   patient with history of colon carcinoma. Further evaluation with contrast   enhanced brain MRI is recommended.  No acute intracranial hemorrhage.  --- End of Report ---         77 yo F        h/o colon CA with  mets      was on   chemo ,  no longer on oral chemo x  past  1 mo     p/w AMS x4 weeks, worsening over the last few days.     had also been c/o right sided rib pain for weeks as well.sob  on exertion x days.     pmd  d r brand   sent pt in for further evaluation as pt presented to her with AMS, worsening weakness and lethargy.     Per , pt slipped and fell in the bathroom while getting up from the toilet 3 weeks ago.    Unsure if she had any LOC. Has had unsteady gait since.      +intermittent episodes of diarrhea     Denies nausea, vomiting, fever, chills, cough, chest pain, blood in stool, urinary complaints, headache.     admitted   with  ams.  from cerebral   mets  with bleed   h/o  metastatic ca colon, oncologist dr jose enrique ignacio   N/S  eval, no intervention   pt has no focal weakness    pt had  sob at  home,  none  at present /  card eval   CT  chest angio. ,Pe, b/l pl effusions, hepatic mets /        T4 comp fx, with bony retropulsion,   TLSO  brace  per  N/S    unable to  a/c pt , given cerebral bleed/ family  aware of risks   check doppler  legs   wbc  noted. from malignancy  afebrile   dvt  ppx/ pas  fall risk/ PT eval   oncology d  rohri/     await   leg  dopplers/  on Decadron  for  h'gic mets        aware of  poor prognosis  states, pt is full code/ awiat  further  family  decision         rad< from: CT Head No Cont (09.13.22 @ 18:46) >  RESSION:  Multiple hyperdense nodules are may represent hemorrhagic metastasis in a   patient with history of colon carcinoma. Further evaluation with contrast   enhanced brain MRI is recommended.  No acute intracranial hemorrhage.  --- End of Report ---       RAD< from: CT Abdomen and Pelvis w/ IV Cont (09.13.22 @ 18:48) >  IMPRESSION:  Segmental and subsegmental acute pulmonary arterial emboli as detailed   above. Presence of pulmonary embolism was discussed with Dr. Aguirre by Dr. Orta on September 13, 2022 at 8:03 PM.  Extensive metastatic disease as detailed above.  Small multiloculated bilateral pleural effusions.  Compression fracture deformity of the T4 vertebral body with some   retropulsion of the fractured vertebral body into the spinal canal.   Dedicated thoracic spine MRI can be performed for complete evaluation as   clinically warranted.  --- End of Report ---

## 2022-09-14 NOTE — PATIENT PROFILE ADULT - NSPROPTRIGHTREPPHONE_GEN_A_NUR
Physical Therapy      Received PT evaluation orders for gait training with pt pre-op a right knee arthroscopy with partial medical meniscectomy. Pt having crutches at bedside with time educating pt and spouse on crutch training and adjusting to pt's height. Pt reports using crutches in the past any denying any concerns with his ability to utilize axillary crutches. Time educating pt and spouse on crutch adjustments, crutch sequencing, stair sequencing and HEP with pt verbalized his understanding.                     Therapy procedure time and total treatment time can be found documented on the Time Entry flowsheet  
[phone]

## 2022-09-15 NOTE — PHYSICAL THERAPY INITIAL EVALUATION ADULT - GROSSLY INTACT, SENSORY
Â· Schedule follow-up appointment with Yamilex Reid CNP in 4 months - stable for more than 12 months on current medications    Â· continue present medication and dosing schedule and make a to-do list/chart Grossly Intact

## 2022-09-15 NOTE — PHYSICAL THERAPY INITIAL EVALUATION ADULT - ADDITIONAL COMMENTS
Patient reports she lives with her spouse in a private house. She has 2 steps to enter and 13 steps to the bedroom. Patient reports her  assists her with ambulation (no AD) and with ADLs,.

## 2022-09-15 NOTE — PROGRESS NOTE ADULT - SUBJECTIVE AND OBJECTIVE BOX
CARDIOLOGY     PROGRESS  NOTE   ________________________________________________    CHIEF COMPLAINT:Patient is a 76y old  Female who presents with a chief complaint of ams/ sob (15 Sep 2022 05:23)  no complain.  	  REVIEW OF SYSTEMS:  CONSTITUTIONAL: No fever, weight loss, or fatigue  EYES: No eye pain, visual disturbances, or discharge  ENT:  No difficulty hearing, tinnitus, vertigo; No sinus or throat pain  NECK: No pain or stiffness  RESPIRATORY: No cough, wheezing, chills or hemoptysis; No Shortness of Breath  CARDIOVASCULAR: No chest pain, palpitations, passing out, dizziness, or leg swelling  GASTROINTESTINAL: No abdominal or epigastric pain. No nausea, vomiting, or hematemesis; No diarrhea or constipation. No melena or hematochezia.  GENITOURINARY: No dysuria, frequency, hematuria, or incontinence  NEUROLOGICAL: No headaches, memory loss, loss of strength, numbness, or tremors  SKIN: No itching, burning, rashes, or lesions   LYMPH Nodes: No enlarged glands  ENDOCRINE: No heat or cold intolerance; No hair loss  MUSCULOSKELETAL: No joint pain or swelling; No muscle, back, or extremity pain  PSYCHIATRIC: No depression, anxiety, mood swings, or difficulty sleeping  HEME/LYMPH: No easy bruising, or bleeding gums  ALLERGY AND IMMUNOLOGIC: No hives or eczema	    [ ] All others negative	  [ x] Unable to obtain    PHYSICAL EXAM:  T(C): 36.8 (09-15-22 @ 04:29), Max: 37 (09-14-22 @ 11:54)  HR: 75 (09-15-22 @ 04:29) (74 - 82)  BP: 154/84 (09-15-22 @ 04:29) (137/82 - 166/69)  RR: 18 (09-15-22 @ 04:29) (16 - 18)  SpO2: 94% (09-15-22 @ 04:29) (94% - 98%)  Wt(kg): --  I&O's Summary    14 Sep 2022 07:01  -  15 Sep 2022 07:00  --------------------------------------------------------  IN: 500 mL / OUT: 300 mL / NET: 200 mL        Appearance: Normal	  HEENT:   Normal oral mucosa, PERRL, EOMI	  Lymphatic: No lymphadenopathy  Cardiovascular: Normal S1 S2, No JVD, + murmurs, No edema  Respiratory: rhonchi	  Gastrointestinal:  Soft, Non-tender, + BS	  Skin: No rashes, No ecchymoses, No cyanosis	  Extremities: Normal range of motion, No clubbing, cyanosis or edema  Vascular: Peripheral pulses palpable 2+ bilaterally    MEDICATIONS  (STANDING):  dexAMETHasone     Tablet 4 milliGRAM(s) Oral every 6 hours  dextrose 5% + sodium chloride 0.45%. 1000 milliLiter(s) (50 mL/Hr) IV Continuous <Continuous>  furosemide   Injectable 20 milliGRAM(s) IV Push daily  levETIRAcetam 500 milliGRAM(s) Oral two times a day  senna 2 Tablet(s) Oral at bedtime      TELEMETRY: 	    ECG:  	  RADIOLOGY:  OTHER: 	  	  LABS:	 	    CARDIAC MARKERS:                                10.7   11.05 )-----------( 356      ( 14 Sep 2022 07:00 )             34.7     09-14    135  |  95<L>  |  20  ----------------------------<  121<H>  4.0   |  29  |  0.62    Ca    9.6      14 Sep 2022 07:00    TPro  8.7<H>  /  Alb  4.1  /  TBili  0.7  /  DBili  x   /  AST  35  /  ALT  16  /  AlkPhos  263<H>  09-13    proBNP: Serum Pro-Brain Natriuretic Peptide: 2287 pg/mL (09-13 @ 18:51)    Lipid Profile:   HgA1c:   TSH:   PT/INR - ( 13 Sep 2022 18:51 )   PT: 14.0 sec;   INR: 1.20 ratio         PTT - ( 13 Sep 2022 18:51 )  PTT:26.3 sec  -No acute nsgy intervention  -Adm med  -MRI brain with multiple brain mets: recommend Rad onc for radiosurgery  -TLSO brace when OOB for comfort  -Dex 4q6  -Keppra 500bid    For malignancy related pain   -Tylenol 650mg q6h PRN mild pain   -oxycodone 10mg q4-6h PRN moderate pain   -oxycodone 15mg q4-6h PRN severe pain   Please order above with holding parameters for sedation of RR<10         Assessment and plan  ---------------------------  75 yo F h/o colon CA with  mets was on   chemo ,  no longer on oral chemo x  past  1 mo p/w AMS x4 weeks, worsening over the last few days.     had also been c/o right sided rib pain for weeks as well.sob  on exertion x days.     pmd  d r brand   sent pt in for further evaluation as pt presented to her with AMS, worsening weakness and lethargy.     Per , pt slipped and fell in the bathroom while getting up from the toilet 3 weeks ago.    Unsure if she had any LOC. Has had unsteady gait since.      +intermittent episodes of diarrhea     Denies nausea, vomiting, fever, chills, cough, chest pain, blood in stool, urinary complaints, headache.   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  AC contraindicated  pt needs IVC filter any way despite doppler  check le venous doppler awaiting  echo in er noted with normal ef and bl pleural effusion with increase pro bnp  dc ivf  goc ?palliative care/ onc

## 2022-09-15 NOTE — CONSULT NOTE ADULT - PROBLEM SELECTOR RECOMMENDATION 5
Will continue to follow for GOC and symptom management.     For acute issues or uncontrolled symptoms please page palliative team.    Lyn Villagomez MD  Geriatrics and Palliative Medicine Attending  [facility] pager: [phone]     The Geriatrics and Palliative Medicine consult service has 24/7 coverage for medical recommendations, including symptom management needs.

## 2022-09-15 NOTE — PHYSICAL THERAPY INITIAL EVALUATION ADULT - CRITERIA FOR SKILLED THERAPEUTIC INTERVENTIONS
impairments found impairments found/functional limitations in following categories/risk reduction/prevention/rehab potential/anticipated discharge recommendation

## 2022-09-15 NOTE — PHYSICAL THERAPY INITIAL EVALUATION ADULT - PERTINENT HX OF CURRENT PROBLEM, REHAB EVAL
76 y.o F hx stage 3 colon ca w/ known mets to lung and brain. Last chemo reg finished 1mo ago. Has had AMS/confusion x 1mo, worsening over last few days. Has also had unsteady gait x 1mo. CTH: mult hyperdense lesions c/f mets. CT CAP showing acute PE, pleural effusions, ext mets disease, T4 VB comp fx w/ retropulsion.

## 2022-09-15 NOTE — CONSULT NOTE ADULT - SUBJECTIVE AND OBJECTIVE BOX
HPI:  75 yo F        h/o colon CA with  mets      was on   chemo ,  no longer on oral chemo x  past  1 mo     p/w AMS x4 weeks, worsening over the last few days.     had also been c/o right sided rib pain for weeks as well.sob  on exertion x days.     pmd  d r brand   sent pt in for further evaluation as pt presented to her with AMS, worsening weakness and lethargy.     Per , pt slipped and fell in the bathroom while getting up from the toilet 3 weeks ago.    Unsure if she had any LOC. Has had unsteady gait since.      +intermittent episodes of diarrhea     Denies nausea, vomiting, fever, chills, cough, chest pain, blood in stool, urinary complaints, headache. (13 Sep 2022 20:18)    PERTINENT PM/SXH:   COPD (chronic obstructive pulmonary disease)    Colon cancer    History of cholecystectomy    Bone spur    H/O bilateral breast biopsy    S/P tonsillectomy    FAMILY HISTORY:   No pertinent family history in first degree relatives.    ITEMS NOT CHECKED ARE NOT PRESENT    SOCIAL HISTORY:   Significant other/partner[x ]  Children[x ]  Sabianist/Spirituality:  Substance hx:  [ ]   Tobacco hx:  [ ]   Alcohol hx: [ ]   Home Opioid hx:  [x] I-Stop Reference No:   Living Situation: [x ]Home  [ ]Long term care  [ ]Rehab [ ]Other    ADVANCE DIRECTIVES:    DNR/MOLST  [ ]  Living Will  [ ]   DECISION MAKER(s): Patient  [ ] Health Care Proxy(s)  [ ] Surrogate(s)  [ ] Guardian           Name(s): Phone Number(s):    BASELINE (I)ADL(s) (prior to admission):  [place]: [ ]Total  [x] Moderate [ ]Dependent    Allergies    Levaquin (Flushing)    Intolerances    MEDICATIONS  (STANDING):  dexAMETHasone     Tablet 4 milliGRAM(s) Oral every 6 hours  dextrose 5% + sodium chloride 0.45%. 1000 milliLiter(s) (50 mL/Hr) IV Continuous <Continuous>  furosemide   Injectable 20 milliGRAM(s) IV Push daily  levETIRAcetam 500 milliGRAM(s) Oral two times a day  pantoprazole    Tablet 40 milliGRAM(s) Oral before breakfast  senna 2 Tablet(s) Oral at bedtime    MEDICATIONS  (PRN):  oxyCODONE    IR 10 milliGRAM(s) Oral every 6 hours PRN Moderate Pain (4 - 6)  oxyCODONE    IR 15 milliGRAM(s) Oral every 6 hours PRN Severe Pain (7 - 10)    PRESENT SYMPTOMS: [ ]Unable to self-report  [ ] CPOT [ ] PAINADs [ ] RDOS  Source if other than patient:  [ ]Family   [ ]Team     Pain: [x ]yes [ ]no  QOL impact - impacts ADLS  Location - epigastric region and right side of abdomen/flank region                   Aggravating factors - movement  Quality - throbbing  Radiation - none  Timing- constant   Severity (0-10 scale): 10  Minimal acceptable level (0-10 scale): 2-3    CPOT:    https://www.[email or web address].org/getattachment/[email or web address]/Critical-Care-Pain-Observation-Tool-(CPOT)    Dyspnea:      No                     [ ]Mild [ ]Moderate [ ]Severe  Anxiety:                             [ ]Mild [ ]Moderate [ ]Severe  Fatigue:                             [ ]Mild [ ]Moderate [ ]Severe  Nausea:                             [ ]Mild [ ]Moderate [ ]Severe  Loss of appetite:              [ ]Mild [ ]Moderate [ ]Severe  Constipation:                    [ ]Mild [ ]Moderate [ ]Severe    PCSSQ[Palliative Care Spiritual Screening Question]   Severity (0-10):  Score of 4 or > indicate consideration of Chaplaincy referral.  Chaplaincy Referral: [ ] yes [ ] refused [ ] following [x ] Deferred     Caregiver [place]? : [ ] yes [x ] no [ ] Deferred [ ] Declined             Social work referral [ ] Patient & Family Centered Care Referral [ ]     Anticipatory Grief present?:  [ ] yes [x ] no  [ ] Deferred                  Social work referral [ ] Chaplaincy Referral[ ]    Other Symptoms:  [x ]All other review of systems negative   [ ]Unable to obtain due to poor mentation    Palliative Performance Status Version 2:      40   %    http://npcrc.org/files/news/palliative_performance_scale_ppsv2.pdf  PHYSICAL EXAM:  Vital Signs Last 24 Hrs  T(C): 36.8 (15 Sep 2022 04:29), Max: 36.8 (14 Sep 2022 17:30)  T(F): 98.2 (15 Sep 2022 04:29), Max: 98.3 (14 Sep 2022 17:30)  HR: 77 (15 Sep 2022 08:46) (74 - 82)  BP: 154/79 (15 Sep 2022 08:46) (137/82 - 162/82)  BP(mean): --  RR: 18 (15 Sep 2022 04:29) (16 - 18)  SpO2: 95% (15 Sep 2022 08:46) (94% - 98%)    Parameters below as of 15 Sep 2022 08:46  Patient On (Oxygen Delivery Method): room air     I&O's Summary    14 Sep 2022 07:01  -  15 Sep 2022 07:00  --------------------------------------------------------  IN: 500 mL / OUT: 300 mL / NET: 200 mL       GENERAL:  [x]Alert  [x]Oriented x 3  [ ]Lethargic  [ ]Cachexia  [ ]Unarousable  [x]Verbal  [ ]Non-Verbal  Behavioral:   [ ]Anxiety  [ ]Delirium [ ]Agitation [ ]Other  HEENT:  [x]Normal   [ ]Dry mouth   [ ]ET Tube/Trach  [ ]Oral lesions  PULMONARY:   [x]Clear [ ]Tachypnea  [ ]Audible excessive secretions   [ ]Rhonchi        [ ]Right [ ]Left [ ]Bilateral  [ ]Crackles        [ ]Right [ ]Left [ ]Bilateral  [ ]Wheezing     [ ]Right [ ]Left [ ]Bilateral  [ ]Diminished BS [ ] Right [ ]Left [ ]Bilateral  CARDIOVASCULAR:    [x]Regular [ ]Irregular [ ]Tachy  [ ]Bobo [ ]Murmur [ ]Other  GASTROINTESTINAL:  [x]Soft  [ ]Distended   [x]+BS  [ ]Non tender [x ]Tender  [ ]PEG [ ]OGT/ NGT   Last BM:    GENITOURINARY:  [x]Normal [ ]Incontinent   [ ]Oliguria/Anuria   [ ]Shore  MUSCULOSKELETAL:   [ ]Normal   [x]Weakness  [ ]Bed/Wheelchair bound [ ]Edema  NEUROLOGIC:   [x]No focal deficits  [ ] Cognitive impairment  [ ] Dysphagia [ ]Dysarthria [ ] Paresis [ ]Other   SKIN:   [x]Normal  [ ]Rash   [ ]Pressure ulcer(s) [ ]y [ ]n present on admission      CRITICAL CARE:  [ ] Shock Present  [ ]Septic [ ]Cardiogenic [ ]Neurologic [ ]Hypovolemic  [ ]  Vasopressors [ ]  Inotropes   [ ]Respiratory failure present [ ]Mechanical ventilation [ ]Non-invasive ventilatory support [ ]High flow    [ ]Acute  [ ]Chronic [ ]Hypoxic  [ ]Hypercarbic [ ]Other  [ ]Other organ failure     LABS:                        10.7   11.05 )-----------( 356      ( 14 Sep 2022 07:00 )             34.7   09-14    135  |  95<L>  |  20  ----------------------------<  121<H>  4.0   |  29  |  0.62    Ca    9.6      14 Sep 2022 07:00    TPro  8.7<H>  /  Alb  4.1  /  TBili  0.7  /  DBili  x   /  AST  35  /  ALT  16  /  AlkPhos  263<H>  09-13  PT/INR - ( 13 Sep 2022 18:51 )   PT: 14.0 sec;   INR: 1.20 ratio         PTT - ( 13 Sep 2022 18:51 )  PTT:26.3 sec    Urinalysis Basic - ( 13 Sep 2022 22:45 )    Color: Yellow / Appearance: Clear / SG: >1.050 / pH: x  Gluc: x / Ketone: Negative  / Bili: Negative / Urobili: Negative   Blood: x / Protein: 30 mg/dL / Nitrite: Negative   Leuk Esterase: Negative / RBC: 27 /hpf / WBC 18 /HPF   Sq Epi: x / Non Sq Epi: 8 / Bacteria: Negative      RADIOLOGY & ADDITIONAL STUDIES:    < from: MR Head w/wo IV Cont (09.14.22 @ 19:13) >    ACC: [id number] EXAM:  MR BRAIN WAW IC                          PROCEDURE DATE:  09/14/2022          INTERPRETATION:  CLINICAL INDICATION: Brain metastases. Altered mental   status.    TECHNIQUE: Multi-planar multi-sequential MR imaging of the brain was   performed before and after the intravenous administration of contrast.   7.5 ml of Gadavist IV contrast was administered.    COMPARISON: CT head 9/13/2022.    FINDINGS:  Numerous enhancing intracranial lesions throughout the bilateral cerebral   hemispheres and left cerebellum with surrounding edema. The largest   lesions measure 2.8 x 2.2 x 2.8 cm in the right occipital lobe (4-3), 2.7   x 2.0 x 2.4 cm in the right parietal lobe (4-13) and 2.4 x 1.7 x 1.6 cm   and the left inferior cerebellum (3-12). The left inferior cerebellar   lesion partially effaces the caudal aspect of the fourth ventricle and   left foramen of Luschka.    There is partial effacement of the right lateral ventricle secondary to   surrounding vasogenic edema. However there is mild prominence of the left   lateral ventricle with FLAIR hyperintense signal along the frontal and   occipital horns. Mild hydrocephalus with transependymal CSF flow cannot   be ruled out.     No acute infarct or intracranial hemorrhage identified. No extra-axial   fluid collections. The skull base flow voids are present.    The visualized intraorbital contents are normal. The imaged portions of   the paranasal sinuses are clear. The mastoid air cells are clear. The   visualized osseous structures, soft tissues and partially visualized   parotid glands appear normal.    IMPRESSION:    -Extensive intracranial metastatic disease with surrounding vasogenic   edema. Largest lesions are detailed above.    -Left inferior cerebellar lesion partially effaces the caudal aspect of   the fourth ventricle and left foramen Luschka, with possible associated   mild hydrocephalus.    --- End of Report ---            NIKKY LAM MD; Attending Radiologist  This document has been electronically signed. Sep 15 2022 10:08AM    < end of copied text >      PROTEIN CALORIE MALNUTRITION PRESENT: [ ]mild [ ]moderate [ ]severe [ ]underweight [ ]morbid obesity  https://www.andeal.org/vault/2440/web/files/ONC/Table_Clinical%20Characteristics%20to%20Document%20Malnutrition-White%20JV%20et%20al%202012.pdf    Height (cm): 171.4 (09-13-22 @ 14:27)  Weight (kg): 55.3 (09-13-22 @ 14:27)  BMI (kg/m2): 18.8 (09-13-22 @ 14:27)    [ ]PPSV2 < or = to 30% [ ]significant weight loss  [ ]poor nutritional intake  [ ]anasarca[ ]Artificial Nutrition      Other REFERRALS:  [ ]Hospice  [ ]Child Life  [ ]Social Work  [ ]Case management [ ]Holistic Therapy     Goals of Care Document:

## 2022-09-15 NOTE — PROGRESS NOTE ADULT - SUBJECTIVE AND OBJECTIVE BOX
Patient seen and examined at bedside.    --Anticoagulation--    T(C): 36.8 (09-15-22 @ 04:29), Max: 37 (09-14-22 @ 11:54)  HR: 75 (09-15-22 @ 04:29) (74 - 82)  BP: 154/84 (09-15-22 @ 04:29) (137/82 - 166/69)  RR: 18 (09-15-22 @ 04:29) (16 - 18)  SpO2: 94% (09-15-22 @ 04:29) (94% - 98%)  Wt(kg): --    Exam: AOx3, PERRL, EOMI, no facial, no drift, LLE HF/HE 4+/5 PF 4/5 otherwise BAUTISTA 5/5, SILT

## 2022-09-15 NOTE — PHYSICAL THERAPY INITIAL EVALUATION ADULT - GAIT TRAINING, PT EVAL
Goal: Patient will ambulate 100 feet with RW and CGA in 2 weeks Goal: Patient will ambulate 100 feet with RW and CGA in 3 weeks

## 2022-09-15 NOTE — CONSULT NOTE ADULT - PROBLEM SELECTOR RECOMMENDATION 4
see separate GOC note  Briefly, pt and family awaiting oncology input regarding treatment options and prognosis. Discussed ACP including CPR/intubation. Pt and family to discuss further. Pt remains full code.  30 minute spent on ACP

## 2022-09-15 NOTE — CONSULT NOTE ADULT - SUBJECTIVE AND OBJECTIVE BOX
HPI:  75 yo F with h/o colon CA with  mets was on  oral chemo, but not for past  1 mo  Admitted 9/13/22 with AMS x4 weeks, worsening over the last few days.   She also had  right sided rib pain for weeks as well and sob on exertion.  As  per , pt slipped and fell in the bathroom while getting up from the toilet 3 weeks ago.  Unsure if she had any LOC. Has had unsteady gait since.   Had  +intermittent episodes of diarrhea  Denied nausea, vomiting, fever, chills, cough, chest pain, blood in stool, urinary complaints, headache.     PAST MEDICAL & SURGICAL HISTORY:  COPD (chronic obstructive pulmonary disease)  mild      Colon cancer      History of cholecystectomy  2000      Bone spur  Lt foot 5th digit 2014      H/O bilateral breast biopsy  2002      S/P tonsillectomy  childhood      Allergies:     Levaquin ([place])      FAMILY HISTORY:    Social History:    Medications:  dexAMETHasone     Tablet 4 milliGRAM(s) Oral every 6 hours  furosemide   Injectable 20 milliGRAM(s) IV Push daily  levETIRAcetam 500 milliGRAM(s) Oral two times a day  oxyCODONE    IR 10 milliGRAM(s) Oral every 6 hours PRN Moderate Pain (4 - 6)  oxyCODONE    IR 15 milliGRAM(s) Oral every 6 hours PRN Severe Pain (7 - 10)  pantoprazole    Tablet 40 milliGRAM(s) Oral before breakfast  senna 2 Tablet(s) Oral at bedtime    Labs:                        10.7   11.05 )-----------( 356      ( 14 Sep 2022 07:00 )             34.7     09-14    135  |  95<L>  |  20  ----------------------------<  121<H>  4.0   |  29  |  0.62    Ca    9.6      14 Sep 2022 07:00    TPro  8.7<H>  /  Alb  4.1  /  TBili  0.7  /  DBili  x   /  AST  35  /  ALT  16  /  AlkPhos  263<H>  09-13    Radiology:     < from: CT Head No Cont (09.13.22 @ 18:46) >  IMPRESSION:  Multiple hyperdense nodules are may represent hemorrhagic metastasis in a   patient with history of colon carcinoma. Further evaluation with contrast   enhanced brain MRI is recommended.  No acute intracranial hemorrhage.    < end of copied text >  < from: CT Abdomen and Pelvis w/ IV Cont (09.13.22 @ 18:48) >  IMPRESSION:  Segmental and subsegmental acute pulmonary arterial emboli as detailed   above. Presence of pulmonary embolism was discussed with Dr. Aguirre by Dr. Orta on September 13, 2022 at 8:03 PM.    Extensive metastatic disease as detailed above.    Small multiloculated bilateral pleural effusions.    Compression fracture deformity of the T4 vertebral body with some   retropulsion of the fractured vertebral body into the spinal canal.   Dedicated thoracic spine MRI can be performed for complete evaluation as   clinically warranted.      < end of copied text >  < from: MR Thoracic Spine w/wo IV Cont (09.14.22 @ 19:12) >  IMPRESSION:    Acute to subacute severe T4 compression fracture with bony retropulsion   resulting in mild spinal canal stenosis. No thoracic cord compression.    No evidence for thoracic spinal metastases.      < end of copied text >  < from: MR Head w/wo IV Cont (09.14.22 @ 19:13) >  FINDINGS:  Numerous enhancing intracranial lesions throughout the bilateral cerebral   hemispheres and left cerebellum with surrounding edema. The largest   lesions measure 2.8 x 2.2 x 2.8 cm in the right occipital lobe (4-3), 2.7   x 2.0 x 2.4 cm in the right parietal lobe (4-13) and 2.4 x 1.7 x 1.6 cm   and the left inferior cerebellum (3-12). The left inferior cerebellar   lesion partially effaces the caudal aspect of the fourth ventricle and   left foramen of Luschka.    There is partial effacement of the right lateral ventricle secondary to   surrounding vasogenic edema. However there is mild prominence of the left   lateral ventricle with FLAIR hyperintense signal along the frontal and   occipital horns. Mild hydrocephalus with transependymal CSF flow cannot   be ruled out.     No acute infarct or intracranial hemorrhage identified. No extra-axial   fluid collections. The skull base flow voids are present.    The visualized intraorbital contents are normal. The imaged portions of   the paranasal sinuses are clear. The mastoid air cells are clear. The   visualized osseous structures, soft tissues and partially visualized   parotid glands appear normal.    IMPRESSION:    -Extensive intracranial metastatic disease with surrounding vasogenic   edema. Largest lesions are detailed above.    -Left inferior cerebellar lesion partially effaces the caudal aspect of   the fourth ventricle and left foramen Luschka, with possible associated   mild hydrocephalus.      < end of copied text >  < from: VA Duplex Lower Ext Vein Scan, Bilat (09.15.22 @ 11:18) >  IMPRESSION:    Deep venous thrombosis involving the right common femoral and femoral   vein.  PA Babar Ting was performed at the end of the examination.    Patent left lower extremity veins.    < end of copied text >          ROS:  No pain, no fever  No lumps in neck or pain  No Sob or chest pain  No palpitations   No abdominal pain. No change in bowel habit. No blood in stools  No weakness in extremities  No leg swelling  Rest of comprehensive ROS was negative    Vital Signs Last 24 Hrs  T(C): 36.6 (15 Sep 2022 13:30), Max: 36.8 (14 Sep 2022 17:30)  T(F): 97.9 (15 Sep 2022 13:30), Max: 98.3 (14 Sep 2022 17:30)  HR: 73 (15 Sep 2022 13:30) (73 - 82)  BP: 149/77 (15 Sep 2022 13:30) (137/82 - 162/82)  BP(mean): --  RR: 18 (15 Sep 2022 13:30) (16 - 18)  SpO2: 97% (15 Sep 2022 13:30) (94% - 98%)    Parameters below as of 15 Sep 2022 13:30  Patient On (Oxygen Delivery Method): room air        Physical Exam:  Patient comfortable  AXOX3  Neck supple, no LN's  Chest: bilateral breath sounds, no wheeze or rales  CVS: regular heart rate without murmur  Abdomen: soft, BS+, no massess or organomegaly  CNS: no gross deficit  Musculoskeletal: Normal range of motion  Skin: no rash       HPI:  77 yo F with h/o colon CA with  mets was on  oral chemo, but not for past  1 mo  Admitted 9/13/22 with AMS x4 weeks, worsening over the last few days.   She also had  right sided rib pain for weeks as well and sob on exertion.  As  per , pt slipped and fell in the bathroom while getting up from the toilet 3 weeks ago.  Unsure if she had any LOC. Has had unsteady gait since.   Had  +intermittent episodes of diarrhea  Denied nausea, vomiting, fever, chills, cough, chest pain, blood in stool, urinary complaints, headache.     PAST MEDICAL & SURGICAL HISTORY:  COPD (chronic obstructive pulmonary disease)  mild      Colon cancer      History of cholecystectomy  2000      Bone spur  Lt foot 5th digit 2014      H/O bilateral breast biopsy  2002      S/P tonsillectomy  childhood      Allergies:     Levaquin ([place])      FAMILY HISTORY: no f/h of colon cancer    Social History: Non smoker    Medications:  dexAMETHasone     Tablet 4 milliGRAM(s) Oral every 6 hours  furosemide   Injectable 20 milliGRAM(s) IV Push daily  levETIRAcetam 500 milliGRAM(s) Oral two times a day  oxyCODONE    IR 10 milliGRAM(s) Oral every 6 hours PRN Moderate Pain (4 - 6)  oxyCODONE    IR 15 milliGRAM(s) Oral every 6 hours PRN Severe Pain (7 - 10)  pantoprazole    Tablet 40 milliGRAM(s) Oral before breakfast  senna 2 Tablet(s) Oral at bedtime    Labs:                        10.7   11.05 )-----------( 356      ( 14 Sep 2022 07:00 )             34.7     09-14    135  |  95<L>  |  20  ----------------------------<  121<H>  4.0   |  29  |  0.62    Ca    9.6      14 Sep 2022 07:00    TPro  8.7<H>  /  Alb  4.1  /  TBili  0.7  /  DBili  x   /  AST  35  /  ALT  16  /  AlkPhos  263<H>  09-13    Radiology:     < from: CT Head No Cont (09.13.22 @ 18:46) >  IMPRESSION:  Multiple hyperdense nodules are may represent hemorrhagic metastasis in a   patient with history of colon carcinoma. Further evaluation with contrast   enhanced brain MRI is recommended.  No acute intracranial hemorrhage.    < end of copied text >  < from: CT Abdomen and Pelvis w/ IV Cont (09.13.22 @ 18:48) >  IMPRESSION:  Segmental and subsegmental acute pulmonary arterial emboli as detailed   above. Presence of pulmonary embolism was discussed with Dr. Aguirre by Dr. Orta on September 13, 2022 at 8:03 PM.    Extensive metastatic disease as detailed above.    Small multiloculated bilateral pleural effusions.    Compression fracture deformity of the T4 vertebral body with some   retropulsion of the fractured vertebral body into the spinal canal.   Dedicated thoracic spine MRI can be performed for complete evaluation as   clinically warranted.      < end of copied text >  < from: MR Thoracic Spine w/wo IV Cont (09.14.22 @ 19:12) >  IMPRESSION:    Acute to subacute severe T4 compression fracture with bony retropulsion   resulting in mild spinal canal stenosis. No thoracic cord compression.    No evidence for thoracic spinal metastases.      < end of copied text >  < from: MR Head w/wo IV Cont (09.14.22 @ 19:13) >  FINDINGS:  Numerous enhancing intracranial lesions throughout the bilateral cerebral   hemispheres and left cerebellum with surrounding edema. The largest   lesions measure 2.8 x 2.2 x 2.8 cm in the right occipital lobe (4-3), 2.7   x 2.0 x 2.4 cm in the right parietal lobe (4-13) and 2.4 x 1.7 x 1.6 cm   and the left inferior cerebellum (3-12). The left inferior cerebellar   lesion partially effaces the caudal aspect of the fourth ventricle and   left foramen of Luschka.    There is partial effacement of the right lateral ventricle secondary to   surrounding vasogenic edema. However there is mild prominence of the left   lateral ventricle with FLAIR hyperintense signal along the frontal and   occipital horns. Mild hydrocephalus with transependymal CSF flow cannot   be ruled out.     No acute infarct or intracranial hemorrhage identified. No extra-axial   fluid collections. The skull base flow voids are present.    The visualized intraorbital contents are normal. The imaged portions of   the paranasal sinuses are clear. The mastoid air cells are clear. The   visualized osseous structures, soft tissues and partially visualized   parotid glands appear normal.    IMPRESSION:    -Extensive intracranial metastatic disease with surrounding vasogenic   edema. Largest lesions are detailed above.    -Left inferior cerebellar lesion partially effaces the caudal aspect of   the fourth ventricle and left foramen Luschka, with possible associated   mild hydrocephalus.      < end of copied text >  < from: VA Duplex Lower Ext Vein Scan, Bilat (09.15.22 @ 11:18) >  IMPRESSION:    Deep venous thrombosis involving the right common femoral and femoral   vein.  PA Babar Ting was performed at the end of the examination.    Patent left lower extremity veins.    < end of copied text >          ROS:  Patient getting pain meds for pain  No lumps in neck or pain  No Sob or chest pain  No palpitations   No abdominal pain. Had diarrhea earlier. No blood in stools  No weakness in extremities, general weakness  No leg swelling  Rest of comprehensive ROS was negative    Vital Signs Last 24 Hrs  T(C): 36.6 (15 Sep 2022 13:30), Max: 36.8 (14 Sep 2022 17:30)  T(F): 97.9 (15 Sep 2022 13:30), Max: 98.3 (14 Sep 2022 17:30)  HR: 73 (15 Sep 2022 13:30) (73 - 82)  BP: 149/77 (15 Sep 2022 13:30) (137/82 - 162/82)  BP(mean): --  RR: 18 (15 Sep 2022 13:30) (16 - 18)  SpO2: 97% (15 Sep 2022 13:30) (94% - 98%)    Parameters below as of 15 Sep 2022 13:30  Patient On (Oxygen Delivery Method): room air        Physical Exam:  Patient cachectic, able to give history  Neck supple, no LN's  Chest: bilateral breath sounds, no wheeze or rales  CVS: regular heart rate without murmur  Abdomen: soft, BS+, no massess or organomegaly  CNS: no gross deficit  Musculoskeletal: Normal range of motion  Skin: no rash

## 2022-09-15 NOTE — PHYSICAL THERAPY INITIAL EVALUATION ADULT - GAIT DEVIATIONS NOTED, PT EVAL
decreased altaf/increased time in double stance/decreased step length/decreased stride length/decreased weight-shifting ability

## 2022-09-15 NOTE — PROGRESS NOTE ADULT - SUBJECTIVE AND OBJECTIVE BOX
Received call from vascular department for abnormal study results. Patient has + Right common femoral and Right femoral vein DVT.  Order placed for interventional radiology consult for IVC filter. Case d/w PMD.

## 2022-09-15 NOTE — CONSULT NOTE ADULT - SUBJECTIVE AND OBJECTIVE BOX
Interventional Radiology    Evaluate for Procedure: IVC filter placement     HPI: 77 yo F with h/o colon CA with brain mets was on oral chemo, but not for past  1 month. Patient admitted on 9/13/22 with AMS x4 weeks, worsening over the last few days. She also had  right sided rib pain for weeks as well and sob on exertion.As  per , pt slipped and fell in the bathroom while getting up from the toilet 3 weeks ago. Unsure if she had any LOC. Has had unsteady gait since. Had  +intermittent episodes of diarrhea. Denied nausea, vomiting, fever, chills, cough, chest pain, blood in stool, urinary complaints, headache. Pt with PE/ DVT unable to be on anticoagulation due to cerebral hemorrhage. IR consulted for IVC filter placement.     Allergies: Levaquin ([place])    Medications (Abx/Cardiac/Anticoagulation/Blood Products)    furosemide   Injectable: 20 milliGRAM(s) IV Push (09-15 @ 05:08)    Data:  T(C): 36.6  HR: 73  BP: 149/77  RR: 18  SpO2: 97%    -WBC 11.05 / HgB 10.7 / Hct 34.7 / Plt 356  -Na 135 / Cl 95 / BUN 20 / Glucose 121  -K 4.0 / CO2 29 / Cr 0.62  -ALT -- / Alk Phos -- / T.Bili --  -INR 1.20 / PTT 26.3    Radiology: < from: [facility] Duplex Lower Ext Vein Scan, Bilat (09.15.22 @ 11:18) >  IMPRESSION:    Deep venous thrombosis involving the right common femoral and femoral   vein.  PA Babar Ting was performed at the end of the examination.    Patent left lower extremity veins.    < end of copied text >    Assessment/Plan: 77 yo F with h/o colon CA with brain mets was on oral chemo, but not for past  1 month. Patient admitted on 9/13/22 with AMS x4 weeks, worsening over the last few days. She also had  right sided rib pain for weeks as well and sob on exertion. As  per , pt slipped and fell in the bathroom while getting up from the toilet 3 weeks ago. Unsure if she had any LOC. Has had unsteady gait since. Had  +intermittent episodes of diarrhea. Denied nausea, vomiting, fever, chills, cough, chest pain, blood in stool, urinary complaints, headache. Pt with PE/ DVT unable to be on anticoagulation due to cerebral hemorrhage. IR consulted for IVC filter placement.     -Will plan for IVC filter placement w/ sedation tomorrow 9/16.   -Please place IR procedure order under Dr. Brice.   -Keep patient NPO after midnight tn  -STAT am labs  -Maintain COVID within 5 days  -Hold a/c x 24-48hrs  -Maintain active T&S  -Above d/w primary team  -Please contact IR at [phone] with any questions/ concerns regarding above.

## 2022-09-15 NOTE — PHYSICAL THERAPY INITIAL EVALUATION ADULT - IMPAIRMENTS FOUND, PT EVAL
aerobic capacity/endurance/gait, locomotion, and balance/muscle strength aerobic capacity/endurance/gait, locomotion, and balance/gross motor/muscle strength

## 2022-09-15 NOTE — PHARMACOTHERAPY INTERVENTION NOTE - COMMENTS
Medication reconciliation completed. Please refer to specifics in home medication list (outpatient medication review). Medications verified with patient, patient's daughter, patient's , and pharmacy.    Home Medications:  Caltrate 600 mg oral tablet  Centrum oral tablet: 1 tab(s) orally once a day   Medical Marijuana  meloxicam 7.5 mg oral tablet: 1 tab(s) orally once a day, As Needed   oxyCODONE 15 mg oral tablet: 1 tab(s) orally 5 times a day for 15 days  tiZANidine 4 mg oral tablet: 1  orally every 6 hours, As Needed   Vitamin B-12: orally once a day      Abiola Hardin, PharmD Candidate   Medication reconciliation completed. Please refer to specifics in home medication list (outpatient medication review). Medications verified with patient, patient's daughter, patient's , and pharmacy.    Home Medications:  Caltrate 600 mg oral tablet: orally once a day   Centrum oral tablet: 1 tab(s) orally once a day   meloxicam 7.5 mg oral tablet: 1 tab(s) orally once a day, As Needed   oxyCODONE 15 mg oral tablet: 1 tab(s) orally 5 times a day for 15 days   Symmetry Medical Marijuana 2.5 mg THC/2.5 mg CBD/0.5 mL:  0.25 - 0.5 milliliter(s) orally every 4 to 5 hours as needed            ID number: [id number]  tiZANidine 4 mg oral tablet: 1  orally every 6 hours, As Needed   Vitamin B-12: orally once a day     Abiola Hardin, PharmD Candidate

## 2022-09-15 NOTE — GOALS OF CARE CONVERSATION - ADVANCED CARE PLANNING - CONVERSATION DETAILS
MER consulted to assist in GOC and active pain/symptom management in the setting of patient with advanced illness. LCSW and Dr. Villagomez met with patient and patient's family today for family meeting.    Team first introduced selves and roles in patient care. Patient and family verbalized understanding and were receptive to visit today. Team next inquired into patient understanding of current medical status, and patient demonstrates understanding of status. Patient states that she is aware that she has cancer and notes a functional change immediately prior to admission including weakness and AMS. Patient states that she was previously receiving OP treatment but had stopped approximately three weeks ago due to the effects the treatment was having on patient's status.     Team validated this overview today, and discussed plans for Oncology visit today and input regarding patient's status and next steps in care. Team inquired into patient goals at this time whilst patient and family awaits oncology input, and patient notes feeling uncertain if she would wish to undergo further treatment due to the experience she had prior to stopping treatment. Patient and family note wishing to hear more from oncology about options prior to making any further decisions in care, and team validated this again today.    Team lastly inquired into advance care planning, and inquired what patient might find to be acceptable in the event her heart were to stop. Patient states that she is unsure what she would find to be acceptable, but states she will consider the question further and will discuss amongst her family. Team encouraged this discussion and discussed plans for follow up regarding the topic.    Team lastly discussed patient's symptom burden and regimen that is recommended to assist in care. Patient and family verbalized agreement and understanding, and note that patient symptom management is a paramount concern at present.     [facility] will continue to follow this case, and patient remains full code at this time.

## 2022-09-15 NOTE — PHYSICAL THERAPY INITIAL EVALUATION ADULT - PLANNED THERAPY INTERVENTIONS, PT EVAL
Goal: Patient will negotiate 12 steps with LOGAN davis with supervision in 2 weeks/gait training/transfer training Goal: Patient will negotiate 12 steps with LOGAN davis with supervision in 3 weeks/gait training/strengthening/transfer training

## 2022-09-15 NOTE — CHART NOTE - NSREFPHYEXREFERTOOTHER_GEN_ALL_CORE
Patient ID: Royer is a 25 year old male.  MRN: [id number]  Chief Complaint   Patient presents with   • Referral     Pt needs referral for physical therapy for his back pain     HISTORY OF PRESENT ILLNESS  Royer Pollock is a 26 y/o M with pmh of scoliosis who presents to establish care and for PT referral:    Moved from [place] recently  PMH of scoliosis  No medications  Surgeries: scoliosis surgery in [place] in 2012  Hospitalizations: none  Allergies: none  Fam hx: none    Social hx: no drinking,  recently  Sexually active with wife    Had recurrence of back pain in his R upper thoracic region  Presented to Ortho surgeon, Dr. Campos who ordered a CT scan and PT  Specifically stated specialized PT program called [facility] program--> available at [facility] [address] [place]  2 times per week x 6wks  Requests referral     He also reports his wife recently had an infection, unclear if urinary tract or STI  States his wife's doctor indicating that her parnter needed to be evaluated by a physician  He has no symptoms  Denies dysuria, urethral discharge, testicular/penile pain, urinary frequency, fevers, chills, abdominal pain, nausea/vomiting                                Review of Systems   Constitutional: Negative for chills and fever.   Respiratory: Negative for cough and shortness of breath.    Cardiovascular: Negative for chest pain.   Gastrointestinal: Negative for abdominal pain, constipation, diarrhea, nausea and vomiting.   Genitourinary: Negative for dysuria, flank pain, frequency, hematuria and urgency.   Musculoskeletal: Positive for back pain. Negative for neck pain.   Neurological: Negative for dizziness, tingling, weakness and headaches.        Patient's medications, allergies, problem list, past medical, surgical, social and family histories were reviewed and updated as appropriate.    ALLERGIES  ALLERGIES:  No Known Allergies    MEDICAL HISTORY  No past medical history on  Dispensing of TLSO file.  Patient Active Problem List   Diagnosis   • Adolescent idiopathic scoliosis of thoracolumbar region   • Screening examination for STD (sexually transmitted disease)   • Health maintenance examination     SURGICAL HISTORY  Past Surgical History:   Procedure Laterality Date   • Spine surgery      For scoliosis     FAMILY HISTORY  Family History   Problem Relation Age of Onset   • Patient is unaware of any medical problems Mother    • Patient is unaware of any medical problems Father      SOCIAL HISTORY  Social History     Socioeconomic History   • Marital status: Not on file     Spouse name: Not on file   • Number of children: Not on file   • Years of education: Not on file   • Highest education level: Not on file   Occupational History   • Not on file   Social Needs   • Financial resource strain: Not on file   • Food insecurity     Worry: Not on file     Inability: Not on file   • Transportation needs     Medical: Not on file     Non-medical: Not on file   Tobacco Use   • Smoking status: Never Smoker   • Smokeless tobacco: Never Used   Substance and Sexual Activity   • Alcohol use: Not Currently   • Drug use: Never   • Sexual activity: Not Currently   Lifestyle   • Physical activity     Days per week: Not on file     Minutes per session: Not on file   • Stress: Not on file   Relationships   • Social connections     Talks on phone: Not on file     Gets together: Not on file     Attends Church service: Not on file     Active member of club or organization: Not on file     Attends meetings of clubs or organizations: Not on file     Relationship status: Not on file   • Intimate partner violence     Fear of current or ex partner: Not on file     Emotionally abused: Not on file     Physically abused: Not on file     Forced sexual activity: Not on file   Other Topics Concern   • Not on file   Social History Narrative   • Not on file     CURRENT MEDICATIONS  No current outpatient medications on file.     No current  facility-administered medications for this visit.        OBJECTIVE  Vitals:    02/03/21 1358   BP: 130/62   BP Location: LUE - Left upper extremity   Patient Position: Sitting   Cuff Size: Regular   Pulse: 72   Resp: 18   Temp: 97.6 °F (36.4 °C)   TempSrc: Oral   SpO2: 98%   Weight: 80.6 kg (177 lb 11.1 oz)     Physical Exam  Constitutional:       General: He is not in acute distress.     Appearance: He is not ill-appearing or diaphoretic.   HENT:      Head: Normocephalic and atraumatic.      Nose: Nose normal.      Mouth/Throat:      Mouth: Mucous membranes are moist.   Eyes:      Extraocular Movements: Extraocular movements intact.      Conjunctiva/sclera: Conjunctivae normal.      Pupils: Pupils are equal, round, and reactive to light.   Neck:      Musculoskeletal: Normal range of motion.   Cardiovascular:      Rate and Rhythm: Normal rate and regular rhythm.      Pulses: Normal pulses.      Heart sounds: Normal heart sounds. No murmur.   Pulmonary:      Effort: Pulmonary effort is normal. No respiratory distress.      Breath sounds: Normal breath sounds. No wheezing.   Abdominal:      General: Abdomen is flat. Bowel sounds are normal. There is no distension.      Palpations: Abdomen is soft.      Tenderness: There is no abdominal tenderness. There is no guarding.   Musculoskeletal: Normal range of motion.      Right lower leg: No edema.      Left lower leg: No edema.   Skin:     General: Skin is warm.   Neurological:      General: No focal deficit present.      Mental Status: He is alert.   Psychiatric:         Mood and Affect: Mood normal.         Behavior: Behavior normal.         Thought Content: Thought content normal.         Judgment: Judgment normal.          ASSESSMENT AND PLAN  Problem List Items Addressed This Visit        Musculoskeletal    Adolescent idiopathic scoliosis of thoracolumbar region     Patient had surgery for scoliosis in [place] in 2012, about age 17  Has had some typical back pains  recur due to this  Saw ortho surgeon, Dr. Thompson who recommends PT  -Referral placed for specific PT program, [facility]              Other    Screening examination for STD (sexually transmitted disease)     Patient's wife had possible STD, her doctor request her partner get tested  Will assume STI  STI testing ordered today  Counseled to use condoms and/or avoid intercourse until infection ruled out  Will f/u on results         Relevant Orders    CHLAMYDIA/GONORRHEA BY NUCLEIC ACID AMPLIFICATION    HIV ANTIGEN/ANTIBODY SCREEN (Completed)    RPR (Completed)    CHLAMYDIA/GONORRHEA BY NUCLEIC ACID AMPLIFICATION (Completed)    Health maintenance examination     Patient without significant family history  Counseled on healthy diet and exercise habits  Patient without significant stressors  Follow up in 1 year for annual wellness exam           Other Visit Diagnoses     Scoliosis (and kyphoscoliosis), idiopathic    -  Primary    Relevant Orders    SERVICE TO PHYSICAL THERAPY          Health Maintenance Summary     DTaP/Tdap/Td Vaccine (1 - Tdap)  Postponed until 5/10/2021    Influenza Vaccine (1)  Postponed until 6/30/2021    Varicella Vaccine (1 of 2 - 2-dose childhood series)  Postponed until 12/1/2021    HPV Vaccine (1 - Male 2-dose series)  Postponed until 1/12/2022    Depression Screening (Yearly)  Next due on 2/3/2022    Hepatitis B Vaccine   Aged Out    Meningococcal Vaccine   Aged Out    Pneumococcal Vaccine 0-64   Aged Out          Schedule follow up: Return if symptoms worsen or fail to improve, for Or in 1 yr for annual wellness visit.    Inessa Trent DO

## 2022-09-15 NOTE — CONSULT NOTE ADULT - ASSESSMENT
77 y/o F with a PMH of colon CA with mets recently completed chemo tx no longer on oral chemo x 1 mo p/w AMS x4 weeks, worsening over the last few days. Has also been c/o right sided rib pain for weeks as well. CT head concerning for hemorrhagic metastasis. Palliative consulted for GOC and symptom management.

## 2022-09-15 NOTE — PHYSICAL THERAPY INITIAL EVALUATION ADULT - TRANSFER TRAINING, PT EVAL
Goal: Patient will perform sit to stand with RW and CGA in 2 weeks Goal: Patient will perform sit to stand with RW and CGA in 3 weeks

## 2022-09-15 NOTE — CONSULT NOTE ADULT - PROBLEM SELECTOR RECOMMENDATION 2
ISTOP reviewed #[id number]. Pt with rx on 9/8 for oxycodone IR 15mg 75 tabs for 5 days.   -Tylenol 650mg q6h PRN mild pain   -oxycodone 10mg q4-6h PRN moderate pain   -oxycodone 15mg q4-6h PRN severe pain   Please order above with holding parameters for sedation of RR<10  bowel regimen

## 2022-09-15 NOTE — PHYSICAL THERAPY INITIAL EVALUATION ADULT - NSPTDISCHREC_GEN_A_CORE
TBD pending ambulation IF pt returns home, will require assist with ALL functional mobility and HomePT services. DME: RW, transport wheelchair, 3:1 commode, shower chair./[facility]

## 2022-09-15 NOTE — CONSULT NOTE ADULT - ASSESSMENT
77 yo F with h/o colon CA with  mets, stated she was dx in 2021 and had radiation from Nov to Dec 2021. She then had infusional chemo till march 2022 and oral pills and then only oral pills. No chemo since siddhartha July 2022.  Admitted 9/13/22 with AMS x4 weeks, worsening over the last few days.   She also had  right sided rib pain for weeks as well and sob on exertion.  As  per , pt slipped and fell in the bathroom while getting up from the toilet 3 weeks ago.  Unsure if she had any LOC. Has had unsteady gait since.   Had  +intermittent episodes of diarrhea  She has been found to have brain mets, extensive metastatic disease, No cord compression on MRI spine, pulmonary embolism and DVT.   IVC filter is planned.  She is on Decadron, keppra and pain meds.  Prognosis is extremely poor.   ? benefit of brain RT.  RT consult to be called. I left message for patient's  to get more details and his input.  Extensive brain mets makes her have very poor prognosis in addition to high volume metastatic disease with prior treatments and poor PS.  Palliative care is appropriate.   Hospice will be appropriate after getting chance to discuss with patients daughter/.

## 2022-09-15 NOTE — PROGRESS NOTE ADULT - SUBJECTIVE AND OBJECTIVE BOX
afberile  REVIEW OF SYSTEMS:  GEN: no fever,    no chills  RESP: no SOB,   no cough  CVS: no chest pain,   no palpitations  GI: no abdominal pain,   no nausea,   no vomiting,   no constipation,   no diarrhea  : no dysuria,   no frequency  NEURO: no headache,   no dizziness  PSYCH: no depression,   not anxious  Derm : no rash    MEDICATIONS  (STANDING):  dexAMETHasone     Tablet 4 milliGRAM(s) Oral every 6 hours  dextrose 5% + sodium chloride 0.45%. 1000 milliLiter(s) (50 mL/Hr) IV Continuous <Continuous>  furosemide   Injectable 20 milliGRAM(s) IV Push daily  levETIRAcetam 500 milliGRAM(s) Oral two times a day  pantoprazole    Tablet 40 milliGRAM(s) Oral before breakfast  senna 2 Tablet(s) Oral at bedtime    MEDICATIONS  (PRN):  oxycodone    5 mG/acetaminophen 325 mG 1 Tablet(s) Oral every 12 hours PRN Severe Pain (7 - 10)      Vital Signs Last 24 Hrs  T(C): 36.8 (15 Sep 2022 04:29), Max: 37 (14 Sep 2022 11:54)  T(F): 98.2 (15 Sep 2022 04:29), Max: 98.6 (14 Sep 2022 11:54)  HR: 77 (15 Sep 2022 08:46) (74 - 82)  BP: 154/79 (15 Sep 2022 08:46) (137/82 - 166/69)  BP(mean): --  RR: 18 (15 Sep 2022 04:29) (16 - 18)  SpO2: 95% (15 Sep 2022 08:46) (94% - 98%)    Parameters below as of 15 Sep 2022 08:46  Patient On (Oxygen Delivery Method): room air      CAPILLARY BLOOD GLUCOSE        I&O's Summary    14 Sep 2022 07:01  -  15 Sep 2022 07:00  --------------------------------------------------------  IN: 500 mL / OUT: 300 mL / NET: 200 mL        PHYSICAL EXAM:  HEAD:  Atraumatic, Normocephalic  NECK: Supple, No   JVD  CHEST/LUNG:   no     rales,     no,    rhonchi  HEART: Regular rate and rhythm;         murmur  ABDOMEN: Soft, Nontender, ;   EXTREMITIES:        edema  NEUROLOGY:  alert    LABS:                        10.7   11.05 )-----------( 356      ( 14 Sep 2022 07:00 )             34.7     09-14    135  |  95<L>  |  20  ----------------------------<  121<H>  4.0   |  29  |  0.62    Ca    9.6      14 Sep 2022 07:00    TPro  8.7<H>  /  Alb  4.1  /  TBili  0.7  /  DBili  x   /  AST  35  /  ALT  16  /  AlkPhos  263<H>  09-13    PT/INR - ( 13 Sep 2022 18:51 )   PT: 14.0 sec;   INR: 1.20 ratio         PTT - ( 13 Sep 2022 18:51 )  PTT:26.3 sec      Urinalysis Basic - ( 13 Sep 2022 22:45 )    Color: Yellow / Appearance: Clear / SG: >1.050 / pH: x  Gluc: x / Ketone: Negative  / Bili: Negative / Urobili: Negative   Blood: x / Protein: 30 mg/dL / Nitrite: Negative   Leuk Esterase: Negative / RBC: 27 /hpf / WBC 18 /HPF   Sq Epi: x / Non Sq Epi: 8 / Bacteria: Negative      Lactate, Blood: 1.3 mmol/L (09-13 @ 21:41)      09-13 @ 18:25  3.5  23              Consultant(s) Notes Reviewed:      Care Discussed with Consultants/Other Providers:

## 2022-09-15 NOTE — PROGRESS NOTE ADULT - ASSESSMENT
77 yo F        h/o colon CA with  mets      was on   chemo ,  no longer on oral chemo x  past  1 mo     p/w AMS x4 weeks, worsening over the last few days.     had also been c/o right sided rib pain for weeks as well.sob  on exertion x days.     pmd  d r brand   sent pt in for further evaluation as pt presented to her with AMS, worsening weakness and lethargy.     Per , pt slipped and fell in the bathroom while getting up from the toilet 3 weeks ago.    Unsure if she had any LOC. Has had unsteady gait since.      +intermittent episodes of diarrhea     Denies nausea, vomiting, fever, chills, cough, chest pain, blood in stool, urinary complaints, headache.     admitted   with  ams.  from cerebral   mets  with bleed   h/o  metastatic ca colon, oncologist dr jose enrique ignacio   N/S  eval, no intervention   pt has no focal weakness    pt had  sob at  home,  none  at present /  card eval   CT  chest angio. ,Pe, b/l pl effusions, hepatic mets /        T4 comp fx, with bony retropulsion,   TLSO  brace  per  N/S    unable to  a/c pt , given cerebral bleed/ family  aware of risks   check doppler  legs   wbc  noted. from malignancy  afebrile   dvt  ppx/ pas, fall risk/ PT eval   oncology d  rohri/     await   leg  dopplers/   on Decadron  for  h'gic mets          aware of  poor prognosis  states, pt is full code/  awiat  further  family  decision     spoke  with  ,  has  meeting   today  at 11  am  with palliative care       rad< from: CT Head No Cont (09.13.22 @ 18:46) >  RESSION:  Multiple hyperdense nodules are may represent hemorrhagic metastasis in a   patient with history of colon carcinoma. Further evaluation with contrast   enhanced brain MRI is recommended.  No acute intracranial hemorrhage.  --- End of Report ---       RAD< from: CT Abdomen and Pelvis w/ IV Cont (09.13.22 @ 18:48) >  IMPRESSION:  Segmental and subsegmental acute pulmonary arterial emboli as detailed   above. Presence of pulmonary embolism was discussed with Dr. Aguirre by Dr. Orta on September 13, 2022 at 8:03 PM.  Extensive metastatic disease as detailed above.  Small multiloculated bilateral pleural effusions.  Compression fracture deformity of the T4 vertebral body with some   retropulsion of the fractured vertebral body into the spinal canal.   Dedicated thoracic spine MRI can be performed for complete evaluation as   clinically warranted.  --- End of Report ---

## 2022-09-15 NOTE — PROGRESS NOTE ADULT - ASSESSMENT
76F hx stage 3 colon ca w/ known mets to lung. Last chemo reg finished 1mo ago. Has had AMS/confusion x 1mo, worsening over last few days. Has also had unsteady gait x 1mo. CTH: mult hyperdense lesions c/f mets (?heme). CT CAP showing acute PE, pleural effusions, ext mets disease, T4 VB comp fx w/ retropulsion. Exam: AOx3, PERRL, EOMI, no facial, no drift, LLE HF/HE 4+/5 PF 4/5 otherwise BAUTISTA 5/5, SILT    -No acute nsgy intervention  -Adm med  -MRI brain with multiple brain mets: recommend Rad onc for radiosurgery  -TLSO brace when OOB for comfort  -Dex 4q6  -Keppra 500bid   76F hx stage 3 colon ca w/ known mets to lung. Last chemo reg finished 1mo ago. Has had AMS/confusion x 1mo, worsening over last few days. Has also had unsteady gait x 1mo. CTH: mult hyperdense lesions c/f mets (?heme). CT CAP showing acute PE, pleural effusions, ext mets disease, T4 VB comp fx w/ retropulsion. Exam: AOx3, PERRL, EOMI, no facial, no drift, LLE HF/HE 4+/5 PF 4/5 otherwise BAUTISTA 5/5, SILT    -No acute nsgy intervention  -Adm med  -MRI brain with multiple brain mets: recommend Rad onc for radiosurgery. Please have patient FU with Dr. Nathan after DC  -TLSO brace when OOB for comfort  -Dex 4q6  -Keppra 500bid

## 2022-09-15 NOTE — CONSULT NOTE ADULT - PROBLEM SELECTOR RECOMMENDATION 9
PPS 40%  unsteady gait for approx 1 month, mainly in bed   Pt requires assistance with ADLs  continue PT/supportive care

## 2022-09-16 NOTE — PROGRESS NOTE ADULT - SUBJECTIVE AND OBJECTIVE BOX
SUBJECTIVE AND OBJECTIVE:  pt resting in bed reporting continious pain  Indication for Geriatrics and Palliative Care Services/INTERVAL HPI:  following up for pain    OVERNIGHT EVENTS: no acute events overnight.  Pt required 3 doses of Oxy 10mg  is 24 hrs    DNR on chart:  Allergies    Levaquin ([place])    Intolerances    MEDICATIONS  (STANDING):  dexAMETHasone     Tablet 4 milliGRAM(s) Oral every 6 hours  furosemide   Injectable 20 milliGRAM(s) IV Push daily  levETIRAcetam 500 milliGRAM(s) Oral two times a day  metoprolol succinate ER 25 milliGRAM(s) Oral daily  pantoprazole    Tablet 40 milliGRAM(s) Oral before breakfast  senna 2 Tablet(s) Oral at bedtime    MEDICATIONS  (PRN):  oxyCODONE    IR 15 milliGRAM(s) Oral every 6 hours PRN Moderate Pain (4 - 6)  oxyCODONE    IR 20 milliGRAM(s) Oral every 6 hours PRN Severe Pain (7 - 10)      ITEMS UNCHECKED ARE NOT PRESENT    PRESENT SYMPTOMS: [ ]Unable to self-report - see [ ] CPOT [ ] PAINADS [ ] RDOS  Source if other than patient:  [ ]Family   [ ]Team     Pain:  [ x]yes [ ]no  QOL impact -   Location -        RLQ, RUQ and epigastrum             Aggravating factors -  moving  Quality - sharp/cramping  Radiation - thoughout R side of abd  Timing- constant with intermittent waves of increased pain  Severity (0-10 scale):  Minimal acceptable level (0-10 scale):     CPOT:    https://www.[facility].org/getattachment/[email or web address]/Critical-Care-Pain-Observation-Tool-(CPOT)    PAIN AD Score:	  http://geriatrictoolkit.[email or web address]/painad.pdf (Ctrl + left click to view)    Dyspnea:            none               [ ]Mild [ ]Moderate [ ]Severe    RDOS:  0 to 2  minimal or no respiratory distress   3  mild distress  4 to 6 moderate distress  >7 severe distress  https://homecareinformation.net/handouts/hen/Respiratory_Distress_Observation_Scale.pdf (Ctrl +  left click to view)     Anxiety:                             [ ]Mild [ ]Moderate [ ]Severe  Fatigue:                             [x ]Mild [ ]Moderate [ ]Severe  Nausea:                             [ ]Mild [ ]Moderate [ ]Severe  Loss of appetite:              [ ]Mild [x ]Moderate [ ]Severe  Constipation:                    [ ]Mild [ ]Moderate [ ]Severe    PCSSQ[Palliative Care Spiritual Screening Question]   Severity (0-10):  Score of 4 or > indicate consideration of Chaplaincy referral.  Chaplaincy Referral: [ ] yes [ ] refused [ ] following [ ] Deferred     Caregiver [place]? : [ ] yes [x ] no [ ] Deferred [ ] Declined             Social work referral [ ] Patient & Family Centered Care Referral [ ]     Anticipatory Grief present?:  [ ] yes [x ] no  [ ] Deferred                  Social work referral [ ] Chaplaincy Referral[ ]      Other Symptoms:  [x ]All other review of systems negative     Palliative Performance Status Version 2:   40-50      %      http://npcrc.org/files/news/palliative_performance_scale_ppsv2.pdf  PHYSICAL EXAM:  Vital Signs Last 24 Hrs  T(C): 36.5 (16 Sep 2022 04:41), Max: 36.7 (15 Sep 2022 21:07)  T(F): 97.7 (16 Sep 2022 04:41), Max: 98 (15 Sep 2022 21:07)  HR: 88 (16 Sep 2022 04:41) (73 - 88)  BP: 164/77 (16 Sep 2022 04:41) (147/79 - 171/83)  BP(mean): --  RR: 17 (16 Sep 2022 04:41) (17 - 18)  SpO2: 94% (16 Sep 2022 04:41) (94% - 97%)    Parameters below as of 16 Sep 2022 04:41  Patient On (Oxygen Delivery Method): room air     I&O's Summary    15 Sep 2022 07:01  -  16 Sep 2022 07:00  --------------------------------------------------------  IN: 170 mL / OUT: 475 mL / NET: -305 mL       GENERAL: [ ]Cachexia    [x ]Alert  [x ]Oriented x   [ ]Lethargic  [ ]Unarousable  [x ]Verbal  [ ]Non-Verbal  Behavioral:   [ ]Anxiety  [ ]Delirium [ ]Agitation [ ]Other  HEENT:  [x ]Normal   [ ]Dry mouth   [ ]ET Tube/Trach  [ ]Oral lesions  PULMONARY:   [x ]Clear [ ]Tachypnea  [ ]Audible excessive secretions   [ ]Rhonchi        [ ]Right [ ]Left [ ]Bilateral  [ ]Crackles        [ ]Right [ ]Left [ ]Bilateral  [ ]Wheezing     [ ]Right [ ]Left [ ]Bilateral  [ ]Diminished BS [ ] Right [ ]Left [ ]Bilateral  CARDIOVASCULAR:    x[ ]Regular [ ]Irregular [ ]Tachy  [ ]Boob [ ]Murmur [ ]Other  GASTROINTESTINAL:  x[ ]Soft  [ ]Distended   [x ]+BS  [ ]Non tender [ x]Tender  [ ]Other [ ]PEG [ ]OGT/ NGT   Last BM:   GENITOURINARY:  [x ]Normal [ ]Incontinent   [ ]Oliguria/Anuria   [ ]Shore  MUSCULOSKELETAL:   [ x]Normal   [ ]Weakness  [ ]Bed/Wheelchair bound [ ]Edema  NEUROLOGIC:   [ x]No focal deficits  [ ] Cognitive impairment  [ ] Dysphagia [ ]Dysarthria [ ] Paresis [ ]Other   SKIN:   [x ]Normal  [ ]Rash  [ ]Other  [ ]Pressure ulcer(s) [ ]y [ ]n present on admission    CRITICAL CARE:  [ ]Shock Present  [ ]Septic [ ]Cardiogenic [ ]Neurologic [ ]Hypovolemic  [ ]Vasopressors [ ]Inotropes  [ ]Respiratory failure present [ ]Mechanical Ventilation [ ]Non-invasive ventilatory support [ ]High-Flow   [ ]Acute  [ ]Chronic [ ]Hypoxic  [ ]Hypercarbic [ ]Other  [ ]Other organ failure     LABS:                        11.5   13.34 )-----------( 454      ( 16 Sep 2022 04:54 )             36.6   09-16    133<L>  |  95<L>  |  18  ----------------------------<  127<H>  4.0   |  27  |  0.48<L>    Ca    9.8      16 Sep 2022 04:54  Phos  3.0     09-16  Mg     2.0     09-16    PT/INR - ( 16 Sep 2022 04:54 )   PT: 14.2 sec;   INR: 1.22 ratio         PTT - ( 16 Sep 2022 04:54 )  PTT:25.9 sec      RADIOLOGY & ADDITIONAL STUDIES:    < from: MR Head w/wo IV Cont (09.14.22 @ 19:13) >  ACC: [id number] EXAM:  MR BRAIN WAW IC                          PROCEDURE DATE:  09/14/2022          INTERPRETATION:  CLINICAL INDICATION: Brain metastases. Altered mental   status.    TECHNIQUE: Multi-planar multi-sequential MR imaging of the brain was   performed before and after the intravenous administration of contrast.   7.5 ml of Gadavist IV contrast was administered.    COMPARISON: CT head 9/13/2022.    FINDINGS:  Numerous enhancing intracranial lesions throughout the bilateral cerebral   hemispheres and left cerebellum with surrounding edema. The largest   lesions measure 2.8 x 2.2 x 2.8 cm in the right occipital lobe (4-3), 2.7   x 2.0 x 2.4 cm in the right parietal lobe (4-13) and 2.4 x 1.7 x 1.6 cm   and the left inferior cerebellum (3-12). The left inferior cerebellar   lesion partially effaces the caudal aspect of the fourth ventricle and   left foramen of Luschka.    There is partial effacement of the right lateral ventricle secondary to   surrounding vasogenic edema. However there is mild prominence of the left   lateral ventricle with FLAIR hyperintense signal along the frontal and   occipital horns. Mild hydrocephalus with transependymal CSF flow cannot   be ruled out.     No acute infarct or intracranial hemorrhage identified. No extra-axial   fluid collections. The skull base flow voids are present.    The visualized intraorbital contents are normal. The imaged portions of   the paranasal sinuses are clear. The mastoid air cells are clear. The   visualized osseous structures, soft tissues and partially visualized   parotid glands appear normal.    IMPRESSION:    -Extensive intracranial metastatic disease with surrounding vasogenic   edema. Largest lesions are detailed above.    -Left inferior cerebellar lesion partially effaces the caudal aspect of   the fourth ventricle and left foramen Luschka, with possible associated   mild hydrocephalus.    --- End of Report ---            NIKKY LAM MD; Attending Radiologist  This document has been electronically signed. Sep 15 2022 10:08AM    < end of copied text >      Protein Calorie Malnutrition Present: [ ]mild [ ]moderate [ ]severe [ ]underweight [ ]morbid obesity  https://www.andeal.org/vault/2440/web/files/ONC/Table_Clinical%20Characteristics%20to%20Document%20Malnutrition-White%20JV%20et%20al%542088.pdf    Height (cm): 171.4 (09-13-22 @ 14:27)  Weight (kg): 55.3 (09-13-22 @ 14:27)  BMI (kg/m2): 18.8 (09-13-22 @ 14:27)    [ ]PPSV2 < or = 30%  [ ]significant weight loss [ ]poor nutritional intake [ ]anasarca[ ]Artificial Nutrition    Other REFERRALS:  [ ]Hospice  [ ]Child Life  [ ]Social Work  [ ]Case management [ ]Holistic Therapy     Goals of Care Document:EVA Knapp (09-15-22 @ 15:35)  Goals of Care Conversation:   Participants:  · Participants  Patient; Family; Staff  · Spouse  Mayank  · Child(cabrera)  daughter Alissa, son Cuong  · Relative  daughter in law Karrie  · Provider  Dr. Villagomez of Palliative  ·   Navin Royal LCSW    Advance Directives:  · Does patient have Advance Directive  No  · Does Patient Have a Surrogate  No  · Does the Patient have a Court Appointed Guardian (9900-B)  No  · Caregiver:  declines    Conversation Discussion:  · Conversation  Diagnosis; Prognosis; MOLST Discussed; Treatment Options  · Conversation Details  GAP consulted to assist in GOC and active pain/symptom management in the setting of patient with advanced illness. [place] and Dr. Villagomez met with patient and patient's family today for family meeting.    Team first introduced selves and roles in patient care. Patient and family verbalized understanding and were receptive to visit today. Team next inquired into patient understanding of current medical status, and patient demonstrates understanding of status. Patient states that she is aware that she has cancer and notes a functional change immediately prior to admission including weakness and AMS. Patient states that she was previously receiving OP treatment but had stopped approximately three weeks ago due to the effects the treatment was having on patient's status.     Team validated this overview today, and discussed plans for Oncology visit today and input regarding patient's status and next steps in care. Team inquired into patient goals at this time whilst patient and family awaits oncology input, and patient notes feeling uncertain if she would wish to undergo further treatment due to the experience she had prior to stopping treatment. Patient and family note wishing to hear more from oncology about options prior to making any further decisions in care, and team validated this again today.    Team lastly inquired into advance care planning, and inquired what patient might find to be acceptable in the event her heart were to stop. Patient states that she is unsure what she would find to be acceptable, but states she will consider the question further and will discuss amongst her family. Team encouraged this discussion and discussed plans for follow up regarding the topic.    Team lastly discussed patient's symptom burden and regimen that is recommended to assist in care. Patient and family verbalized agreement and understanding, and note that patient symptom management is a paramount concern at present.     GAP will continue to follow this case, and patient remains full code at this time.    What Matters Most To Patient and Family:  · What matters most to patient and family  Time spent with family, pain/symptom management    Personal Advance Directives Treatment Guidelines:   Treatment Guidelines:  · Decision Maker  Patient    Location of Discussion:   Time Spent on Advance Care Planning:  I spent 30 (in minutes) on advance care planning services with the patient.  This time is separate and distinct from any other care management services provided on this date.    Location of Discussion:  · Location of discussion  Face to face      Electronic Signatures:  Navin Royal ([facility])  (Signed 15-Sep-2022 15:48)  	Authored: Goals of Care Conversation, Personal Advance Directives Treatment Guidelines, Location of Discussion  Lyn Villagomez)  (Signed 16-Sep-2022 11:20)  	Authored: Goals of Care Conversation      Last Updated: 16-Sep-2022 11:20 by Lny Villagomez)

## 2022-09-16 NOTE — PROGRESS NOTE ADULT - SUBJECTIVE AND OBJECTIVE BOX
afebrile  REVIEW OF SYSTEMS:  GEN: no fever,    no chills  RESP: no SOB,   no cough  CVS: no chest pain,   no palpitations  GI: no abdominal pain,   no nausea,   no vomiting,   no constipation,   no diarrhea  : no dysuria,   no frequency  NEURO: no headache,   no dizziness  PSYCH: no depression,   not anxious  Derm : no rash    MEDICATIONS  (STANDING):  dexAMETHasone     Tablet 4 milliGRAM(s) Oral every 6 hours  furosemide   Injectable 20 milliGRAM(s) IV Push daily  levETIRAcetam 500 milliGRAM(s) Oral two times a day  pantoprazole    Tablet 40 milliGRAM(s) Oral before breakfast  senna 2 Tablet(s) Oral at bedtime    MEDICATIONS  (PRN):  oxyCODONE    IR 10 milliGRAM(s) Oral every 6 hours PRN Moderate Pain (4 - 6)  oxyCODONE    IR 15 milliGRAM(s) Oral every 6 hours PRN Severe Pain (7 - 10)      Vital Signs Last 24 Hrs  T(C): 36.5 (16 Sep 2022 04:41), Max: 36.7 (15 Sep 2022 21:07)  T(F): 97.7 (16 Sep 2022 04:41), Max: 98 (15 Sep 2022 21:07)  HR: 88 (16 Sep 2022 04:41) (73 - 88)  BP: 164/77 (16 Sep 2022 04:41) (147/79 - 171/83)  BP(mean): --  RR: 17 (16 Sep 2022 04:41) (17 - 18)  SpO2: 94% (16 Sep 2022 04:41) (94% - 97%)    Parameters below as of 16 Sep 2022 04:41  Patient On (Oxygen Delivery Method): room air      CAPILLARY BLOOD GLUCOSE        I&O's Summary    15 Sep 2022 07:01  -  16 Sep 2022 07:00  --------------------------------------------------------  IN: 170 mL / OUT: 475 mL / NET: -305 mL        PHYSICAL EXAM:  HEAD:  Atraumatic, Normocephalic  NECK: Supple, No   JVD  CHEST/LUNG:   no     rales,     no,    rhonchi  HEART: Regular rate and rhythm;         murmur  ABDOMEN: Soft, Nontender, ;   EXTREMITIES:   no     edema  NEUROLOGY:  alert    LABS:                        11.5   13.34 )-----------( 454      ( 16 Sep 2022 04:54 )             36.6     09-16    133<L>  |  95<L>  |  18  ----------------------------<  127<H>  4.0   |  27  |  0.48<L>    Ca    9.8      16 Sep 2022 04:54  Phos  3.0     09-16  Mg     2.0     09-16      PT/INR - ( 16 Sep 2022 04:54 )   PT: 14.2 sec;   INR: 1.22 ratio         PTT - ( 16 Sep 2022 04:54 )  PTT:25.9 sec                        Consultant(s) Notes Reviewed:      Care Discussed with Consultants/Other Providers:

## 2022-09-16 NOTE — PROGRESS NOTE ADULT - ASSESSMENT
75 yo F        h/o colon CA with  mets      was on   chemo ,  no longer on oral chemo x  past  1 mo     p/w AMS x4 weeks, worsening over the last few days.     had also been c/o right sided rib pain for weeks as well.sob  on exertion x days.     pmd  d r brand   sent pt in for further evaluation as pt presented to her with AMS, worsening weakness and lethargy.     Per , pt slipped and fell in the bathroom while getting up from the toilet 3 weeks ago.    Unsure if she had any LOC. Has had unsteady gait since.      +intermittent episodes of diarrhea     Denies nausea, vomiting, fever, chills, cough, chest pain, blood in stool, urinary complaints, headache.     admitted   with  ams.  from cerebral   mets  with bleed.  able to  converse   h/o  metastatic ca colon, oncologist dr jose enrique ignacio   N/S  eval, no intervention   pt has no focal weakness    pt had  sob at  home,  none  at present /  card eval   CT  chest angio. ,Pe, b/l pl effusions, hepatic mets /        T4 comp fx, with bony retropulsion,   TLSO  brace  per  N/S     PE/ R  leg  SVT,   unable to  a/c pt , given cerebral bleed/ family  aware of risks   wbc  noted. from malignancy  afebrile   dvt  ppx/ pas, fall risk/ PT eval   oncology dr bullock     on Decadron  for  h'gic mets        aware of  poor prognosis  states, pt is full code/  awiat  further  family  decision    family   had  meeting  with palliative care,  remains  full code   await  IVC filte r by  IR  rx  plan pe r d r ohri  is  palliative  in [facility] by [facility]. RT  to  brain / agree  with  oncology  that,  pt  ideal  candidate  for  comfort  care/  hospice       rad< from: CT Head No Cont (09.13.22 @ 18:46) >  RESSION:  Multiple hyperdense nodules are may represent hemorrhagic metastasis in a   patient with history of colon carcinoma. Further evaluation with contrast   enhanced brain MRI is recommended.  No acute intracranial hemorrhage.  --- End of Report ---       RAD< from: CT Abdomen and Pelvis w/ IV Cont (09.13.22 @ 18:48) >  IMPRESSION:  Segmental and subsegmental acute pulmonary arterial emboli as detailed   above. Presence of pulmonary embolism was discussed with Dr. Aguirre by Dr. Orta on September 13, 2022 at 8:03 PM.  Extensive metastatic disease as detailed above.  Small multiloculated bilateral pleural effusions.  Compression fracture deformity of the T4 vertebral body with some   retropulsion of the fractured vertebral body into the spinal canal.   Dedicated thoracic spine MRI can be performed for complete evaluation as   clinically warranted.  --- End of Report ---                   77 yo F        h/o colon CA with  mets      was on   chemo ,  no longer on oral chemo x  past  1 mo     p/w AMS x4 weeks, worsening over the last few days.     had also been c/o right sided rib pain for weeks as well.sob  on exertion x days.     pmd  d r brand   sent pt in for further evaluation as pt presented to her with AMS, worsening weakness and lethargy.     Per , pt slipped and fell in the bathroom while getting up from the toilet 3 weeks ago.    Unsure if she had any LOC. Has had unsteady gait since.      +intermittent episodes of diarrhea     Denies nausea, vomiting, fever, chills, cough, chest pain, blood in stool, urinary complaints, headache.     admitted   with  ams.  from cerebral   mets  with bleed.  able to  converse   h/o  metastatic ca colon, oncologist dr jose enrique ignacio   N/S  eval, no intervention   pt has no focal weakness    pt had  sob at  home,  none  at present /  card eval   CT  chest angio. ,Pe, b/l pl effusions, hepatic mets /        T4 comp fx, with bony retropulsion,   TLSO  brace  per  N/S     PE/ R  leg  SVT,   unable to  a/c pt , given cerebral bleed/ family  aware of risks   wbc  noted. from malignancy  afebrile   dvt  ppx/ pas, fall risk/ PT eval   oncology dr bullock     on Decadron  for  h'gic mets        aware of  poor prognosis  states, pt is full code/  awiat  further  family  decision    family   had  meeting  with palliative care, on 9/ 15,  remains  full code   await  IVC filter  by  IR  rx  plan per  dr bullock  is  palliative  in nature.. RT  to  brain / agree  with  oncology  that,  pt  ideal  candidate  for  comfort  care/  hospice  spoke with  daughter  today/ pt remains  full  code         rad< from: CT Head No Cont (09.13.22 @ 18:46) >  RESSION:  Multiple hyperdense nodules are may represent hemorrhagic metastasis in a   patient with history of colon carcinoma. Further evaluation with contrast   enhanced brain MRI is recommended.  No acute intracranial hemorrhage.  --- End of Report ---       RAD< from: CT Abdomen and Pelvis w/ IV Cont (09.13.22 @ 18:48) >  IMPRESSION:  Segmental and subsegmental acute pulmonary arterial emboli as detailed   above. Presence of pulmonary embolism was discussed with Dr. Aguirre by Dr. Orta on September 13, 2022 at 8:03 PM.  Extensive metastatic disease as detailed above.  Small multiloculated bilateral pleural effusions.  Compression fracture deformity of the T4 vertebral body with some   retropulsion of the fractured vertebral body into the spinal canal.   Dedicated thoracic spine MRI can be performed for complete evaluation as   clinically warranted.  --- End of Report ---

## 2022-09-16 NOTE — PROGRESS NOTE ADULT - ASSESSMENT
75 y/o F with a PMH of colon CA with mets recently completed chemo tx no longer on oral chemo x 1 mo p/w AMS x4 weeks, worsening over the last few days. Has also been c/o right sided rib pain for weeks as well. CT head concerning for hemorrhagic metastasis. Palliative consulted for GOC and symptom management

## 2022-09-16 NOTE — PROGRESS NOTE ADULT - SUBJECTIVE AND OBJECTIVE BOX
77 yo F with h/o colon CA with  mets was on  oral chemo, but not for past  1 mo  Admitted 9/13/22 with AMS x4 weeks, worsening over the last few days.   She also had  right sided rib pain for weeks as well and sob on exertion.  As  per , pt slipped and fell in the bathroom while getting up from the toilet 3 weeks ago.  Unsure if she had any LOC. Has had unsteady gait since.   Had  +intermittent episodes of diarrhea  Denied nausea, vomiting, fever, chills, cough, chest pain, blood in stool, urinary complaints, headache.     PAST MEDICAL & SURGICAL HISTORY:  COPD (chronic obstructive pulmonary disease)  mild      Colon cancer      History of cholecystectomy  2000      Bone spur  Lt foot 5th digit 2014      H/O bilateral breast biopsy  2002      S/P tonsillectomy  childhood        Allergies    Levaquin ([place])    Intolerances      Social History:    Medications:  dexAMETHasone     Tablet 4 milliGRAM(s) Oral every 6 hours  furosemide   Injectable 20 milliGRAM(s) IV Push daily  levETIRAcetam 500 milliGRAM(s) Oral two times a day  metoprolol succinate ER 25 milliGRAM(s) Oral daily  oxyCODONE    IR 15 milliGRAM(s) Oral every 6 hours PRN Moderate Pain (4 - 6)  oxyCODONE    IR 20 milliGRAM(s) Oral every 6 hours PRN Severe Pain (7 - 10)  pantoprazole    Tablet 40 milliGRAM(s) Oral before breakfast  senna 2 Tablet(s) Oral at bedtime    Labs:  CBC Full  -  ( 16 Sep 2022 04:54 )  WBC Count : 13.34 K/uL  RBC Count : 4.14 M/uL  Hemoglobin : 11.5 g/dL  Hematocrit : 36.6 %  Platelet Count - Automated : 454 K/uL  Mean Cell Volume : 88.4 fl  Mean Cell Hemoglobin : 27.8 pg  Mean Cell Hemoglobin Concentration : 31.4 gm/dL  Auto Neutrophil # : x  Auto Lymphocyte # : x  Auto Monocyte # : x  Auto Eosinophil # : x  Auto Basophil # : x  Auto Neutrophil % : x  Auto Lymphocyte % : x  Auto Monocyte % : x  Auto Eosinophil % : x  Auto Basophil % : x    09-16    133<L>  |  95<L>  |  18  ----------------------------<  127<H>  4.0   |  27  |  0.48<L>    Ca    9.8      16 Sep 2022 04:54  Phos  3.0     09-16  Mg     2.0     09-16        Radiology:             ROS:  Patient comfortable without distress  No SOB or chest pain  No palpitation  No abdominal pain, diarrhaea or constipation  No weakness of extremities  No skin changes or swelling of legs  Rest of the comprehensive ROS was negative  Vital Signs Last 24 Hrs  T(C): 36.6 (16 Sep 2022 16:54), Max: 36.7 (15 Sep 2022 21:07)  T(F): 97.9 (16 Sep 2022 16:54), Max: 98 (15 Sep 2022 21:07)  HR: 77 (16 Sep 2022 16:54) (77 - 94)  BP: 158/77 (16 Sep 2022 16:54) (139/84 - 171/83)  BP(mean): --  RR: 20 (16 Sep 2022 16:54) (17 - 20)  SpO2: 94% (16 Sep 2022 16:54) (94% - 97%)    Parameters below as of 16 Sep 2022 16:35  Patient On (Oxygen Delivery Method): room air        Physical exam:  Patient alert and oriented  No distress, cachectic  CVS: S1, S2   Chest: bilateral breath sound without rales  Abdomen: soft, not tender, no organomegaly or masses  CNS: No focal neuro deficit  Musculoskeletal:  Normal range of motion  Skin: No rash    Assessment and Plan:Height (cm): 170.2 (09-16 @ 16:54)  Weight (kg): 55.3 (09-16 @ 16:54)  BMI (kg/m2): 19.1 (09-16 @ 16:54)  BSA (m2): 1.64 (09-16 @ 16:54)

## 2022-09-16 NOTE — PROGRESS NOTE ADULT - SUBJECTIVE AND OBJECTIVE BOX
CARDIOLOGY     PROGRESS  NOTE   ________________________________________________    CHIEF COMPLAINT:Patient is a 76y old  Female who presents with a chief complaint of ams/ sob (16 Sep 2022 07:24)  no complain.  	  REVIEW OF SYSTEMS:  CONSTITUTIONAL: No fever, weight loss, or fatigue  EYES: No eye pain, visual disturbances, or discharge  ENT:  No difficulty hearing, tinnitus, vertigo; No sinus or throat pain  NECK: No pain or stiffness  RESPIRATORY: No cough, wheezing, chills or hemoptysis; No Shortness of Breath  CARDIOVASCULAR: No chest pain, palpitations, passing out, dizziness, or leg swelling  GASTROINTESTINAL: No abdominal or epigastric pain. No nausea, vomiting, or hematemesis; No diarrhea or constipation. No melena or hematochezia.  GENITOURINARY: No dysuria, frequency, hematuria, or incontinence  NEUROLOGICAL: No headaches, memory loss, loss of strength, numbness, or tremors  SKIN: No itching, burning, rashes, or lesions   LYMPH Nodes: No enlarged glands  ENDOCRINE: No heat or cold intolerance; No hair loss  MUSCULOSKELETAL: No joint pain or swelling; No muscle, back, or extremity pain  PSYCHIATRIC: No depression, anxiety, mood swings, or difficulty sleeping  HEME/LYMPH: No easy bruising, or bleeding gums  ALLERGY AND IMMUNOLOGIC: No hives or eczema	    [ ] All others negative	  [x ] Unable to obtain    PHYSICAL EXAM:  T(C): 36.5 (09-16-22 @ 04:41), Max: 36.7 (09-15-22 @ 21:07)  HR: 88 (09-16-22 @ 04:41) (73 - 88)  BP: 164/77 (09-16-22 @ 04:41) (147/79 - 171/83)  RR: 17 (09-16-22 @ 04:41) (17 - 18)  SpO2: 94% (09-16-22 @ 04:41) (94% - 97%)  Wt(kg): --  I&O's Summary    15 Sep 2022 07:01  -  16 Sep 2022 07:00  --------------------------------------------------------  IN: 170 mL / OUT: 475 mL / NET: -305 mL        Appearance: Normal	  HEENT:   Normal oral mucosa, PERRL, EOMI	  Lymphatic: No lymphadenopathy  Cardiovascular: Normal S1 S2, No JVD, + murmurs, No edema  Respiratory: rhonchi  Gastrointestinal:  Soft, Non-tender, + BS	  Skin: No rashes, No ecchymoses, No cyanosis	  Neurologic: Non-focal  Extremities: Normal range of motion, No clubbing, cyanosis or edema  Vascular: Peripheral pulses palpable 2+ bilaterally    MEDICATIONS  (STANDING):  dexAMETHasone     Tablet 4 milliGRAM(s) Oral every 6 hours  furosemide   Injectable 20 milliGRAM(s) IV Push daily  levETIRAcetam 500 milliGRAM(s) Oral two times a day  metoprolol succinate ER 25 milliGRAM(s) Oral daily  pantoprazole    Tablet 40 milliGRAM(s) Oral before breakfast  senna 2 Tablet(s) Oral at bedtime      TELEMETRY: 	    ECG:  	  RADIOLOGY:  OTHER: 	  	  LABS:	 	    CARDIAC MARKERS:                                11.5   13.34 )-----------( 454      ( 16 Sep 2022 04:54 )             36.6     09-16    133<L>  |  95<L>  |  18  ----------------------------<  127<H>  4.0   |  27  |  0.48<L>    Ca    9.8      16 Sep 2022 04:54  Phos  3.0     09-16  Mg     2.0     09-16      proBNP: Serum Pro-Brain Natriuretic Peptide: 2287 pg/mL (09-13 @ 18:51)    Lipid Profile:   HgA1c:   TSH:   PT/INR - ( 16 Sep 2022 04:54 )   PT: 14.2 sec;   INR: 1.22 ratio         PTT - ( 16 Sep 2022 04:54 )  PTT:25.9 sec  ·  Recommendation: ISTOP reviewed #[id number]. Pt with rx on 9/8 for oxycodone IR 15mg 75 tabs for 5 days.   -Tylenol 650mg q6h PRN mild pain   -oxycodone 10mg q4-6h PRN moderate pain   -oxycodone 15mg q4-6h PRN severe pain   Please order above with holding parameters for sedation of RR<10  bowel regimen.    Assessment and plan  ---------------------------  75 yo F h/o colon CA with  mets was on   chemo ,  no longer on oral chemo x  past  1 mo p/w AMS x4 weeks, worsening over the last few days.     had also been c/o right sided rib pain for weeks as well.sob  on exertion x days.     pmd  d r brand   sent pt in for further evaluation as pt presented to her with AMS, worsening weakness and lethargy.     Per , pt slipped and fell in the bathroom while getting up from the toilet 3 weeks ago.    Unsure if she had any LOC. Has had unsteady gait since.      +intermittent episodes of diarrhea     Denies nausea, vomiting, fever, chills, cough, chest pain, blood in stool, urinary complaints, headache.   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  AC contraindicated  pt needs IVC filter any way despite doppler  check le venous doppler awaiting  echo in er noted with normal ef and bl pleural effusion with increase pro bnp  dc ivf  palliative care noted

## 2022-09-16 NOTE — PROGRESS NOTE ADULT - ASSESSMENT
75 yo F with h/o colon CA with  mets, stated she was dx in 2021 and had radiation from Nov to Dec 2021. She then had infusional chemo till march 2022 and oral pills and then only oral pills. No chemo since siddhartha July/August 2022.  Admitted 9/13/22 with AMS x4 weeks, worsening over the last few days.   She also had  right sided rib pain for weeks as well and sob on exertion.  As  per , pt slipped and fell in the bathroom while getting up from the toilet 3 weeks ago.  Unsure if she had any LOC. Has had unsteady gait since.   Had  +intermittent episodes of diarrhea  She has been found to have brain mets, extensive metastatic disease, No cord compression on MRI spine, pulmonary embolism and DVT.   IVC filter done 9/16/22.  She is on Decadron, keppra and pain meds.  Prognosis is extremely poor.   Extensive brain mets makes her have very poor prognosis in addition to high volume metastatic disease with prior treatments and poor PS.  I had extensive discussion with patient's daughter. She confirmed the history of rectal cancer given by her mother. She stated mets in ling and liver were found after she received RT with chemo pills.  She stated NS told her about radiosurgery for brain mets  Told her that decision will come from radiation oncology.  Discussed disease burden and prognosis with her. Told her about not curative nature of disease and extensive brain mets further impacting prognosis.  Discussed that  DNR and DNI should be discussed and considered.

## 2022-09-16 NOTE — PROGRESS NOTE ADULT - PROBLEM SELECTOR PLAN 1
pt reports that pain meds she is currently taking are not improving her pain. Discussed increasing Oxy by 5 mg...  ordered-  Oxy 15 mg po q6h prn mod pain  Oxy 20 mg po q6h prn severe pain    Palliative care team will f/u for results of change in dosing

## 2022-09-16 NOTE — CONSULT NOTE ADULT - SUBJECTIVE AND OBJECTIVE BOX
HPI:  75 yo F        h/o colon CA with  mets      was on   chemo ,  no longer on oral chemo x  past  1 mo     p/w AMS x4 weeks, worsening over the last few days.     had also been c/o right sided rib pain for weeks as well.sob  on exertion x days.     pmd  d r brand   sent pt in for further evaluation as pt presented to her with AMS, worsening weakness and lethargy.     Per , pt slipped and fell in the bathroom while getting up from the toilet 3 weeks ago.    Unsure if she had any LOC. Has had unsteady gait since.      +intermittent episodes of diarrhea     Denies nausea, vomiting, fever, chills, cough, chest pain, blood in stool, urinary complaints, headache. (13 Sep 2022 20:18)    PAST MEDICAL & SURGICAL HISTORY:  COPD (chronic obstructive pulmonary disease)  mild      Colon cancer      History of cholecystectomy  2000      Bone spur  Lt foot 5th digit 2014      H/O bilateral breast biopsy  2002      S/P tonsillectomy  childhood        Allergies    Levaquin (Flushing)    Intolerances      Social History:    Medications:  dexAMETHasone     Tablet 4 milliGRAM(s) Oral every 6 hours  furosemide   Injectable 20 milliGRAM(s) IV Push daily  levETIRAcetam 500 milliGRAM(s) Oral two times a day  metoprolol succinate ER 25 milliGRAM(s) Oral daily  oxyCODONE    IR 15 milliGRAM(s) Oral every 6 hours PRN Moderate Pain (4 - 6)  oxyCODONE    IR 20 milliGRAM(s) Oral every 6 hours PRN Severe Pain (7 - 10)  pantoprazole    Tablet 40 milliGRAM(s) Oral before breakfast  senna 2 Tablet(s) Oral at bedtime    Labs:  CBC Full  -  ( 16 Sep 2022 04:54 )  WBC Count : 13.34 K/uL  RBC Count : 4.14 M/uL  Hemoglobin : 11.5 g/dL  Hematocrit : 36.6 %  Platelet Count - Automated : 454 K/uL  Mean Cell Volume : 88.4 fl  Mean Cell Hemoglobin : 27.8 pg  Mean Cell Hemoglobin Concentration : 31.4 gm/dL  Auto Neutrophil # : x  Auto Lymphocyte # : x  Auto Monocyte # : x  Auto Eosinophil # : x  Auto Basophil # : x  Auto Neutrophil % : x  Auto Lymphocyte % : x  Auto Monocyte % : x  Auto Eosinophil % : x  Auto Basophil % : x    09-16    133<L>  |  95<L>  |  18  ----------------------------<  127<H>  4.0   |  27  |  0.48<L>    Ca    9.8      16 Sep 2022 04:54  Phos  3.0     09-16  Mg     2.0     09-16        Radiology:     < from: MR Head w/wo IV Cont (09.14.22 @ 19:13) >  MPRESSION:    -Extensive intracranial metastatic disease with surrounding vasogenic   edema. Largest lesions are detailed above.    -Left inferior cerebellar lesion partially effaces the caudal aspect of   the fourth ventricle and left foramen Luschka, with possible associated   mild hydrocephalus.      < end of copied text >          ROS:  Patient comfortable without distress  No SOB or chest pain  No palpitation  No abdominal pain, diarrhaea or constipation  No weakness of extremities  No skin changes or swelling of legs  Rest of the comprehensive ROS was negative  Vital Signs Last 24 Hrs  T(C): 36.6 (16 Sep 2022 16:54), Max: 36.7 (15 Sep 2022 21:07)  T(F): 97.9 (16 Sep 2022 16:54), Max: 98 (15 Sep 2022 21:07)  HR: 77 (16 Sep 2022 16:54) (77 - 94)  BP: 158/77 (16 Sep 2022 16:54) (139/84 - 171/83)  BP(mean): --  RR: 20 (16 Sep 2022 16:54) (17 - 20)  SpO2: 94% (16 Sep 2022 16:54) (94% - 97%)    Parameters below as of 16 Sep 2022 16:35  Patient On (Oxygen Delivery Method): room air        Physical exam:  Patient alert and oriented  No distress  CVS: S1, S2 regular or murmur  Chest: bilateral breath sound without rales  Abdomen: soft, not tender, no organomegaly or masses  CNS: No focal neuro deficit  Musculoskeletal:  Normal range of motion  Skin: No rash    Assessment and Plan:Height (cm): 170.2 (09-16 @ 16:54)  Weight (kg): 55.3 (09-16 @ 16:54)  BMI (kg/m2): 19.1 (09-16 @ 16:54)  BSA (m2): 1.64 (09-16 @ 16:54)

## 2022-09-16 NOTE — PRE PROCEDURE NOTE - PRE PROCEDURE EVALUATION
Interventional Radiology    HPI: 77 yo F with h/o colon CA with brain mets was on oral chemo, but not for past  1 month. Patient admitted on 9/13/22 with AMS x4 weeks, worsening over the last few days. She also had  right sided rib pain for weeks as well and sob on exertion.As  per , pt slipped and fell in the bathroom while getting up from the toilet 3 weeks ago. Unsure if she had any LOC. Has had unsteady gait since. Had  +intermittent episodes of diarrhea. Denied nausea, vomiting, fever, chills, cough, chest pain, blood in stool, urinary complaints, headache. Pt with PE/ DVT unable to be on anticoagulation due to cerebral hemorrhage. Here for IVC filter placement.        Allergies: Levaquin (Flushing)    Medications (Abx/Cardiac/Anticoagulation/Blood Products)  furosemide   Injectable: 20 milliGRAM(s) IV Push (09-16 @ 05:27)  metoprolol succinate ER: 25 milliGRAM(s) Oral (09-16 @ 11:52)    Data:  170.2  55.3  T(C): 36.6  HR: 77  BP: 158/77  RR: 20  SpO2: 94%    Altered mental status    Handoff    MEWS Score    COPD (chronic obstructive pulmonary disease)    Colon cancer    AMS (altered mental status)    Functional quadriplegia    Neoplasm related pain    Primary colon cancer with metastasis to other site    ACP (advance care planning)    Palliative care encounter    History of cholecystectomy    Bone spur    H/O bilateral breast biopsy    S/P tonsillectomy    CHANGE IN MENTAL STATUS 1 JINNY    Metastatic cancer to brain    [email or web address]        Exam  General: No acute distress  Chest: Non labored breathing    -WBC 13.34 / HgB 11.5 / Hct 36.6 / Plt 454  -Na 133 / Cl 95 / BUN 18 / Glucose 127  -K 4.0 / CO2 27 / Cr 0.48  -ALT -- / Alk Phos -- / T.Bili --  -INR1.22     Plan: 76y Female presents for IVC filter placement.   -Risks/Benefits/alternatives explained with the healthcare proxy and witnessed informed consent obtained.

## 2022-09-17 NOTE — PROGRESS NOTE ADULT - SUBJECTIVE AND OBJECTIVE BOX
CARDIOLOGY     PROGRESS  NOTE   ________________________________________________    CHIEF COMPLAINT:Patient is a 76y old  Female who presents with a chief complaint of ams/ sob (17 Sep 2022 07:10)  doing better.  	  REVIEW OF SYSTEMS:  CONSTITUTIONAL: No fever, weight loss, or fatigue  EYES: No eye pain, visual disturbances, or discharge  ENT:  No difficulty hearing, tinnitus, vertigo; No sinus or throat pain  NECK: No pain or stiffness  RESPIRATORY: No cough, wheezing, chills or hemoptysis; No Shortness of Breath  CARDIOVASCULAR: No chest pain, palpitations, passing out, dizziness, or leg swelling  GASTROINTESTINAL: No abdominal or epigastric pain. No nausea, vomiting, or hematemesis; No diarrhea or constipation. No melena or hematochezia.  GENITOURINARY: No dysuria, frequency, hematuria, or incontinence  NEUROLOGICAL: No headaches, memory loss, loss of strength, numbness, or tremors  SKIN: No itching, burning, rashes, or lesions   LYMPH Nodes: No enlarged glands  ENDOCRINE: No heat or cold intolerance; No hair loss  MUSCULOSKELETAL: No joint pain or swelling; No muscle, back, or extremity pain  PSYCHIATRIC: No depression, anxiety, mood swings, or difficulty sleeping  HEME/LYMPH: No easy bruising, or bleeding gums  ALLERGY AND IMMUNOLOGIC: No hives or eczema	    [ ] All others negative	  [ x] Unable to obtain    PHYSICAL EXAM:  T(C): 36.6 (09-17-22 @ 04:24), Max: 36.7 (09-16-22 @ 13:24)  HR: 74 (09-17-22 @ 04:24) (74 - 94)  BP: 162/81 (09-17-22 @ 04:24) (139/84 - 165/87)  RR: 18 (09-17-22 @ 04:24) (18 - 20)  SpO2: 92% (09-17-22 @ 04:24) (92% - 97%)  Wt(kg): --  I&O's Summary    16 Sep 2022 07:01  -  17 Sep 2022 07:00  --------------------------------------------------------  IN: 0 mL / OUT: 0 mL / NET: 0 mL        Appearance: Normal	  HEENT:   Normal oral mucosa, PERRL, EOMI	  Lymphatic: No lymphadenopathy  Cardiovascular: Normal S1 S2, No JVD, N+ murmurs, No edema  Respiratory: Lungs clear to auscultation	  Gastrointestinal:  Soft, Non-tender, + BS	  Skin: No rashes, No ecchymoses, No cyanosis	  Neurologic: Non-focal  Extremities: Normal range of motion, No clubbing, cyanosis or edema  Vascular: Peripheral pulses palpable 2+ bilaterally    MEDICATIONS  (STANDING):  dexAMETHasone     Tablet 4 milliGRAM(s) Oral every 6 hours  furosemide   Injectable 20 milliGRAM(s) IV Push daily  levETIRAcetam 500 milliGRAM(s) Oral two times a day  metoprolol succinate ER 25 milliGRAM(s) Oral daily  pantoprazole    Tablet 40 milliGRAM(s) Oral before breakfast  senna 2 Tablet(s) Oral at bedtime      TELEMETRY: 	    ECG:  	  RADIOLOGY:  OTHER: 	  	  LABS:	 	    CARDIAC MARKERS:                                13.1   16.04 )-----------( 504      ( 17 Sep 2022 07:40 )             41.5     09-16    133<L>  |  95<L>  |  18  ----------------------------<  127<H>  4.0   |  27  |  0.48<L>    Ca    9.8      16 Sep 2022 04:54  Phos  3.0     09-16  Mg     2.0     09-16      proBNP: Serum Pro-Brain Natriuretic Peptide: 2287 pg/mL (09-13 @ 18:51)    Lipid Profile:   HgA1c:   TSH:   PT/INR - ( 16 Sep 2022 04:54 )   PT: 14.2 sec;   INR: 1.22 ratio         PTT - ( 16 Sep 2022 04:54 )  PTT:25.9 sec      Assessment and plan  ---------------------------  75 yo F h/o colon CA with  mets was on   chemo ,  no longer on oral chemo x  past  1 mo p/w AMS x4 weeks, worsening over the last few days.     had also been c/o right sided rib pain for weeks as well.sob  on exertion x days.     pmd  d r brand   sent pt in for further evaluation as pt presented to her with AMS, worsening weakness and lethargy.     Per , pt slipped and fell in the bathroom while getting up from the toilet 3 weeks ago.    Unsure if she had any LOC. Has had unsteady gait since.      +intermittent episodes of diarrhea     Denies nausea, vomiting, fever, chills, cough, chest pain, blood in stool, urinary complaints, headache.   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  AC contraindicated  pt needs IVC filter any way despite doppler  check le venous doppler awaiting  echo in er noted with normal ef and bl pleural effusion with increase pro bnp  dc ivf  palliative care/ onc  noted  increase beta blocker for bp control

## 2022-09-17 NOTE — PROGRESS NOTE ADULT - SUBJECTIVE AND OBJECTIVE BOX
afberile  REVIEW OF SYSTEMS:  GEN: no fever,    no chills  RESP: no SOB,   no cough  CVS: no chest pain,   no palpitations  GI: no abdominal pain,   no nausea,   no vomiting,   no constipation,   no diarrhea  : no dysuria,   no frequency  NEURO: no headache,   no dizziness  PSYCH: no depression,   not anxious  Derm : no rash    MEDICATIONS  (STANDING):  dexAMETHasone     Tablet 4 milliGRAM(s) Oral every 6 hours  furosemide   Injectable 20 milliGRAM(s) IV Push daily  levETIRAcetam 500 milliGRAM(s) Oral two times a day  metoprolol succinate ER 25 milliGRAM(s) Oral daily  pantoprazole    Tablet 40 milliGRAM(s) Oral before breakfast  senna 2 Tablet(s) Oral at bedtime    MEDICATIONS  (PRN):  oxyCODONE    IR 15 milliGRAM(s) Oral every 6 hours PRN Moderate Pain (4 - 6)  oxyCODONE    IR 20 milliGRAM(s) Oral every 6 hours PRN Severe Pain (7 - 10)      Vital Signs Last 24 Hrs  T(C): 36.6 (17 Sep 2022 04:24), Max: 36.7 (16 Sep 2022 13:24)  T(F): 97.8 (17 Sep 2022 04:24), Max: 98 (16 Sep 2022 13:24)  HR: 74 (17 Sep 2022 04:24) (74 - 94)  BP: 162/81 (17 Sep 2022 04:24) (139/84 - 165/87)  BP(mean): --  RR: 18 (17 Sep 2022 04:24) (18 - 20)  SpO2: 92% (17 Sep 2022 04:24) (92% - 97%)    Parameters below as of 17 Sep 2022 04:24  Patient On (Oxygen Delivery Method): room air      CAPILLARY BLOOD GLUCOSE        I&O's Summary    16 Sep 2022 07:01  -  17 Sep 2022 07:00  --------------------------------------------------------  IN: 0 mL / OUT: 0 mL / NET: 0 mL        PHYSICAL EXAM:  HEAD:  Atraumatic, Normocephalic  NECK: Supple, No   JVD  CHEST/LUNG:   no     rales,     no,    rhonchi  HEART: Regular rate and rhythm;         murmur  ABDOMEN: Soft, Nontender, ;   EXTREMITIES:     no   edema  NEUROLOGY:  alert    LABS:                        11.5   13.34 )-----------( 454      ( 16 Sep 2022 04:54 )             36.6     09-16    133<L>  |  95<L>  |  18  ----------------------------<  127<H>  4.0   |  27  |  0.48<L>    Ca    9.8      16 Sep 2022 04:54  Phos  3.0     09-16  Mg     2.0     09-16      PT/INR - ( 16 Sep 2022 04:54 )   PT: 14.2 sec;   INR: 1.22 ratio         PTT - ( 16 Sep 2022 04:54 )  PTT:25.9 sec                        Consultant(s) Notes Reviewed:      Care Discussed with Consultants/Other Providers:

## 2022-09-17 NOTE — PROGRESS NOTE ADULT - ASSESSMENT
75 yo F        h/o colon CA with  mets      was on   chemo ,  no longer on oral chemo x  past  1 mo     p/w AMS x4 weeks, worsening over the last few days.     had also been c/o right sided rib pain for weeks as well.sob  on exertion x days.     pmd  d r brand   sent pt in for further evaluation as pt presented to her with AMS, worsening weakness and lethargy.     Per , pt slipped and fell in the bathroom while getting up from the toilet 3 weeks ago.    Unsure if she had any LOC. Has had unsteady gait since.      +intermittent episodes of diarrhea     Denies nausea, vomiting, fever, chills, cough, chest pain, blood in stool, urinary complaints, headache.     admitted   with  ams.  from cerebral   mets  with bleed.  able to  converse   h/o  metastatic ca colon, oncologist dr jose enrique ignacio   N/S  eval, no intervention,   pt has no focal weakness   CT  chest angio. ,Pe, b/l pl effusions, hepatic mets /        T4 comp fx, with bony retropulsion,   TLSO  brace  per  N/S     pt  with +   PE/ R  leg  DVT,   unable to  a/c pt , given cerebral bleed/ family  aware of risks   wbc  noted. from malignancy  afebrile , , fall risk/ PT eval   oncology dr bullock  ,  on Decadron  for  h'gic mets, seen by  N/S         aware of  poor prognosis.  family   had  meeting  with palliative care, on 9/ 15,  remains  full code    s/p   IVC filter  by  IR  rx  plan per  dr bullock  is  palliative  in nature,  RT  to  brain / agree  with  oncology  that,  pt  ideal  candidate  for  comfort  care/  hospice  spoke with  daughter ,  pt remains  full  code/  awiating family  input         rad< from: CT Head No Cont (09.13.22 @ 18:46) >  RESSION:  Multiple hyperdense nodules are may represent hemorrhagic metastasis in a   patient with history of colon carcinoma. Further evaluation with contrast   enhanced brain MRI is recommended.  No acute intracranial hemorrhage.  --- End of Report ---       RAD< from: CT Abdomen and Pelvis w/ IV Cont (09.13.22 @ 18:48) >  IMPRESSION:  Segmental and subsegmental acute pulmonary arterial emboli as detailed   above. Presence of pulmonary embolism was discussed with Dr. Aguirre by Dr. Orta on September 13, 2022 at 8:03 PM.  Extensive metastatic disease as detailed above.  Small multiloculated bilateral pleural effusions.  Compression fracture deformity of the T4 vertebral body with some   retropulsion of the fractured vertebral body into the spinal canal.   Dedicated thoracic spine MRI can be performed for complete evaluation as   clinically warranted.  --- End of Report ---

## 2022-09-18 NOTE — PROGRESS NOTE ADULT - SUBJECTIVE AND OBJECTIVE BOX
75 yo F with h/o colon CA with  mets was on  oral chemo, but not for past  1 mo  Admitted 9/13/22 with AMS x4 weeks, worsening over the last few days.   She also had  right sided rib pain for weeks as well and sob on exertion.  As  per , pt slipped and fell in the bathroom while getting up from the toilet 3 weeks ago.  Unsure if she had any LOC. Has had unsteady gait since.   Had  +intermittent episodes of diarrhea  Denied nausea, vomiting, fever, chills, cough, chest pain, blood in stool, urinary complaints, headache.       PAST MEDICAL & SURGICAL HISTORY:  COPD (chronic obstructive pulmonary disease)  mild      Colon cancer      History of cholecystectomy  2000      Bone spur  Lt foot 5th digit 2014      H/O bilateral breast biopsy  2002      S/P tonsillectomy  childhood        Allergies    Levaquin ([place])    Intolerances      Social History:    Medications:  dexAMETHasone     Tablet 4 milliGRAM(s) Oral every 6 hours  furosemide   Injectable 20 milliGRAM(s) IV Push daily  levETIRAcetam 500 milliGRAM(s) Oral two times a day  metoprolol succinate ER 25 milliGRAM(s) Oral daily  oxyCODONE    IR 15 milliGRAM(s) Oral every 4 hours PRN Moderate Pain (4 - 6)  oxyCODONE    IR 20 milliGRAM(s) Oral every 4 hours PRN Severe Pain (7 - 10)  pantoprazole    Tablet 40 milliGRAM(s) Oral before breakfast  senna 2 Tablet(s) Oral at bedtime    Labs:  CBC Full  -  ( 18 Sep 2022 06:58 )  WBC Count : 18.44 K/uL  RBC Count : 4.41 M/uL  Hemoglobin : 12.1 g/dL  Hematocrit : 38.9 %  Platelet Count - Automated : 467 K/uL  Mean Cell Volume : 88.2 fl  Mean Cell Hemoglobin : 27.4 pg  Mean Cell Hemoglobin Concentration : 31.1 gm/dL  Auto Neutrophil # : x  Auto Lymphocyte # : x  Auto Monocyte # : x  Auto Eosinophil # : x  Auto Basophil # : x  Auto Neutrophil % : x  Auto Lymphocyte % : x  Auto Monocyte % : x  Auto Eosinophil % : x  Auto Basophil % : x            Radiology:     < from: MR Head w/wo IV Cont (09.14.22 @ 19:13) >  IMPRESSION:    -Extensive intracranial metastatic disease with surrounding vasogenic   edema. Largest lesions are detailed above.    -Left inferior cerebellar lesion partially effaces the caudal aspect of   the fourth ventricle and left foramen Luschka, with possible associated   mild hydrocephalus.    < end of copied text >          ROS:  Patient comfortable without distress  No SOB or chest pain  No palpitation  No abdominal pain, diarrhaea or constipation  No weakness of extremities  No skin changes or swelling of legs  Rest of the comprehensive ROS was negative  Vital Signs Last 24 Hrs  T(C): 36.4 (18 Sep 2022 11:32), Max: 36.4 (17 Sep 2022 21:34)  T(F): 97.5 (18 Sep 2022 11:32), Max: 97.6 (17 Sep 2022 21:34)  HR: 75 (18 Sep 2022 11:32) (70 - 75)  BP: 137/84 (18 Sep 2022 11:32) (137/84 - 150/83)  BP(mean): --  RR: 18 (18 Sep 2022 11:32) (18 - 18)  SpO2: 95% (18 Sep 2022 11:32) (93% - 95%)    Parameters below as of 18 Sep 2022 11:32  Patient On (Oxygen Delivery Method): room air        Physical exam:  Patient alert and oriented  No distress  CVS: S1, S2 regular or murmur  Chest: bilateral breath sound without rales  Abdomen: soft, not tender, no organomegaly or masses  CNS: No focal neuro deficit  Musculoskeletal:  Normal range of motion  Skin: No rash    Assessment and Plan: 77 yo F with h/o colon CA with  mets was on  oral chemo, but not for past 1 mo  Admitted 9/13/22 with AMS x4 weeks, worsening over the last few days.   She also had  right sided rib pain for weeks as well and sob on exertion.  As  per , pt slipped and fell in the bathroom while getting up from the toilet 3 weeks ago.  Unsure if she had any LOC. Has had unsteady gait since.   Had  +intermittent episodes of diarrhea  Denied nausea, vomiting, fever, chills, cough, chest pain, blood in stool, urinary complaints, headache.   Followed for metastatic colon cancer and brain mets    PAST MEDICAL & SURGICAL HISTORY:  COPD (chronic obstructive pulmonary disease)  mild      Colon cancer      History of cholecystectomy  2000      Bone spur  Lt foot 5th digit 2014      H/O bilateral breast biopsy  2002      S/P tonsillectomy  childhood        Allergies    Levaquin ([place])    Intolerances          Medications:  dexAMETHasone     Tablet 4 milliGRAM(s) Oral every 6 hours  furosemide   Injectable 20 milliGRAM(s) IV Push daily  levETIRAcetam 500 milliGRAM(s) Oral two times a day  metoprolol succinate ER 25 milliGRAM(s) Oral daily  oxyCODONE    IR 15 milliGRAM(s) Oral every 4 hours PRN Moderate Pain (4 - 6)  oxyCODONE    IR 20 milliGRAM(s) Oral every 4 hours PRN Severe Pain (7 - 10)  pantoprazole    Tablet 40 milliGRAM(s) Oral before breakfast  senna 2 Tablet(s) Oral at bedtime    Labs:  CBC Full  -  ( 18 Sep 2022 06:58 )  WBC Count : 18.44 K/uL  RBC Count : 4.41 M/uL  Hemoglobin : 12.1 g/dL  Hematocrit : 38.9 %  Platelet Count - Automated : 467 K/uL  Mean Cell Volume : 88.2 fl  Mean Cell Hemoglobin : 27.4 pg  Mean Cell Hemoglobin Concentration : 31.1 gm/dL  Auto Neutrophil # : x  Auto Lymphocyte # : x  Auto Monocyte # : x  Auto Eosinophil # : x  Auto Basophil # : x  Auto Neutrophil % : x  Auto Lymphocyte % : x  Auto Monocyte % : x  Auto Eosinophil % : x  Auto Basophil % : x            Radiology:     < from: MR Head w/wo IV Cont (09.14.22 @ 19:13) >  IMPRESSION:    -Extensive intracranial metastatic disease with surrounding vasogenic   edema. Largest lesions are detailed above.    -Left inferior cerebellar lesion partially effaces the caudal aspect of   the fourth ventricle and left foramen Luschka, with possible associated   mild hydrocephalus.    < end of copied text >          ROS:  Patient comfortable without distress  No SOB or chest pain  No palpitation  No abdominal pain, diarrhaea or constipation  No weakness of extremities  No skin changes or swelling of legs  Rest of the comprehensive ROS was negative  Vital Signs Last 24 Hrs  T(C): 36.4 (18 Sep 2022 11:32), Max: 36.4 (17 Sep 2022 21:34)  T(F): 97.5 (18 Sep 2022 11:32), Max: 97.6 (17 Sep 2022 21:34)  HR: 75 (18 Sep 2022 11:32) (70 - 75)  BP: 137/84 (18 Sep 2022 11:32) (137/84 - 150/83)  BP(mean): --  RR: 18 (18 Sep 2022 11:32) (18 - 18)  SpO2: 95% (18 Sep 2022 11:32) (93% - 95%)    Parameters below as of 18 Sep 2022 11:32  Patient On (Oxygen Delivery Method): room air        Physical exam:  Patient alert and oriented  No distress  CVS: S1, S2   Chest: bilateral breath sound without rales  Abdomen: soft, not tender, no organomegaly or masses  CNS: No focal neuro deficit  Musculoskeletal:  Normal range of motion  Skin: No rash    Assessment and Plan:

## 2022-09-18 NOTE — PROGRESS NOTE ADULT - SUBJECTIVE AND OBJECTIVE BOX
CARDIOLOGY     PROGRESS  NOTE   ________________________________________________    CHIEF COMPLAINT:Patient is a 76y old  Female who presents with a chief complaint of ams/ sob (17 Sep 2022 10:24)  no complain.  	  REVIEW OF SYSTEMS:  CONSTITUTIONAL: No fever, weight loss, or fatigue  EYES: No eye pain, visual disturbances, or discharge  ENT:  No difficulty hearing, tinnitus, vertigo; No sinus or throat pain  NECK: No pain or stiffness  RESPIRATORY: No cough, wheezing, chills or hemoptysis; No Shortness of Breath  CARDIOVASCULAR: No chest pain, palpitations, passing out, dizziness, or leg swelling  GASTROINTESTINAL: No abdominal or epigastric pain. No nausea, vomiting, or hematemesis; No diarrhea or constipation. No melena or hematochezia.  GENITOURINARY: No dysuria, frequency, hematuria, or incontinence  NEUROLOGICAL: No headaches, memory loss, loss of strength, numbness, or tremors  SKIN: No itching, burning, rashes, or lesions   LYMPH Nodes: No enlarged glands  ENDOCRINE: No heat or cold intolerance; No hair loss  MUSCULOSKELETAL: No joint pain or swelling; No muscle, back, or extremity pain  PSYCHIATRIC: No depression, anxiety, mood swings, or difficulty sleeping  HEME/LYMPH: No easy bruising, or bleeding gums  ALLERGY AND IMMUNOLOGIC: No hives or eczema	    [ ] All others negative	  [ x] Unable to obtain    PHYSICAL EXAM:  T(C): 36.4 (09-18-22 @ 04:38), Max: 36.7 (09-17-22 @ 12:47)  HR: 72 (09-18-22 @ 04:38) (70 - 81)  BP: 150/83 (09-18-22 @ 04:38) (146/76 - 156/75)  RR: 18 (09-18-22 @ 04:38) (17 - 18)  SpO2: 95% (09-18-22 @ 04:38) (93% - 95%)  Wt(kg): --  I&O's Summary    17 Sep 2022 07:01  -  18 Sep 2022 07:00  --------------------------------------------------------  IN: 360 mL / OUT: 0 mL / NET: 360 mL        Appearance: Normal	  HEENT:   Normal oral mucosa, PERRL, EOMI	  Lymphatic: No lymphadenopathy  Cardiovascular: Normal S1 S2, No JVD, N+ murmurs, No edema  Respiratory: decrease bs  Gastrointestinal:  Soft, Non-tender, + BS	  Skin: No rashes, No ecchymoses, No cyanosis	  Neurologic: Non-focal  Extremities: Normal range of motion, No clubbing, cyanosis or edema  Vascular: Peripheral pulses palpable 2+ bilaterally    MEDICATIONS  (STANDING):  dexAMETHasone     Tablet 4 milliGRAM(s) Oral every 6 hours  furosemide   Injectable 20 milliGRAM(s) IV Push daily  levETIRAcetam 500 milliGRAM(s) Oral two times a day  metoprolol succinate ER 25 milliGRAM(s) Oral daily  pantoprazole    Tablet 40 milliGRAM(s) Oral before breakfast  senna 2 Tablet(s) Oral at bedtime      TELEMETRY: 	    ECG:  	  RADIOLOGY:  OTHER: 	  	  LABS:	 	    CARDIAC MARKERS:                                12.1   18.44 )-----------( 467      ( 18 Sep 2022 06:58 )             38.9           proBNP: Serum Pro-Brain Natriuretic Peptide: 2287 pg/mL (09-13 @ 18:51)    Lipid Profile:   HgA1c:   TSH:     Called that pt's pain was not controlled on current regimen of Oxycodone 15/20mg q6h prn mod/severe pain. Pt was requesting PRNs q4h.  - Increased Oxycodone 15mg PRN mod pain from q6h to q4h   - Increased Oxycodone 20mg PRN mod pain from q6h to q4h  - Monitor for decreased mental status/respiratory depression.  - Bowel regimen while receiving opioids.    Assessment and plan  ---------------------------  77 yo F h/o colon CA with  mets was on   chemo ,  no longer on oral chemo x  past  1 mo p/w AMS x4 weeks, worsening over the last few days.     had also been c/o right sided rib pain for weeks as well.sob  on exertion x days.     pmd  d r brand   sent pt in for further evaluation as pt presented to her with AMS, worsening weakness and lethargy.     Per , pt slipped and fell in the bathroom while getting up from the toilet 3 weeks ago.    Unsure if she had any LOC. Has had unsteady gait since.      +intermittent episodes of diarrhea     Denies nausea, vomiting, fever, chills, cough, chest pain, blood in stool, urinary complaints, headache.   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  AC contraindicated  pt needs IVC filter any way despite doppler  check le venous doppler awaiting  echo in er noted with normal ef and bl pleural effusion with increase pro bnp  dc ivf  palliative care/ onc  noted  increase beta blocker for bp control

## 2022-09-18 NOTE — PROGRESS NOTE ADULT - SUBJECTIVE AND OBJECTIVE BOX
afberile    REVIEW OF SYSTEMS:  GEN: no fever,    no chills  RESP: no SOB,   no cough  CVS: no chest pain,   no palpitations  GI: no abdominal pain,   no nausea,   no vomiting,   no constipation,   no diarrhea  : no dysuria,   no frequency  NEURO: no headache,   no dizziness  PSYCH: no depression,   not anxious  Derm : no rash    MEDICATIONS  (STANDING):  dexAMETHasone     Tablet 4 milliGRAM(s) Oral every 6 hours  furosemide   Injectable 20 milliGRAM(s) IV Push daily  levETIRAcetam 500 milliGRAM(s) Oral two times a day  metoprolol succinate ER 25 milliGRAM(s) Oral daily  pantoprazole    Tablet 40 milliGRAM(s) Oral before breakfast  senna 2 Tablet(s) Oral at bedtime    MEDICATIONS  (PRN):  oxyCODONE    IR 15 milliGRAM(s) Oral every 4 hours PRN Moderate Pain (4 - 6)  oxyCODONE    IR 20 milliGRAM(s) Oral every 4 hours PRN Severe Pain (7 - 10)      Vital Signs Last 24 Hrs  T(C): 36.4 (18 Sep 2022 11:32), Max: 36.7 (17 Sep 2022 12:47)  T(F): 97.5 (18 Sep 2022 11:32), Max: 98.1 (17 Sep 2022 12:47)  HR: 75 (18 Sep 2022 11:32) (70 - 81)  BP: 137/84 (18 Sep 2022 11:32) (137/84 - 156/75)  BP(mean): --  RR: 18 (18 Sep 2022 11:32) (17 - 18)  SpO2: 95% (18 Sep 2022 11:32) (93% - 95%)    Parameters below as of 18 Sep 2022 11:32  Patient On (Oxygen Delivery Method): room air      CAPILLARY BLOOD GLUCOSE        I&O's Summary    17 Sep 2022 07:01  -  18 Sep 2022 07:00  --------------------------------------------------------  IN: 360 mL / OUT: 0 mL / NET: 360 mL        PHYSICAL EXAM:  HEAD:  Atraumatic, Normocephalic  NECK: Supple, No   JVD  CHEST/LUNG:   no     rales,     no,    rhonchi  HEART: Regular rate and rhythm;         murmur  ABDOMEN: Soft, Nontender, ;   EXTREMITIES:     no   edema  NEUROLOGY:  alert    LABS:                        12.1   18.44 )-----------( 467      ( 18 Sep 2022 06:58 )             38.9                                   Consultant(s) Notes Reviewed:      Care Discussed with Consultants/Other Providers:

## 2022-09-18 NOTE — PROGRESS NOTE ADULT - ASSESSMENT
77 yo F with h/o colon CA with  mets, stated she was dx in 2021 and had radiation from Nov to Dec 2021. She then had infusional chemo till march 2022 and oral pills and then only oral pills. No chemo since siddhartha July/August 2022.  Admitted 9/13/22 with AMS x4 weeks, worsening over the last few days.   She also had  right sided rib pain for weeks as well and sob on exertion.  As  per , pt slipped and fell in the bathroom while getting up from the toilet 3 weeks ago.  Unsure if she had any LOC. Has had unsteady gait since.   Had  +intermittent episodes of diarrhea  She has been found to have brain mets, extensive metastatic disease, No cord compression on MRI spine, pulmonary embolism and DVT.   IVC filter done 9/16/22.  She is on Decadron, keppra and pain meds.  Prognosis is extremely poor.   Extensive brain mets makes her have very poor prognosis in addition to high volume metastatic disease with prior treatments and poor PS.  I had extensive discussion with patient's daughter. She confirmed the history of rectal cancer given by her mother. She stated mets in ling and liver were found after she received RT with chemo pills.  She stated NS told her about radiosurgery for brain mets  Told her that decision will come from radiation oncology. 77 yo F with h/o colon CA with  mets, stated she was dx in 2021 and had radiation from Nov to Dec 2021. She then had infusional chemo till march 2022 and oral pills and then only oral pills. No chemo since siddhartha July/August 2022.  Admitted 9/13/22 with AMS x4 weeks, worsening over the last few days.   She also had  right sided rib pain for weeks as well and sob on exertion.  As  per , pt slipped and fell in the bathroom while getting up from the toilet 3 weeks ago.  Unsure if she had any LOC. Has had unsteady gait since.   Had  +intermittent episodes of diarrhea  She has been found to have brain mets, extensive metastatic disease, No cord compression on MRI spine, pulmonary embolism and DVT.   IVC filter done 9/16/22.  She is on Decadron, keppra and pain meds.  Prognosis is extremely poor.   Extensive brain mets makes her have very poor prognosis in addition to high volume metastatic disease with prior treatments and poor PS.  I had extensive discussion with patient's daughter. She confirmed the history of rectal cancer given by her mother. She stated mets in ling and liver were found after she received RT with chemo pills.  She understands RT will see her mom regarding extensive brain mets,  discuss options and plan with her and her mom.. No systemic therapy in hospital. She will get in touch with her primary oncologist regarding management of her mother's  high volume, previously treated systemic therapy options or supportive care only

## 2022-09-18 NOTE — PROGRESS NOTE ADULT - ASSESSMENT
77 yo F        h/o colon CA with  mets      was on   chemo ,  no longer on oral chemo x  past  1 mo     p/w AMS x4 weeks, worsening over the last few days.     had also been c/o right sided rib pain for weeks as well.sob  on exertion x days.     pmd  d r brand   sent pt in for further evaluation as pt presented to her with AMS, worsening weakness and lethargy.     Per , pt slipped and fell in the bathroom while getting up from the toilet 3 weeks ago.    Unsure if she had any LOC. Has had unsteady gait since.      +intermittent episodes of diarrhea     Denies nausea, vomiting, fever, chills, cough, chest pain, blood in stool, urinary complaints, headache.     admitted   with  ams.  from cerebral   mets  with bleed.  able to  converse   h/o  metastatic ca colon, oncologist dr jose enrique ignacio   N/S  eval, no intervention,   pt has no focal weakness   CT  chest angio. ,Pe, b/l pl effusions, hepatic mets /        T4 comp fx, with bony retropulsion,   TLSO  brace  per  N/S     pt  with +   PE/ R  leg  DVT,   unable to  a/c pt , given cerebral bleed/ family  aware of risks   wbc  noted. from malignancy  afebrile , , fall risk/ PT eval   oncology dr bullock  ,  on Decadron  for  h'gic mets, seen by  N/S         aware of  poor prognosis.  family   had  meeting  with palliative care, on 9/ 15,  remains  full code    s/p   IVC filter  by  IR  rx  plan per  dr bullock  is  palliative  in nature,  RT  to  brain / agree  with  oncology  that,  pt  ideal  candidate  for  comfort  care/  hospice  spoke with  daughter ,  pt remains  full  code  pain meds per  palliative  care team/   continue  current care           rad< from: CT Head No Cont (09.13.22 @ 18:46) >  RESSION:  Multiple hyperdense nodules are may represent hemorrhagic metastasis in a   patient with history of colon carcinoma. Further evaluation with contrast   enhanced brain MRI is recommended.  No acute intracranial hemorrhage.  --- End of Report ---       RAD< from: CT Abdomen and Pelvis w/ IV Cont (09.13.22 @ 18:48) >  IMPRESSION:  Segmental and subsegmental acute pulmonary arterial emboli as detailed   above. Presence of pulmonary embolism was discussed with Dr. Aguirre by Dr. Orta on September 13, 2022 at 8:03 PM.  Extensive metastatic disease as detailed above.  Small multiloculated bilateral pleural effusions.  Compression fracture deformity of the T4 vertebral body with some   retropulsion of the fractured vertebral body into the spinal canal.   Dedicated thoracic spine MRI can be performed for complete evaluation as   clinically warranted.  --- End of Report ---

## 2022-09-19 PROBLEM — C18.9 MALIGNANT NEOPLASM OF COLON, UNSPECIFIED: Chronic | Status: ACTIVE | Noted: 2022-01-01

## 2022-09-19 NOTE — PROGRESS NOTE ADULT - SUBJECTIVE AND OBJECTIVE BOX
Indication for Geriatrics and Palliative Care Services/INTERVAL HPI: symptom management and GOC in setting of advanced malignancy  SUBJECTIVE AND OBJECTIVE: Pt seen and examined at bedside. Reports better pain control with current regimen.     OVERNIGHT EVENTS: Required PRN PO oxycodone 20mg x4 in 24 hours 8am-8am.    DNR on chart:  Allergies    Levaquin (Flushing)    Intolerances    MEDICATIONS  (STANDING):  dexAMETHasone     Tablet 4 milliGRAM(s) Oral every 6 hours  furosemide   Injectable 20 milliGRAM(s) IV Push daily  levETIRAcetam 500 milliGRAM(s) Oral two times a day  metoprolol succinate ER 25 milliGRAM(s) Oral daily  morphine ER Tablet 15 milliGRAM(s) Oral every 12 hours  pantoprazole    Tablet 40 milliGRAM(s) Oral before breakfast  senna 2 Tablet(s) Oral at bedtime    MEDICATIONS  (PRN):  oxyCODONE    IR 15 milliGRAM(s) Oral every 4 hours PRN Moderate Pain (4 - 6)  oxyCODONE    IR 20 milliGRAM(s) Oral every 4 hours PRN Severe Pain (7 - 10)      ITEMS UNCHECKED ARE NOT PRESENT    PRESENT SYMPTOMS: [ ]Unable to self-report - see [ ] CPOT [ ] PAINADS [ ] RDOS  Source if other than patient:  [ ]Family   [ ]Team     Pain: [x ]yes [ ]no  QOL impact - impacts ADLS  Location - epigastric region and right side of abdomen/flank region                   Aggravating factors - movement  Quality - throbbing  Radiation - none  Timing- constant   Severity (0-10 scale): 10  Minimal acceptable level (0-10 scale): 2-3      CPOT:    https://www.[facility].org/getattachment/[email or web address]-5z7o-[email or web address]/Critical-Care-Pain-Observation-Tool-(CPOT)    Dyspnea:                           [ ]Mild [ ]Moderate [ ]Severe  Anxiety:                             [ ]Mild [ ]Moderate [ ]Severe  Fatigue:                             [ ]Mild [ ]Moderate [ ]Severe  Nausea:                             [ ]Mild [ ]Moderate [ ]Severe  Loss of appetite:              [ ]Mild [ ]Moderate [ ]Severe  Constipation:                    [ ]Mild [ ]Moderate [ ]Severe    PCSSQ[Palliative Care Spiritual Screening Question]   Severity (0-10):  Score of 4 or > indicate consideration of Chaplaincy referral.  Chaplaincy Referral: [ ] yes [ ] refused [ ] following [x ] Deferred     Caregiver [place]? : [ ] yes [x ] no [ ] Deferred [ ] Declined             Social work referral [ ] Patient & Family Centered Care Referral [ ]     Anticipatory Grief present?:  [ ] yes [x ] no  [ ] Deferred                  Social work referral [ ] Chaplaincy Referral[ ]      Other Symptoms:  [x ]All other review of systems negative   [ ]Unable to obtain due to poor mentation    Palliative Performance Status Version 2:   40      %      http://npcrc.org/files/news/palliative_performance_scale_ppsv2.pdf  PHYSICAL EXAM:  Vital Signs Last 24 Hrs  T(C): 36.4 (19 Sep 2022 11:27), Max: 36.4 (19 Sep 2022 04:58)  T(F): 97.5 (19 Sep 2022 11:27), Max: 97.5 (19 Sep 2022 04:58)  HR: 72 (19 Sep 2022 11:27) (72 - 86)  BP: 145/75 (19 Sep 2022 11:27) (122/74 - 145/75)  BP(mean): --  RR: 18 (19 Sep 2022 11:27) (18 - 18)  SpO2: 95% (19 Sep 2022 11:27) (93% - 95%)    Parameters below as of 19 Sep 2022 11:27  Patient On (Oxygen Delivery Method): room air     I&O's Summary    18 Sep 2022 07:01  -  19 Sep 2022 07:00  --------------------------------------------------------  IN: 600 mL / OUT: 300 mL / NET: 300 mL       GENERAL:  [x]Alert  [x]Oriented x 3  [ ]Lethargic  [ ]Cachexia  [ ]Unarousable  [x]Verbal  [ ]Non-Verbal  Behavioral:   [ ]Anxiety  [ ]Delirium [ ]Agitation [ ]Other  HEENT:  [x]Normal   [ ]Dry mouth   [ ]ET Tube/Trach  [ ]Oral lesions  PULMONARY:   [x]Clear [ ]Tachypnea  [ ]Audible excessive secretions   [ ]Rhonchi        [ ]Right [ ]Left [ ]Bilateral  [ ]Crackles        [ ]Right [ ]Left [ ]Bilateral  [ ]Wheezing     [ ]Right [ ]Left [ ]Bilateral  [ ]Diminished BS [ ] Right [ ]Left [ ]Bilateral  CARDIOVASCULAR:    [x]Regular [ ]Irregular [ ]Tachy  [ ]Bobo [ ]Murmur [ ]Other  GASTROINTESTINAL:  [x]Soft  [ ]Distended   [x]+BS  [ ]Non tender [x ]Tender  [ ]PEG [ ]OGT/ NGT   Last BM:  No BM documented  GENITOURINARY:  [x]Normal [ ]Incontinent   [ ]Oliguria/Anuria   [ ]Shore  MUSCULOSKELETAL:   [ ]Normal   [x]Weakness  [ ]Bed/Wheelchair bound [ ]Edema  NEUROLOGIC:   [x]No focal deficits  [ ] Cognitive impairment  [ ] Dysphagia [ ]Dysarthria [ ] Paresis [ ]Other   SKIN:   [x]Normal  [ ]Rash   [ ]Pressure ulcer(s) [ ]y [ ]n present on admission    CRITICAL CARE:  [ ] Shock Present  [ ]Septic [ ]Cardiogenic [ ]Neurologic [ ]Hypovolemic  [ ]  Vasopressors [ ]  Inotropes   [ ]Respiratory failure present [ ]Mechanical ventilation [ ]Non-invasive ventilatory support [ ]High flow    [ ]Acute  [ ]Chronic [ ]Hypoxic  [ ]Hypercarbic [ ]Other  [ ]Other organ failure     LABS:                        12.0   17.44 )-----------( 389      ( 19 Sep 2022 07:11 )             38.8   09-19    133<L>  |  92<L>  |  40<H>  ----------------------------<  127<H>  3.8   |  26  |  0.59    Ca    10.2      19 Sep 2022 07:13  Phos  2.8     09-19  Mg     2.2     09-19          RADIOLOGY & ADDITIONAL STUDIES: < from: MR Head w/wo IV Cont (09.14.22 @ 19:13) >  ACC: [id number] EXAM:  MR BRAIN WAW IC                          PROCEDURE DATE:  09/14/2022          INTERPRETATION:  CLINICAL INDICATION: Brain metastases. Altered mental   status.    TECHNIQUE: Multi-planar multi-sequential MR imaging of the brain was   performed before and after the intravenous administration of contrast.   7.5 ml of Gadavist IV contrast was administered.    COMPARISON: CT head 9/13/2022.    FINDINGS:  Numerous enhancing intracranial lesions throughout the bilateral cerebral   hemispheres and left cerebellum with surrounding edema. The largest   lesions measure 2.8 x 2.2 x 2.8 cm in the right occipital lobe (4-3), 2.7   x 2.0 x 2.4 cm in the right parietal lobe (4-13) and 2.4 x 1.7 x 1.6 cm   and the left inferior cerebellum (3-12). The left inferior cerebellar   lesion partially effaces the caudal aspect of the fourth ventricle and   left foramen of Luschka.    There is partial effacement of the right lateral ventricle secondary to   surrounding vasogenic edema. However there is mild prominence of the left   lateral ventricle with FLAIR hyperintense signal along the frontal and   occipital horns. Mild hydrocephalus with transependymal CSF flow cannot   be ruled out.     No acute infarct or intracranial hemorrhage identified. No extra-axial   fluid collections. The skull base flow voids are present.    The visualized intraorbital contents are normal. The imaged portions of   the paranasal sinuses are clear. The mastoid air cells are clear. The   visualized osseous structures, soft tissues and partially visualized   parotid glands appear normal.    IMPRESSION:    -Extensive intracranial metastatic disease with surrounding vasogenic   edema. Largest lesions are detailed above.    -Left inferior cerebellar lesion partially effaces the caudal aspect of   the fourth ventricle and left foramen Luschka, with possible associated   mild hydrocephalus.    --- End of Report ---            NIKKY LAM MD; Attending Radiologist  This document has been electronically signed. Sep 15 2022 10:08AM    < end of copied text >        Protein Calorie Malnutrition Present: [ ]mild [ ]moderate [ ]severe [ ]underweight [ ]morbid obesity  https://www.andeal.org/vault/2440/web/files/ONC/Table_Clinical%20Characteristics%20to%20Document%20Malnutrition-White%20JV%20et%20al%202012.pdf    Height (cm): 170.2 (09-16-22 @ 16:54)  Weight (kg): 55.3 (09-16-22 @ 16:54)  BMI (kg/m2): 19.1 (09-16-22 @ 16:54)    [ ]PPSV2 < or = 30%  [ ]significant weight loss [ ]poor nutritional intake [ ]anasarca[ ]Artificial Nutrition    Other REFERRALS:  [ ]Hospice  [ ]Child Life  [ ]Social Work  [ ]Case management [ ]Holistic Therapy     Goals of Care Document:EVA Knapp (09-15-22 @ 15:35)  Goals of Care Conversation:   Participants:  · Participants  Patient; Family; Staff  · Spouse  Mayank  · Child(cabrera)  daughter Alissa, son Cuong  · Relative  daughter in law Karrie  · Provider  Dr. Villagomez of Palliative  ·   Navin Royal LCSW    Advance Directives:  · Does patient have Advance Directive  No  · Does Patient Have a Surrogate  No  · Does the Patient have a Court Appointed Guardian ([id number])  No  · Caregiver:  declines    Conversation Discussion:  · Conversation  Diagnosis; Prognosis; MOLST Discussed; Treatment Options  · Conversation Details  GAP consulted to assist in GOC and active pain/symptom management in the setting of patient with advanced illness. [place] and Dr. Villagomez met with patient and patient's family today for family meeting.    Team first introduced selves and roles in patient care. Patient and family verbalized understanding and were receptive to visit today. Team next inquired into patient understanding of current medical status, and patient demonstrates understanding of status. Patient states that she is aware that she has cancer and notes a functional change immediately prior to admission including weakness and AMS. Patient states that she was previously receiving OP treatment but had stopped approximately three weeks ago due to the effects the treatment was having on patient's status.     Team validated this overview today, and discussed plans for Oncology visit today and input regarding patient's status and next steps in care. Team inquired into patient goals at this time whilst patient and family awaits oncology input, and patient notes feeling uncertain if she would wish to undergo further treatment due to the experience she had prior to stopping treatment. Patient and family note wishing to hear more from oncology about options prior to making any further decisions in care, and team validated this again today.    Team lastly inquired into advance care planning, and inquired what patient might find to be acceptable in the event her heart were to stop. Patient states that she is unsure what she would find to be acceptable, but states she will consider the question further and will discuss amongst her family. Team encouraged this discussion and discussed plans for follow up regarding the topic.    Team lastly discussed patient's symptom burden and regimen that is recommended to assist in care. Patient and family verbalized agreement and understanding, and note that patient symptom management is a paramount concern at present.     GAP will continue to follow this case, and patient remains full code at this time.    What Matters Most To Patient and Family:  · What matters most to patient and family  Time spent with family, pain/symptom management    Personal Advance Directives Treatment Guidelines:   Treatment Guidelines:  · Decision Maker  Patient    Location of Discussion:   Time Spent on Advance Care Planning:  I spent 30 (in minutes) on advance care planning services with the patient.  This time is separate and distinct from any other care management services provided on this date.    Location of Discussion:  · Location of discussion  Face to face      Electronic Signatures:  Navin Royal ([facility])  (Signed 15-Sep-2022 15:48)  	Authored: Goals of Care Conversation, Personal Advance Directives Treatment Guidelines, Location of Discussion  Lyn Villagomez)  (Signed 16-Sep-2022 11:20)  	Authored: Goals of Care Conversation      Last Updated: 16-Sep-2022 11:20 by Lyn Villagomez)

## 2022-09-19 NOTE — PROGRESS NOTE ADULT - SUBJECTIVE AND OBJECTIVE BOX
CARDIOLOGY     PROGRESS  NOTE   ________________________________________________    CHIEF COMPLAINT:Patient is a 76y old  Female who presents with a chief complaint of ams/ sob (19 Sep 2022 07:21)  no complain.  	  REVIEW OF SYSTEMS:  CONSTITUTIONAL: No fever, weight loss, or fatigue  EYES: No eye pain, visual disturbances, or discharge  ENT:  No difficulty hearing, tinnitus, vertigo; No sinus or throat pain  NECK: No pain or stiffness  RESPIRATORY: No cough, wheezing, chills or hemoptysis; No Shortness of Breath  CARDIOVASCULAR: No chest pain, palpitations, passing out, dizziness, or leg swelling  GASTROINTESTINAL: No abdominal or epigastric pain. No nausea, vomiting, or hematemesis; No diarrhea or constipation. No melena or hematochezia.  GENITOURINARY: No dysuria, frequency, hematuria, or incontinence  NEUROLOGICAL: No headaches, memory loss, loss of strength, numbness, or tremors  SKIN: No itching, burning, rashes, or lesions   LYMPH Nodes: No enlarged glands  ENDOCRINE: No heat or cold intolerance; No hair loss  MUSCULOSKELETAL: No joint pain or swelling; No muscle, back, or extremity pain  PSYCHIATRIC: No depression, anxiety, mood swings, or difficulty sleeping  HEME/LYMPH: No easy bruising, or bleeding gums  ALLERGY AND IMMUNOLOGIC: No hives or eczema	    [ ] All others negative	  [x ] Unable to obtain    PHYSICAL EXAM:  T(C): 36.4 (09-19-22 @ 04:58), Max: 36.4 (09-18-22 @ 11:32)  HR: 74 (09-19-22 @ 04:58) (74 - 86)  BP: 122/74 (09-19-22 @ 04:58) (122/74 - 137/84)  RR: 18 (09-19-22 @ 04:58) (18 - 18)  SpO2: 94% (09-19-22 @ 04:58) (93% - 95%)  Wt(kg): --  I&O's Summary    18 Sep 2022 07:01  -  19 Sep 2022 07:00  --------------------------------------------------------  IN: 600 mL / OUT: 300 mL / NET: 300 mL        Appearance: Normal	  HEENT:   Normal oral mucosa, PERRL, EOMI	  Lymphatic: No lymphadenopathy  Cardiovascular: Normal S1 S2, No JVD, + murmurs, No edema  Respiratory: rhonchi  Gastrointestinal:  Soft, Non-tender, + BS	  Skin: No rashes, No ecchymoses, No cyanosis	  Neurologic: Non-focal  Extremities: Normal range of motion, No clubbing, cyanosis or edema  Vascular: Peripheral pulses palpable 2+ bilaterally    MEDICATIONS  (STANDING):  dexAMETHasone     Tablet 4 milliGRAM(s) Oral every 6 hours  furosemide   Injectable 20 milliGRAM(s) IV Push daily  levETIRAcetam 500 milliGRAM(s) Oral two times a day  metoprolol succinate ER 25 milliGRAM(s) Oral daily  pantoprazole    Tablet 40 milliGRAM(s) Oral before breakfast  senna 2 Tablet(s) Oral at bedtime      TELEMETRY: 	    ECG:  	  RADIOLOGY:  OTHER: 	  	  LABS:	 	    CARDIAC MARKERS:                                12.1   18.44 )-----------( 467      ( 18 Sep 2022 06:58 )             38.9           proBNP: Serum Pro-Brain Natriuretic Peptide: 2287 pg/mL (09-13 @ 18:51)    Lipid Profile:   HgA1c:   TSH:     < from: CT Angio Chest PE Protocol w/ IV Cont (09.13.22 @ 18:48) >  Segmental and subsegmental acute pulmonary arterial emboli as detailed   above. Presence of pulmonary embolism was discussed with Dr. Aguirre by Dr. Orta on September 13, 2022 at 8:03 PM.    Extensive metastatic disease as detailed above.    Small multiloculated bilateral pleural effusions.    Compression fracture deformity of the T4 vertebral body with some   retropulsion of the fractured vertebral body into the spinal canal.   Dedicated thoracic spine MRI can be performed for complete evaluation as   clinically warranted.    Assessment and plan  ---------------------------  75 yo F h/o colon CA with  mets was on   chemo ,  no longer on oral chemo x  past  1 mo p/w AMS x4 weeks, worsening over the last few days.     had also been c/o right sided rib pain for weeks as well.sob  on exertion x days.     pmd  d r brand   sent pt in for further evaluation as pt presented to her with AMS, worsening weakness and lethargy.     Per , pt slipped and fell in the bathroom while getting up from the toilet 3 weeks ago.    Unsure if she had any LOC. Has had unsteady gait since.      +intermittent episodes of diarrhea     Denies nausea, vomiting, fever, chills, cough, chest pain, blood in stool, urinary complaints, headache.   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  AC contraindicated  pt needs IVC filter any way despite doppler  check le venous doppler awaiting  echo in er noted with normal ef and bl pleural effusion with increase pro bnp  dc ivf  palliative care/ onc  noted  increase beta blocker for bp control, controlled now  PE on no AC sec to brain hemorrhagic mets

## 2022-09-19 NOTE — PROGRESS NOTE ADULT - PROBLEM SELECTOR PLAN 1
Continue:  -Tylenol 650mg q6h PRN mild pain   -oxycodone 15mg q4h PRN moderate pain   -oxycodone 20mg q4h PRN severe pain   Start:  MS Contin PO 15mg BID  Hold above medications for sedation or RR <10  bowel regimen

## 2022-09-19 NOTE — PROGRESS NOTE ADULT - SUBJECTIVE AND OBJECTIVE BOX
Interventional Radiology Follow-Up Note.    This is a 76y Female s/p retrievable IVC filter placement on 9/16 in Interventional Radiology with Dr. Perea.     Patient seen and examined @ bedside. No complaint offered.    Medication:     furosemide   Injectable: (09-19)  metoprolol succinate ER: (09-19)    Vitals:   T(F): 97.5, Max: 97.5 (11:32)  HR: 74  BP: 122/74  RR: 18  SpO2: 94%    Physical Exam:  General: Nontoxic, in NAD.   groin: right groin with dressing c/d/i. Site soft, small bump at access site likely small hematoma. mild ttp      LABS:  Na:   K:   Cl:   CO2:   BUN:   Cr:   Glu:     Hgb: 12.0 (09-19 @ 07:11), 12.1 (09-18 @ 06:58), 13.1 (09-17 @ 07:40)  Hct: 38.8 (09-19 @ 07:11), 38.9 (09-18 @ 06:58), 41.5 (09-17 @ 07:40)  WBC: 17.44 (09-19 @ 07:11), 18.44 (09-18 @ 06:58), 16.04 (09-17 @ 07:40)  Plt: 389 (09-19 @ 07:11), 467 (09-18 @ 06:58), 504 (09-17 @ 07:40)    INR:   PTT:        Assessment/Plan:  77 yo F with h/o colon CA with brain mets was on oral chemo, but not for past  1 month. Patient admitted on 9/13/22 with AMS x4 weeks, worsening over the last few days. She also had  right sided rib pain for weeks as well and sob on exertion. As  per , pt slipped and fell in the bathroom while getting up from the toilet 3 weeks ago. Unsure if she had any LOC. Has had unsteady gait since. Had  +intermittent episodes of diarrhea. Denied nausea, vomiting, fever, chills, cough, chest pain, blood in stool, urinary complaints, headache. Pt with PE/ DVT unable to be on anticoagulation due to cerebral hemorrhage. Patient is s/p retrievable IVC filter placement on 9/16 in Interventional Radiology with Dr. Perea.      -Monitor groin site. Discussed with primary team NP Maris.   -Patient with retrievable filter. 1 month telehealth follow up with Dr. Perea for evaluation of potential filter retrieval.   - IR will sign off.   -Global care per primary team.     Please call IR at  [phone] with any questions, concerns, or issues regarding above.    Also available on Teams.

## 2022-09-19 NOTE — PROGRESS NOTE ADULT - SUBJECTIVE AND OBJECTIVE BOX
afebrile  REVIEW OF SYSTEMS:  GEN: no fever,    no chills  RESP: no SOB,   no cough  CVS: no chest pain,   no palpitations  GI: no abdominal pain,   no nausea,   no vomiting,   no constipation,   no diarrhea  : no dysuria,   no frequency  NEURO: no headache,   no dizziness  PSYCH: no depression,   not anxious  Derm : no rash    MEDICATIONS  (STANDING):  dexAMETHasone     Tablet 4 milliGRAM(s) Oral every 6 hours  furosemide   Injectable 20 milliGRAM(s) IV Push daily  levETIRAcetam 500 milliGRAM(s) Oral two times a day  metoprolol succinate ER 25 milliGRAM(s) Oral daily  pantoprazole    Tablet 40 milliGRAM(s) Oral before breakfast  senna 2 Tablet(s) Oral at bedtime    MEDICATIONS  (PRN):  oxyCODONE    IR 15 milliGRAM(s) Oral every 4 hours PRN Moderate Pain (4 - 6)  oxyCODONE    IR 20 milliGRAM(s) Oral every 4 hours PRN Severe Pain (7 - 10)      Vital Signs Last 24 Hrs  T(C): 36.4 (19 Sep 2022 04:58), Max: 36.4 (18 Sep 2022 11:32)  T(F): 97.5 (19 Sep 2022 04:58), Max: 97.5 (18 Sep 2022 11:32)  HR: 74 (19 Sep 2022 04:58) (74 - 86)  BP: 122/74 (19 Sep 2022 04:58) (122/74 - 137/84)  BP(mean): --  RR: 18 (19 Sep 2022 04:58) (18 - 18)  SpO2: 94% (19 Sep 2022 04:58) (93% - 95%)    Parameters below as of 19 Sep 2022 04:58  Patient On (Oxygen Delivery Method): room air      CAPILLARY BLOOD GLUCOSE        I&O's Summary    18 Sep 2022 07:01  -  19 Sep 2022 07:00  --------------------------------------------------------  IN: 600 mL / OUT: 300 mL / NET: 300 mL        PHYSICAL EXAM:  HEAD:  Atraumatic, Normocephalic  NECK: Supple, No   JVD  CHEST/LUNG:   no     rales,     no,    rhonchi  HEART: Regular rate and rhythm;         murmur  ABDOMEN: Soft, Nontender, ;   EXTREMITIES:    no    edema  NEUROLOGY:  alert    LABS:                        12.1   18.44 )-----------( 467      ( 18 Sep 2022 06:58 )             38.9                                   Consultant(s) Notes Reviewed:      Care Discussed with Consultants/Other Providers:

## 2022-09-19 NOTE — PROGRESS NOTE ADULT - ASSESSMENT
75 yo F        h/o colon CA with  mets    no longer on oral chemo x  past  1 mo     p/w AMS x4 weeks, worsening over the last few days.  had  c/o right sided rib pain for weeks  and .sob  on exertion x days.     pmd  dr coffman   sent pt in ,  presented to her with AMS, worsening weakness and lethargy.     Per , pt slipped and fell in the bathroom while getting up from the toilet 3 weeks ago.       admitted   with  ams.  from cerebral   mets  with bleed.  able to  converse   h/o  metastatic Ca colon, oncologist dr jose enrique ignacio   N/S  eval, no intervention,   pt has no focal weakness   CT  chest angio. :  ,Pe, b/l pl effusions, hepatic mets /   T4 comp fx, with bony retropulsion,     TLSO  brace  per  N/S     pt  with +   PE/ R  leg  DVT,   unable to  a/c pt , given cerebral bleed/ family  aware of risks   wbc  noted. from malignancy  afebrile , , fall risk/ PT eval   oncology dr bullock  ,  on Decadron  for  h'gic mets, seen by  N/S         aware of  poor prognosis.  family   had  meeting  with palliative care, on 9/ 15,  remains  full code    s/p   IVC filter  by  IR  rx  plan per  dr bullock  is  palliative  in nature,  RT  to  brain / agree  with  oncology  that,  pt  ideal  candidate  for  comfort  care/  hospice  spoke with  daughter ,  pt remains  full  code  pain meds per  palliative  care team/   continue  current care  per  daughter   will  decide   on further   trajectory  after  meeting with  consultants            rad< from: CT Head No Cont (09.13.22 @ 18:46) >  RESSION:  Multiple hyperdense nodules are may represent hemorrhagic metastasis in a   patient with history of colon carcinoma. Further evaluation with contrast   enhanced brain MRI is recommended.  No acute intracranial hemorrhage.  --- End of Report ---       RAD< from: CT Abdomen and Pelvis w/ IV Cont (09.13.22 @ 18:48) >  IMPRESSION:  Segmental and subsegmental acute pulmonary arterial emboli as detailed   above. Presence of pulmonary embolism was discussed with Dr. Aguirre by Dr. Orta on September 13, 2022 at 8:03 PM.  Extensive metastatic disease as detailed above.  Small multiloculated bilateral pleural effusions.  Compression fracture deformity of the T4 vertebral body with some   retropulsion of the fractured vertebral body into the spinal canal.   Dedicated thoracic spine MRI can be performed for complete evaluation as   clinically warranted.  --- End of Report ---

## 2022-09-19 NOTE — PROGRESS NOTE ADULT - PROBLEM SELECTOR PLAN 5
Will continue to follow for GOC and symptom management.   Pain recommnedations discussed with primary team ACP.    For acute issues or uncontrolled symptoms please page palliative team.    Lyn Villagomez MD  Geriatrics and Palliative Medicine Attending  [facility] pager: [phone]     The Geriatrics and Palliative Medicine consult service has 24/7 coverage for medical recommendations, including symptom management needs.

## 2022-09-19 NOTE — CONSULT NOTE ADULT - SUBJECTIVE AND OBJECTIVE BOX
HPI:  77 yo F h/o stage 3 colon ca w/ known mets to lung came to the ED on 9/13/22 having worsening AMS for the last on month.    Last chemo reg finished 1mo ago. Has had AMS/confusion worsening over last few days. Has also had unsteady gait x 1mo.       CTH: mult hyperdense lesions c/f mets (?heme).       Seen by neurosurgery on 9/13/22 , Dr. Lopez:   No acute surgical intervention.  MRI brain recommended.   Pt seen and examined. MRI reviewed.  Mult intracerebral mets - 7  Will discuss with RT re: GK SRS vs WBRT .    < from: CT Angio Chest PE Protocol w/ IV Cont (09.13.22 @ 18:48) >  IMPRESSION:  Segmental and subsegmental acute pulmonary arterial emboli as detailed   above. Presence of pulmonary embolism was discussed with Dr. Aguirre by Dr. Orta on September 13, 2022 at 8:03 PM.    Extensive metastatic disease as detailed above.    Small multiloculated bilateral pleural effusions.    Compression fracture deformity of the T4 vertebral body with some   retropulsion of the fractured vertebral body into the spinal canal.   Dedicated thoracic spine MRI can be performed for complete evaluation as   clinically warranted.      < end of copied text >          < from: MR Head w/wo IV Cont (09.14.22 @ 19:13) >  FINDINGS:  Numerous enhancing intracranial lesions throughout the bilateral cerebral   hemispheres and left cerebellum with surrounding edema. The largest   lesions measure 2.8 x 2.2 x 2.8 cm in the right occipital lobe (4-3), 2.7   x 2.0 x 2.4 cm in the right parietal lobe (4-13) and 2.4 x 1.7 x 1.6 cm   and the left inferior cerebellum (3-12). The left inferior cerebellar   lesion partially effaces the caudal aspect of the fourth ventricle and   left foramen of Luschka.    There is partial effacement of the right lateral ventricle secondary to   surrounding vasogenic edema. However there is mild prominence of the left   lateral ventricle with FLAIR hyperintense signal along the frontal and   occipital horns. Mild hydrocephalus with transependymal CSF flow cannot   be ruled out.     No acute infarct or intracranial hemorrhage identified. No extra-axial   fluid collections. The skull base flow voids are present.    The visualized intraorbital contents are normal. The imaged portions of   the paranasal sinuses are clear. The mastoid air cells are clear. The   visualized osseous structures, soft tissues and partially visualized   parotid glands appear normal.    IMPRESSION:    -Extensive intracranial metastatic disease with surrounding vasogenic   edema. Largest lesions are detailed above.    -Left inferior cerebellar lesion partially effaces the caudal aspect of   the fourth ventricle and left foramen Luschka, with possible associated   mild hydrocephalus.      < from: MR Thoracic Spine w/wo IV Cont (09.14.22 @ 19:12) >  IMPRESSION:    Acute to subacute severe T4 compression fracture with bony retropulsion   resulting in mild spinal canal stenosis. No thoracic cord compression.    No evidence for thoracic spinal metastases.              Allergies    Levaquin (Flushing)    Intolerances        ROS: [  ] Fever  [  ] Chills  [  ]Chest Pain [  ] SOB  [  ]Cough [  ] N/V  [  ] Diarrhea [  ]Constipation [  ]Other ROS:  [  ] ROS otherwise negative    PAST MEDICAL & SURGICAL HISTORY:  COPD (chronic obstructive pulmonary disease)  mild    Colon cancer    History of cholecystectomy  2000    Bone spur  Lt foot 5th digit 2014    H/O bilateral breast biopsy  2002    S/P tonsillectomy  childhood        FAMILY HISTORY:      MEDICATIONS  (STANDING):  dexAMETHasone     Tablet 4 milliGRAM(s) Oral every 6 hours  furosemide   Injectable 20 milliGRAM(s) IV Push daily  levETIRAcetam 500 milliGRAM(s) Oral two times a day  metoprolol succinate ER 25 milliGRAM(s) Oral daily  pantoprazole    Tablet 40 milliGRAM(s) Oral before breakfast  senna 2 Tablet(s) Oral at bedtime    MEDICATIONS  (PRN):  oxyCODONE    IR 15 milliGRAM(s) Oral every 4 hours PRN Moderate Pain (4 - 6)  oxyCODONE    IR 20 milliGRAM(s) Oral every 4 hours PRN Severe Pain (7 - 10)      PHYSICAL EXAM  Vital Signs Last 24 Hrs  T(C): 36.4 (19 Sep 2022 11:27), Max: 36.4 (19 Sep 2022 04:58)  T(F): 97.5 (19 Sep 2022 11:27), Max: 97.5 (19 Sep 2022 04:58)  HR: 72 (19 Sep 2022 11:27) (72 - 86)  BP: 145/75 (19 Sep 2022 11:27) (122/74 - 145/75)  BP(mean): --  RR: 18 (19 Sep 2022 11:27) (18 - 18)  SpO2: 95% (19 Sep 2022 11:27) (93% - 95%)    Parameters below as of 19 Sep 2022 11:27  Patient On (Oxygen Delivery Method): room air        General: Well nourished, well developed, no acute distress  HEENT: NC/AT; EOMI, PERRL, sclera nonicteric; external ears normal; no rhinorrhea or epistaxis; mucous membranes moist; oropharynx clear and without erythema  CV: NR, RR; no appreciable r/m/g  Lungs: CTAB, no increased work of breathing  Abdomen: Bowel sounds present; soft, NTND  MSK: Vertebral spine non-tender to palpation  Neuro: AAOx3; cranial nerves II-XII intact; strength 5/5 in upper and lower extremities; sensation to light touch in tact bilaterally.  Psych: Full affect; mood congruent  Skin: no visible rashes on limited examination    IMAGING/LABS/PATHOLOGY: I have personally reviewed the relevant labs, pathology, and imaging as noted in the HPI.  In addition,                          12.0   17.44 )-----------( 389      ( 19 Sep 2022 07:11 )             38.8     09-19    133<L>  |  92<L>  |  40<H>  ----------------------------<  127<H>  3.8   |  26  |  0.59    Ca    10.2      19 Sep 2022 07:13  Phos  2.8     09-19  Mg     2.2     09-19          ASSESSMENT/PLAN    NIKIA POP is a 76y woman with  HPI:  77 yo F h/o stage 3 colon ca w/ known mets to lung came to the ED on 22 having worsening AMS for the last on month.    Last chemo reg finished 1mo ago. Has had AMS/confusion worsening over last few days. Has also had unsteady gait x 1mo.   Seen at bedside to discuss GammaKnife radiosurgery for brain metastases on imaging.  During my visit, we called her daughter Alissa to also discuss our plan of care.      CTH: mult hyperdense lesions c/f mets (?heme).       Seen by neurosurgery on 22 , Dr. Lopez:   No acute surgical intervention.  MRI brain recommended.   Pt seen and examined. MRI reviewed.  Mult intracerebral mets - 7  Will discuss with RT re: GK SRS vs WBRT .    < from: CT Angio Chest PE Protocol w/ IV Cont (22 @ 18:48) >  IMPRESSION:  Segmental and subsegmental acute pulmonary arterial emboli as detailed   above. Presence of pulmonary embolism was discussed with Dr. Aguirre by Dr. Orta on 2022 at 8:03 PM.    Extensive metastatic disease as detailed above.    Small multiloculated bilateral pleural effusions.    Compression fracture deformity of the T4 vertebral body with some   retropulsion of the fractured vertebral body into the spinal canal.   Dedicated thoracic spine MRI can be performed for complete evaluation as   clinically warranted.      < end of copied text >          < from: MR Head w/wo IV Cont (22 @ 19:13) >  FINDINGS:  Numerous enhancing intracranial lesions throughout the bilateral cerebral   hemispheres and left cerebellum with surrounding edema. The largest   lesions measure 2.8 x 2.2 x 2.8 cm in the right occipital lobe (4-3), 2.7   x 2.0 x 2.4 cm in the right parietal lobe (4-13) and 2.4 x 1.7 x 1.6 cm   and the left inferior cerebellum (3-12). The left inferior cerebellar   lesion partially effaces the caudal aspect of the fourth ventricle and   left foramen of Luschka.    There is partial effacement of the right lateral ventricle secondary to   surrounding vasogenic edema. However there is mild prominence of the left   lateral ventricle with FLAIR hyperintense signal along the frontal and   occipital horns. Mild hydrocephalus with transependymal CSF flow cannot   be ruled out.     No acute infarct or intracranial hemorrhage identified. No extra-axial   fluid collections. The skull base flow voids are present.    The visualized intraorbital contents are normal. The imaged portions of   the paranasal sinuses are clear. The mastoid air cells are clear. The   visualized osseous structures, soft tissues and partially visualized   parotid glands appear normal.    IMPRESSION:    -Extensive intracranial metastatic disease with surrounding vasogenic   edema. Largest lesions are detailed above.    -Left inferior cerebellar lesion partially effaces the caudal aspect of   the fourth ventricle and left foramen Luschka, with possible associated   mild hydrocephalus.      < from: MR Thoracic Spine w/wo IV Cont (22 @ 19:12) >  IMPRESSION:    Acute to subacute severe T4 compression fracture with bony retropulsion   resulting in mild spinal canal stenosis. No thoracic cord compression.    No evidence for thoracic spinal metastases.              Allergies    Levaquin (Flushing)    Intolerances        ROS: [  ] Fever  [  ] Chills  [  ]Chest Pain [  ] SOB  [  ]Cough [  ] N/V  [  ] Diarrhea [  ]Constipation [  ]Other ROS:  [  ] ROS otherwise negative    PAST MEDICAL & SURGICAL HISTORY:  COPD (chronic obstructive pulmonary disease)  mild    Colon cancer    History of cholecystectomy      Bone spur  Lt foot 5th digit     H/O bilateral breast biopsy  2002    S/P tonsillectomy  childhood        FAMILY HISTORY:      MEDICATIONS  (STANDING):  dexAMETHasone     Tablet 4 milliGRAM(s) Oral every 6 hours  furosemide   Injectable 20 milliGRAM(s) IV Push daily  levETIRAcetam 500 milliGRAM(s) Oral two times a day  metoprolol succinate ER 25 milliGRAM(s) Oral daily  pantoprazole    Tablet 40 milliGRAM(s) Oral before breakfast  senna 2 Tablet(s) Oral at bedtime    MEDICATIONS  (PRN):  oxyCODONE    IR 15 milliGRAM(s) Oral every 4 hours PRN Moderate Pain (4 - 6)  oxyCODONE    IR 20 milliGRAM(s) Oral every 4 hours PRN Severe Pain (7 - 10)      PHYSICAL EXAM  KPS 50  Vital Signs Last 24 Hrs  T(C): 36.4 (19 Sep 2022 11:27), Max: 36.4 (19 Sep 2022 04:58)  T(F): 97.5 (19 Sep 2022 11:27), Max: 97.5 (19 Sep 2022 04:58)  HR: 72 (19 Sep 2022 11:27) (72 - 86)  BP: 145/75 (19 Sep 2022 11:27) (122/74 - 145/75)  BP(mean): --  RR: 18 (19 Sep 2022 11:27) (18 - 18)  SpO2: 95% (19 Sep 2022 11:27) (93% - 95%)    Parameters below as of 19 Sep 2022 11:27  Patient On (Oxygen Delivery Method): room air        General: thin, frail, no acute distress, conversant  HEENT: NC/AT; EOMI, PERRL, sclera nonicteric; external ears normal; no rhinorrhea or epistaxis; mucous membranes moist; oropharynx clear and without erythema  CV: NR, RR; no appreciable r/m/g  Lungs: CTAB, no increased work of breathing  Abdomen: Bowel sounds present; soft, NTND  MSK: Vertebral spine non-tender to palpation  Neuro: AAOx3; cranial nerves II-XII intact; strength 5/5 in upper and lower extremities; sensation to light touch in tact bilaterally.      IMAGING/LABS/PATHOLOGY: I have personally reviewed the relevant labs, pathology, and imaging as noted in the HPI.  In addition,                          12.0   17.44 )-----------( 389      ( 19 Sep 2022 07:11 )             38.8     09-19    133<L>  |  92<L>  |  40<H>  ----------------------------<  127<H>  3.8   |  26  |  0.59    Ca    10.2      19 Sep 2022 07:13  Phos  2.8     09-19  Mg     2.2     09-19          ASSESSMENT/PLAN    NIKIA POP is a 76y woman with h/o metastatic colon cancer first diagnosed 2021, [facility] Tawanna Goncalves, but has been off chemotherapy about one month now, prior h/o rectal RT at [facility], denies prior RT to the brain, came to the ED 22 for worsening AMS, near [place] inpatient work up.  Imaging showed numerous intracranial lesions at bilateral cerebral hemispheres, the right occiput is 2.8cm, and left cerebellar is 2.4cm.    The plan as discussed with Dr. Tran/ Dr. Nathan is outpatient gammaknife radiosurgery after discharge.  This is done at [address]  adjacent to [facility].  It cannot be done while inpatient.    I explained the procedure at length, risks/benefits were also discussed.   They will continue to follow up with Dr. Tran upon discharge.  He can also be reached by [phone] to schedule appointment time/day after discharge.  Case d/w Dr. Tran.   Thank you.   HPI:  77 yo F h/o stage 3 colon ca w/ known mets to lung came to the ED on 22 having worsening AMS for the last on month.    Last chemo reg finished 1mo ago. Has had AMS/confusion worsening over last few days. Has also had unsteady gait x 1mo.   Seen at bedside to discuss GammaKnife radiosurgery for brain metastases on imaging as discussed with Dr. Tran, Dr. Nathan.   During my visit, we called her daughter Alissa to also discuss our plan of care and reason being new intracranial brain metastases.     9/15 NSG note: MRI brain with multiple brain mets: recommend Rad onc for radiosurgery. Please have patient FU       CTH: mult hyperdense lesions c/f mets (?heme).       Seen by neurosurgery on 22 , Dr. Lopez:   No acute surgical intervention.  MRI brain recommended.   Pt seen and examined. MRI reviewed.  Mult intracerebral mets - 7  Will discuss with RT re: GK SRS vs WBRT .    < from: CT Angio Chest PE Protocol w/ IV Cont (22 @ 18:48) >  IMPRESSION:  Segmental and subsegmental acute pulmonary arterial emboli as detailed   above. Presence of pulmonary embolism was discussed with Dr. Aguirre by Dr. Orta on 2022 at 8:03 PM.    Extensive metastatic disease as detailed above.    Small multiloculated bilateral pleural effusions.    Compression fracture deformity of the T4 vertebral body with some   retropulsion of the fractured vertebral body into the spinal canal.   Dedicated thoracic spine MRI can be performed for complete evaluation as   clinically warranted.      < end of copied text >          < from: MR Head w/wo IV Cont (22 @ 19:13) >  FINDINGS:  Numerous enhancing intracranial lesions throughout the bilateral cerebral   hemispheres and left cerebellum with surrounding edema. The largest   lesions measure 2.8 x 2.2 x 2.8 cm in the right occipital lobe (4-3), 2.7   x 2.0 x 2.4 cm in the right parietal lobe (4-13) and 2.4 x 1.7 x 1.6 cm   and the left inferior cerebellum (3-12). The left inferior cerebellar   lesion partially effaces the caudal aspect of the fourth ventricle and   left foramen of Luschka.    There is partial effacement of the right lateral ventricle secondary to   surrounding vasogenic edema. However there is mild prominence of the left   lateral ventricle with FLAIR hyperintense signal along the frontal and   occipital horns. Mild hydrocephalus with transependymal CSF flow cannot   be ruled out.     No acute infarct or intracranial hemorrhage identified. No extra-axial   fluid collections. The skull base flow voids are present.    The visualized intraorbital contents are normal. The imaged portions of   the paranasal sinuses are clear. The mastoid air cells are clear. The   visualized osseous structures, soft tissues and partially visualized   parotid glands appear normal.    IMPRESSION:    -Extensive intracranial metastatic disease with surrounding vasogenic   edema. Largest lesions are detailed above.    -Left inferior cerebellar lesion partially effaces the caudal aspect of   the fourth ventricle and left foramen Luschka, with possible associated   mild hydrocephalus.      < from: MR Thoracic Spine w/wo IV Cont (22 @ 19:12) >  IMPRESSION:    Acute to subacute severe T4 compression fracture with bony retropulsion   resulting in mild spinal canal stenosis. No thoracic cord compression.    No evidence for thoracic spinal metastases.              Allergies    Levaquin (Flushing)    Intolerances        ROS: [  ] Fever  [  ] Chills  [  ]Chest Pain [  ] SOB  [  ]Cough [  ] N/V  [  ] Diarrhea [  ]Constipation [  ]Other ROS:  [  ] ROS otherwise negative    PAST MEDICAL & SURGICAL HISTORY:  COPD (chronic obstructive pulmonary disease)  mild    Colon cancer    History of cholecystectomy      Bone spur  Lt foot 5th digit     H/O bilateral breast biopsy      S/P tonsillectomy  childhood        FAMILY HISTORY:      MEDICATIONS  (STANDING):  dexAMETHasone     Tablet 4 milliGRAM(s) Oral every 6 hours  furosemide   Injectable 20 milliGRAM(s) IV Push daily  levETIRAcetam 500 milliGRAM(s) Oral two times a day  metoprolol succinate ER 25 milliGRAM(s) Oral daily  pantoprazole    Tablet 40 milliGRAM(s) Oral before breakfast  senna 2 Tablet(s) Oral at bedtime    MEDICATIONS  (PRN):  oxyCODONE    IR 15 milliGRAM(s) Oral every 4 hours PRN Moderate Pain (4 - 6)  oxyCODONE    IR 20 milliGRAM(s) Oral every 4 hours PRN Severe Pain (7 - 10)      PHYSICAL EXAM  KPS 50  Vital Signs Last 24 Hrs  T(C): 36.4 (19 Sep 2022 11:27), Max: 36.4 (19 Sep 2022 04:58)  T(F): 97.5 (19 Sep 2022 11:27), Max: 97.5 (19 Sep 2022 04:58)  HR: 72 (19 Sep 2022 11:27) (72 - 86)  BP: 145/75 (19 Sep 2022 11:27) (122/74 - 145/75)  BP(mean): --  RR: 18 (19 Sep 2022 11:27) (18 - 18)  SpO2: 95% (19 Sep 2022 11:27) (93% - 95%)    Parameters below as of 19 Sep 2022 11:27  Patient On (Oxygen Delivery Method): room air        General: thin, frail, no acute distress, conversant  HEENT: NC/AT; EOMI, PERRL, sclera nonicteric; external ears normal; no rhinorrhea or epistaxis; mucous membranes moist; oropharynx clear and without erythema  CV: NR, RR; no appreciable r/m/g  Lungs: CTAB, no increased work of breathing  Abdomen: Bowel sounds present; soft, NTND  MSK: Vertebral spine non-tender to palpation  Neuro: AAOx3; cranial nerves II-XII intact; strength 5/5 in upper and lower extremities; sensation to light touch in tact bilaterally.      IMAGING/LABS/PATHOLOGY: I have personally reviewed the relevant labs, pathology, and imaging as noted in the HPI.  In addition,                          12.0   17.44 )-----------( 389      ( 19 Sep 2022 07:11 )             38.8     09-19    133<L>  |  92<L>  |  40<H>  ----------------------------<  127<H>  3.8   |  26  |  0.59    Ca    10.2      19 Sep 2022 07:13  Phos  2.8     09-  Mg     2.2     -          ASSESSMENT/PLAN    NIKIA POP is a 76y woman with h/o metastatic colon cancer first diagnosed 2021, was on oral chemotherapy with oncologist from [facility] Dr. Tawanna Goncalves, but has been off chemotherapy about one month now, prior h/o rectal RT at [facility], denies prior RT to the brain, came to the ED 22 for worsening AMS, [place].  Imaging showed numerous intracranial lesions at bilateral cerebral hemispheres, the right occiput is 2.8cm, and left cerebellar is 2.4cm.    The plan as discussed with Dr. Tran/ Dr. Nathan is outpatient gammaknife radiosurgery after discharge.  This is done at [address]  adjacent to [facility].  It cannot be done while inpatient.    I explained the procedure at length, risks/benefits were also discussed.   They will continue to follow up with Dr. Tran upon discharge.  The daughter Alissa is aware.   Our department can reached by [phone] to schedule appointment time/day after discharge.    Thank you.

## 2022-09-20 PROBLEM — G93.9 BRAIN LESION: Status: ACTIVE | Noted: 2022-01-01

## 2022-09-20 NOTE — PROGRESS NOTE ADULT - SUBJECTIVE AND OBJECTIVE BOX
Indication for Geriatrics and Palliative Care Services/INTERVAL HPI: symptom management and GOC in setting of advanced malignancy  SUBJECTIVE AND OBJECTIVE: Pt seen and examined at bedside. Reports better pain control with current regimen.     OVERNIGHT EVENTS: Required PRN PO oxycodone 20mg x4 in 24 hours 8am-8am.    DNR on chart:  Allergies    Levaquin (Flushing)    Intolerances    MEDICATIONS  (STANDING):  dexAMETHasone     Tablet 4 milliGRAM(s) Oral every 6 hours  furosemide   Injectable 20 milliGRAM(s) IV Push daily  levETIRAcetam 500 milliGRAM(s) Oral two times a day  metoprolol succinate ER 25 milliGRAM(s) Oral daily  morphine ER Tablet 15 milliGRAM(s) Oral every 12 hours  pantoprazole    Tablet 40 milliGRAM(s) Oral before breakfast  senna 2 Tablet(s) Oral at bedtime    MEDICATIONS  (PRN):  oxyCODONE    IR 15 milliGRAM(s) Oral every 4 hours PRN Moderate Pain (4 - 6)  oxyCODONE    IR 20 milliGRAM(s) Oral every 4 hours PRN Severe Pain (7 - 10)      ITEMS UNCHECKED ARE NOT PRESENT    PRESENT SYMPTOMS: [ ]Unable to self-report - see [ ] CPOT [ ] PAINADS [ ] RDOS  Source if other than patient:  [ ]Family   [ ]Team     Pain: [x ]yes [ ]no  QOL impact - impacts ADLS  Location - epigastric region and right side of abdomen/flank region                   Aggravating factors - movement  Quality - throbbing  Radiation - none  Timing- constant   Severity (0-10 scale): 10  Minimal acceptable level (0-10 scale): 2-3      CPOT:    https://www.[facility].org/getattachment/[email or web address]/Critical-Care-Pain-Observation-Tool-(CPOT)    Dyspnea:                           [ ]Mild [ ]Moderate [ ]Severe  Anxiety:                             [ ]Mild [ ]Moderate [ ]Severe  Fatigue:                             [ ]Mild [ ]Moderate [ ]Severe  Nausea:                             [ ]Mild [ ]Moderate [ ]Severe  Loss of appetite:              [ ]Mild [ ]Moderate [ ]Severe  Constipation:                    [ ]Mild [ ]Moderate [ ]Severe    PCSSQ[Palliative Care Spiritual Screening Question]   Severity (0-10):  Score of 4 or > indicate consideration of Chaplaincy referral.  Chaplaincy Referral: [ ] yes [ ] refused [ ] following [x ] Deferred     Caregiver [place]? : [ ] yes [x ] no [ ] Deferred [ ] Declined             Social work referral [ ] Patient & Family Centered Care Referral [ ]     Anticipatory Grief present?:  [ ] yes [x ] no  [ ] Deferred                  Social work referral [ ] Chaplaincy Referral[ ]      Other Symptoms:  [x ]All other review of systems negative   [ ]Unable to obtain due to poor mentation    Palliative Performance Status Version 2:   40      %      http://npcrc.org/files/news/palliative_performance_scale_ppsv2.pdf  PHYSICAL EXAM:  Vital Signs Last 24 Hrs  T(C): 36.4 (19 Sep 2022 11:27), Max: 36.4 (19 Sep 2022 04:58)  T(F): 97.5 (19 Sep 2022 11:27), Max: 97.5 (19 Sep 2022 04:58)  HR: 72 (19 Sep 2022 11:27) (72 - 86)  BP: 145/75 (19 Sep 2022 11:27) (122/74 - 145/75)  BP(mean): --  RR: 18 (19 Sep 2022 11:27) (18 - 18)  SpO2: 95% (19 Sep 2022 11:27) (93% - 95%)    Parameters below as of 19 Sep 2022 11:27  Patient On (Oxygen Delivery Method): room air     I&O's Summary    18 Sep 2022 07:01  -  19 Sep 2022 07:00  --------------------------------------------------------  IN: 600 mL / OUT: 300 mL / NET: 300 mL       GENERAL:  [x]Alert  [x]Oriented x 3  [ ]Lethargic  [ ]Cachexia  [ ]Unarousable  [x]Verbal  [ ]Non-Verbal  Behavioral:   [ ]Anxiety  [ ]Delirium [ ]Agitation [ ]Other  HEENT:  [x]Normal   [ ]Dry mouth   [ ]ET Tube/Trach  [ ]Oral lesions  PULMONARY:   [x]Clear [ ]Tachypnea  [ ]Audible excessive secretions   [ ]Rhonchi        [ ]Right [ ]Left [ ]Bilateral  [ ]Crackles        [ ]Right [ ]Left [ ]Bilateral  [ ]Wheezing     [ ]Right [ ]Left [ ]Bilateral  [ ]Diminished BS [ ] Right [ ]Left [ ]Bilateral  CARDIOVASCULAR:    [x]Regular [ ]Irregular [ ]Tachy  [ ]Bobo [ ]Murmur [ ]Other  GASTROINTESTINAL:  [x]Soft  [ ]Distended   [x]+BS  [ ]Non tender [x ]Tender  [ ]PEG [ ]OGT/ NGT   Last BM:  No BM documented  GENITOURINARY:  [x]Normal [ ]Incontinent   [ ]Oliguria/Anuria   [ ]Shore  MUSCULOSKELETAL:   [ ]Normal   [x]Weakness  [ ]Bed/Wheelchair bound [ ]Edema  NEUROLOGIC:   [x]No focal deficits  [ ] Cognitive impairment  [ ] Dysphagia [ ]Dysarthria [ ] Paresis [ ]Other   SKIN:   [x]Normal  [ ]Rash   [ ]Pressure ulcer(s) [ ]y [ ]n present on admission    CRITICAL CARE:  [ ] Shock Present  [ ]Septic [ ]Cardiogenic [ ]Neurologic [ ]Hypovolemic  [ ]  Vasopressors [ ]  Inotropes   [ ]Respiratory failure present [ ]Mechanical ventilation [ ]Non-invasive ventilatory support [ ]High flow    [ ]Acute  [ ]Chronic [ ]Hypoxic  [ ]Hypercarbic [ ]Other  [ ]Other organ failure     LABS:                        12.0   17.44 )-----------( 389      ( 19 Sep 2022 07:11 )             38.8   09-19    133<L>  |  92<L>  |  40<H>  ----------------------------<  127<H>  3.8   |  26  |  0.59    Ca    10.2      19 Sep 2022 07:13  Phos  2.8     09-19  Mg     2.2     09-19          RADIOLOGY & ADDITIONAL STUDIES: < from: MR Head w/wo IV Cont (09.14.22 @ 19:13) >  ACC: [id number] EXAM:  MR BRAIN WAW IC                          PROCEDURE DATE:  09/14/2022          INTERPRETATION:  CLINICAL INDICATION: Brain metastases. Altered mental   status.    TECHNIQUE: Multi-planar multi-sequential MR imaging of the brain was   performed before and after the intravenous administration of contrast.   7.5 ml of Gadavist IV contrast was administered.    COMPARISON: CT head 9/13/2022.    FINDINGS:  Numerous enhancing intracranial lesions throughout the bilateral cerebral   hemispheres and left cerebellum with surrounding edema. The largest   lesions measure 2.8 x 2.2 x 2.8 cm in the right occipital lobe (4-3), 2.7   x 2.0 x 2.4 cm in the right parietal lobe (4-13) and 2.4 x 1.7 x 1.6 cm   and the left inferior cerebellum (3-12). The left inferior cerebellar   lesion partially effaces the caudal aspect of the fourth ventricle and   left foramen of Luschka.    There is partial effacement of the right lateral ventricle secondary to   surrounding vasogenic edema. However there is mild prominence of the left   lateral ventricle with FLAIR hyperintense signal along the frontal and   occipital horns. Mild hydrocephalus with transependymal CSF flow cannot   be ruled out.     No acute infarct or intracranial hemorrhage identified. No extra-axial   fluid collections. The skull base flow voids are present.    The visualized intraorbital contents are normal. The imaged portions of   the paranasal sinuses are clear. The mastoid air cells are clear. The   visualized osseous structures, soft tissues and partially visualized   parotid glands appear normal.    IMPRESSION:    -Extensive intracranial metastatic disease with surrounding vasogenic   edema. Largest lesions are detailed above.    -Left inferior cerebellar lesion partially effaces the caudal aspect of   the fourth ventricle and left foramen Luschka, with possible associated   mild hydrocephalus.    --- End of Report ---            NIKKY LAM MD; Attending Radiologist  This document has been electronically signed. Sep 15 2022 10:08AM    < end of copied text >        Protein Calorie Malnutrition Present: [ ]mild [ ]moderate [ ]severe [ ]underweight [ ]morbid obesity  https://www.andeal.org/vault/2440/web/files/ONC/Table_Clinical%20Characteristics%20to%20Document%20Malnutrition-White%20JV%20et%20al%202012.pdf    Height (cm): 170.2 (09-16-22 @ 16:54)  Weight (kg): 55.3 (09-16-22 @ 16:54)  BMI (kg/m2): 19.1 (09-16-22 @ 16:54)    [ ]PPSV2 < or = 30%  [ ]significant weight loss [ ]poor nutritional intake [ ]anasarca[ ]Artificial Nutrition    Other REFERRALS:  [ ]Hospice  [ ]Child Life  [ ]Social Work  [ ]Case management [ ]Holistic Therapy     Goals of Care Document:EVA Knapp (09-15-22 @ 15:35)  Goals of Care Conversation:   Participants:  · Participants  Patient; Family; Staff  · Spouse  Mayank  · Child(cabrera)  daughter Alissa, son Cuong  · Relative  daughter in law Karrie  · Provider  Dr. Villagomez of Palliative  ·   Navin Royal LCSW    Advance Directives:  · Does patient have Advance Directive  No  · Does Patient Have a Surrogate  No  · Does the Patient have a Court Appointed Guardian ([address])  No  · Caregiver:  declines    Conversation Discussion:  · Conversation  Diagnosis; Prognosis; MOLST Discussed; Treatment Options  · Conversation Details  GAP consulted to assist in GOC and active pain/symptom management in the setting of patient with advanced illness. [place] and Dr. Villagomez met with patient and patient's family today for family meeting.    Team first introduced selves and roles in patient care. Patient and family verbalized understanding and were receptive to visit today. Team next inquired into patient understanding of current medical status, and patient demonstrates understanding of status. Patient states that she is aware that she has cancer and notes a functional change immediately prior to admission including weakness and AMS. Patient states that she was previously receiving OP treatment but had stopped approximately three weeks ago due to the effects the treatment was having on patient's status.     Team validated this overview today, and discussed plans for Oncology visit today and input regarding patient's status and next steps in care. Team inquired into patient goals at this time whilst patient and family awaits oncology input, and patient notes feeling uncertain if she would wish to undergo further treatment due to the experience she had prior to stopping treatment. Patient and family note wishing to hear more from oncology about options prior to making any further decisions in care, and team validated this again today.    Team lastly inquired into advance care planning, and inquired what patient might find to be acceptable in the event her heart were to stop. Patient states that she is unsure what she would find to be acceptable, but states she will consider the question further and will discuss amongst her family. Team encouraged this discussion and discussed plans for follow up regarding the topic.    Team lastly discussed patient's symptom burden and regimen that is recommended to assist in care. Patient and family verbalized agreement and understanding, and note that patient symptom management is a paramount concern at present.     GAP will continue to follow this case, and patient remains full code at this time.    What Matters Most To Patient and Family:  · What matters most to patient and family  Time spent with family, pain/symptom management    Personal Advance Directives Treatment Guidelines:   Treatment Guidelines:  · Decision Maker  Patient    Location of Discussion:   Time Spent on Advance Care Planning:  I spent 30 (in minutes) on advance care planning services with the patient.  This time is separate and distinct from any other care management services provided on this date.    Location of Discussion:  · Location of discussion  Face to face      Electronic Signatures:  Navin Royal ([facility])  (Signed 15-Sep-2022 15:48)  	Authored: Goals of Care Conversation, Personal Advance Directives Treatment Guidelines, Location of Discussion  Lyn Villagomez)  (Signed 16-Sep-2022 11:20)  	Authored: Goals of Care Conversation      Last Updated: 16-Sep-2022 11:20 by Lyn Villagomez)     Indication for Geriatrics and Palliative Care Services/INTERVAL HPI: symptom management and GOC in setting of advanced malignancy  SUBJECTIVE AND OBJECTIVE: Pt seen and examined at bedside. Reports better pain control with current regimen.     OVERNIGHT EVENTS: Required PRN PO oxycodone 15mg x3 in 24 hours 8am-8am    DNR on chart:  Allergies    Levaquin (Flushing)    Intolerances    MEDICATIONS  (STANDING):  dexAMETHasone     Tablet 4 milliGRAM(s) Oral every 6 hours  furosemide   Injectable 20 milliGRAM(s) IV Push daily  levETIRAcetam 500 milliGRAM(s) Oral two times a day  metoprolol succinate ER 25 milliGRAM(s) Oral daily  morphine ER Tablet 15 milliGRAM(s) Oral every 12 hours  pantoprazole    Tablet 40 milliGRAM(s) Oral before breakfast  senna 2 Tablet(s) Oral at bedtime    MEDICATIONS  (PRN):  oxyCODONE    IR 15 milliGRAM(s) Oral every 4 hours PRN Moderate Pain (4 - 6)  oxyCODONE    IR 20 milliGRAM(s) Oral every 4 hours PRN Severe Pain (7 - 10)      ITEMS UNCHECKED ARE NOT PRESENT    PRESENT SYMPTOMS: [ ]Unable to self-report - see [ ] CPOT [ ] PAINADS [ ] RDOS  Source if other than patient:  [ ]Family   [ ]Team     Pain: [x ]yes [ ]no  QOL impact - impacts ADLS  Location - epigastric region and right side of abdomen/flank region                   Aggravating factors - movement  Quality - throbbing  Radiation - none  Timing- constant   Severity (0-10 scale): 10  Minimal acceptable level (0-10 scale): 2-3      CPOT:    https://www.[facility].org/getattachment/[email or web address]/Critical-Care-Pain-Observation-Tool-(CPOT)    Dyspnea:                           [ ]Mild [ ]Moderate [ ]Severe  Anxiety:                             [ ]Mild [ ]Moderate [ ]Severe  Fatigue:                             [ ]Mild [ ]Moderate [ ]Severe  Nausea:                             [ ]Mild [ ]Moderate [ ]Severe  Loss of appetite:              [ ]Mild [ ]Moderate [ ]Severe  Constipation:                    [ ]Mild [ ]Moderate [ ]Severe    PCSSQ[Palliative Care Spiritual Screening Question]   Severity (0-10):  Score of 4 or > indicate consideration of Chaplaincy referral.  Chaplaincy Referral: [ ] yes [x ] refused [ ] following [ ] Deferred     Caregiver [place]? : [ ] yes [x ] no [ ] Deferred [ ] Declined             Social work referral [ ] Patient & Family Centered Care Referral [ ]     Anticipatory Grief present?:  [ ] yes [x ] no  [ ] Deferred                  Social work referral [ ] Chaplaincy Referral[ ]      Other Symptoms:  [x ]All other review of systems negative   [ ]Unable to obtain due to poor mentation    Palliative Performance Status Version 2:   40      %      http://npcrc.org/files/news/palliative_performance_scale_ppsv2.pdf  PHYSICAL EXAM:  Vital Signs Last 24 Hrs  T(C): 36.6 (20 Sep 2022 04:24), Max: 36.6 (20 Sep 2022 04:24)  T(F): 97.8 (20 Sep 2022 04:24), Max: 97.8 (20 Sep 2022 04:24)  HR: 81 (20 Sep 2022 04:24) (70 - 81)  BP: 152/79 (20 Sep 2022 04:24) (128/78 - 152/79)  BP(mean): --  RR: 18 (20 Sep 2022 04:24) (18 - 18)  SpO2: 92% (20 Sep 2022 04:24) (92% - 94%)    Parameters below as of 20 Sep 2022 04:24  Patient On (Oxygen Delivery Method): room air     GENERAL:  [x]Alert  [x]Oriented x 3  [ ]Lethargic  [ ]Cachexia  [ ]Unarousable  [x]Verbal  [ ]Non-Verbal  Behavioral:   [ ]Anxiety  [ ]Delirium [ ]Agitation [ ]Other  HEENT:  [x]Normal   [ ]Dry mouth   [ ]ET Tube/Trach  [ ]Oral lesions  PULMONARY:   [x]Clear [ ]Tachypnea  [ ]Audible excessive secretions   [ ]Rhonchi        [ ]Right [ ]Left [ ]Bilateral  [ ]Crackles        [ ]Right [ ]Left [ ]Bilateral  [ ]Wheezing     [ ]Right [ ]Left [ ]Bilateral  [ ]Diminished BS [ ] Right [ ]Left [ ]Bilateral  CARDIOVASCULAR:    [x]Regular [ ]Irregular [ ]Tachy  [ ]Bobo [ ]Murmur [ ]Other  GASTROINTESTINAL:  [x]Soft  [ ]Distended   [x]+BS  [ ]Non tender [x ]Tender  [ ]PEG [ ]OGT/ NGT   Last BM:  No BM documented  GENITOURINARY:  [x]Normal [ ]Incontinent   [ ]Oliguria/Anuria   [ ]Shore  MUSCULOSKELETAL:   [ ]Normal   [x]Weakness  [ ]Bed/Wheelchair bound [ ]Edema  NEUROLOGIC:   [x]No focal deficits  [ ] Cognitive impairment  [ ] Dysphagia [ ]Dysarthria [ ] Paresis [ ]Other   SKIN:   [x]Normal  [ ]Rash   [ ]Pressure ulcer(s) [ ]y [ ]n present on admission    CRITICAL CARE:  [ ] Shock Present  [ ]Septic [ ]Cardiogenic [ ]Neurologic [ ]Hypovolemic  [ ]  Vasopressors [ ]  Inotropes   [ ]Respiratory failure present [ ]Mechanical ventilation [ ]Non-invasive ventilatory support [ ]High flow    [ ]Acute  [ ]Chronic [ ]Hypoxic  [ ]Hypercarbic [ ]Other  [ ]Other organ failure     LABS:                        12.0   17.44 )-----------( 389      ( 19 Sep 2022 07:11 )             38.8   09-19    133<L>  |  92<L>  |  40<H>  ----------------------------<  127<H>  3.8   |  26  |  0.59    Ca    10.2      19 Sep 2022 07:13  Phos  2.8     09-19  Mg     2.2     09-19          RADIOLOGY & ADDITIONAL STUDIES: < from: MR Head w/wo IV Cont (09.14.22 @ 19:13) >  ACC: [id number] EXAM:  MR BRAIN WAW IC                          PROCEDURE DATE:  09/14/2022          INTERPRETATION:  CLINICAL INDICATION: Brain metastases. Altered mental   status.    TECHNIQUE: Multi-planar multi-sequential MR imaging of the brain was   performed before and after the intravenous administration of contrast.   7.5 ml of Gadavist IV contrast was administered.    COMPARISON: CT head 9/13/2022.    FINDINGS:  Numerous enhancing intracranial lesions throughout the bilateral cerebral   hemispheres and left cerebellum with surrounding edema. The largest   lesions measure 2.8 x 2.2 x 2.8 cm in the right occipital lobe (4-3), 2.7   x 2.0 x 2.4 cm in the right parietal lobe (4-13) and 2.4 x 1.7 x 1.6 cm   and the left inferior cerebellum (3-12). The left inferior cerebellar   lesion partially effaces the caudal aspect of the fourth ventricle and   left foramen of Luschka.    There is partial effacement of the right lateral ventricle secondary to   surrounding vasogenic edema. However there is mild prominence of the left   lateral ventricle with FLAIR hyperintense signal along the frontal and   occipital horns. Mild hydrocephalus with transependymal CSF flow cannot   be ruled out.     No acute infarct or intracranial hemorrhage identified. No extra-axial   fluid collections. The skull base flow voids are present.    The visualized intraorbital contents are normal. The imaged portions of   the paranasal sinuses are clear. The mastoid air cells are clear. The   visualized osseous structures, soft tissues and partially visualized   parotid glands appear normal.    IMPRESSION:    -Extensive intracranial metastatic disease with surrounding vasogenic   edema. Largest lesions are detailed above.    -Left inferior cerebellar lesion partially effaces the caudal aspect of   the fourth ventricle and left foramen Luschka, with possible associated   mild hydrocephalus.    --- End of Report ---            NIKKY LAM MD; Attending Radiologist  This document has been electronically signed. Sep 15 2022 10:08AM    < end of copied text >        Protein Calorie Malnutrition Present: [ ]mild [ ]moderate [ ]severe [ ]underweight [ ]morbid obesity  https://www.andeal.org/vault/2440/web/files/ONC/Table_Clinical%20Characteristics%20to%20Document%20Malnutrition-White%20JV%20et%20al%202012.pdf    Height (cm): 170.2 (09-16-22 @ 16:54)  Weight (kg): 55.3 (09-16-22 @ 16:54)  BMI (kg/m2): 19.1 (09-16-22 @ 16:54)    [ ]PPSV2 < or = 30%  [ ]significant weight loss [ ]poor nutritional intake [ ]anasarca[ ]Artificial Nutrition    Other REFERRALS:  [ ]Hospice  [ ]Child Life  [ ]Social Work  [ ]Case management [ ]Holistic Therapy     Goals of Care Document:EVA Knapp (09-15-22 @ 15:35)  Goals of Care Conversation:   Participants:  · Participants  Patient; Family; Staff  · Spouse  Mayank  · Child(cabrera)  daughter Alissa, son Cuong  · Relative  daughter in law Karrie  · Provider  Dr. Villagomez of Palliative  ·   Navin Royal LCSW    Advance Directives:  · Does patient have Advance Directive  No  · Does Patient Have a Surrogate  No  · Does the Patient have a Court Appointed Guardian ([address])  No  · Caregiver:  declines    Conversation Discussion:  · Conversation  Diagnosis; Prognosis; MOLST Discussed; Treatment Options  · Conversation Details  GAP consulted to assist in GOC and active pain/symptom management in the setting of patient with advanced illness. [place] and Dr. Villagomez met with patient and patient's family today for family meeting.    Team first introduced selves and roles in patient care. Patient and family verbalized understanding and were receptive to visit today. Team next inquired into patient understanding of current medical status, and patient demonstrates understanding of status. Patient states that she is aware that she has cancer and notes a functional change immediately prior to admission including weakness and AMS. Patient states that she was previously receiving OP treatment but had stopped approximately three weeks ago due to the effects the treatment was having on patient's status.     Team validated this overview today, and discussed plans for Oncology visit today and input regarding patient's status and next steps in care. Team inquired into patient goals at this time whilst patient and family awaits oncology input, and patient notes feeling uncertain if she would wish to undergo further treatment due to the experience she had prior to stopping treatment. Patient and family note wishing to hear more from oncology about options prior to making any further decisions in care, and team validated this again today.    Team lastly inquired into advance care planning, and inquired what patient might find to be acceptable in the event her heart were to stop. Patient states that she is unsure what she would find to be acceptable, but states she will consider the question further and will discuss amongst her family. Team encouraged this discussion and discussed plans for follow up regarding the topic.    Team lastly discussed patient's symptom burden and regimen that is recommended to assist in care. Patient and family verbalized agreement and understanding, and note that patient symptom management is a paramount concern at present.     GAP will continue to follow this case, and patient remains full code at this time.    What Matters Most To Patient and Family:  · What matters most to patient and family  Time spent with family, pain/symptom management    Personal Advance Directives Treatment Guidelines:   Treatment Guidelines:  · Decision Maker  Patient    Location of Discussion:   Time Spent on Advance Care Planning:  I spent 30 (in minutes) on advance care planning services with the patient.  This time is separate and distinct from any other care management services provided on this date.    Location of Discussion:  · Location of discussion  Face to face      Electronic Signatures:  Navin Royal ([facility])  (Signed 15-Sep-2022 15:48)  	Authored: Goals of Care Conversation, Personal Advance Directives Treatment Guidelines, Location of Discussion  Lyn Villagomez)  (Signed 16-Sep-2022 11:20)  	Authored: Goals of Care Conversation      Last Updated: 16-Sep-2022 11:20 by Lyn Villagomez)     Indication for Geriatrics and Palliative Care Services/INTERVAL HPI: symptom management and GOC in setting of advanced malignancy  SUBJECTIVE AND OBJECTIVE: Pt seen and examined at bedside. Reports better pain control with current regimen.     OVERNIGHT EVENTS: Required PRN PO oxycodone 15mg x3 in 24 hours 8am-8am    DNR on chart:  Allergies    Levaquin (Flushing)    Intolerances    MEDICATIONS  (STANDING):  dexAMETHasone     Tablet 4 milliGRAM(s) Oral every 6 hours  furosemide   Injectable 20 milliGRAM(s) IV Push daily  levETIRAcetam 500 milliGRAM(s) Oral two times a day  metoprolol succinate ER 25 milliGRAM(s) Oral daily  morphine ER Tablet 15 milliGRAM(s) Oral every 12 hours  pantoprazole    Tablet 40 milliGRAM(s) Oral before breakfast  senna 2 Tablet(s) Oral at bedtime    MEDICATIONS  (PRN):  oxyCODONE    IR 15 milliGRAM(s) Oral every 4 hours PRN Moderate Pain (4 - 6)  oxyCODONE    IR 20 milliGRAM(s) Oral every 4 hours PRN Severe Pain (7 - 10)      ITEMS UNCHECKED ARE NOT PRESENT    PRESENT SYMPTOMS: [ ]Unable to self-report - see [ ] CPOT [ ] PAINADS [ ] RDOS  Source if other than patient:  [ ]Family   [ ]Team     Pain: [x ]yes [ ]no  QOL impact - impacts ADLS  Location - epigastric region and right side of abdomen/flank region                   Aggravating factors - movement  Quality - throbbing  Radiation - none  Timing- constant   Severity (0-10 scale): 10  Minimal acceptable level (0-10 scale): 2-3      CPOT:    https://www.[facility] [email or web address]-Care-Pain-Observation-Tool-(CPOT)    Dyspnea:                           [ ]Mild [ ]Moderate [ ]Severe  Anxiety:                             [ ]Mild [ ]Moderate [ ]Severe  Fatigue:                             [ ]Mild [ ]Moderate [ ]Severe  Nausea:                             [ ]Mild [ ]Moderate [ ]Severe  Loss of appetite:              [ ]Mild [ ]Moderate [ ]Severe  Constipation:                    [ ]Mild [ ]Moderate [ ]Severe    PCSSQ[Palliative Care Spiritual Screening Question]   Severity (0-10):  Score of 4 or > indicate consideration of Chaplaincy referral.  Chaplaincy Referral: [ ] yes [x ] refused [ ] following [ ] Deferred     Caregiver [place]? : [ ] yes [x ] no [ ] Deferred [ ] Declined             Social work referral [ ] Patient & Family Centered Care Referral [ ]     Anticipatory Grief present?:  [ ] yes [x ] no  [ ] Deferred                  Social work referral [ ] Chaplaincy Referral[ ]      Other Symptoms:  [x ]All other review of systems negative   [ ]Unable to obtain due to poor mentation    Palliative Performance Status Version 2:   40      %      http://npcrc.org/files/news/palliative_performance_scale_ppsv2.pdf  PHYSICAL EXAM:  Vital Signs Last 24 Hrs  T(C): 36.6 (20 Sep 2022 04:24), Max: 36.6 (20 Sep 2022 04:24)  T(F): 97.8 (20 Sep 2022 04:24), Max: 97.8 (20 Sep 2022 04:24)  HR: 81 (20 Sep 2022 04:24) (70 - 81)  BP: 152/79 (20 Sep 2022 04:24) (128/78 - 152/79)  BP(mean): --  RR: 18 (20 Sep 2022 04:24) (18 - 18)  SpO2: 92% (20 Sep 2022 04:24) (92% - 94%)    Parameters below as of 20 Sep 2022 04:24  Patient On (Oxygen Delivery Method): room air     GENERAL:  [x]Alert  [x]Oriented x 3  [ ]Lethargic  [ ]Cachexia  [ ]Unarousable  [x]Verbal  [ ]Non-Verbal  Behavioral:   [ ]Anxiety  [ ]Delirium [ ]Agitation [ ]Other  HEENT:  [x]Normal   [ ]Dry mouth   [ ]ET Tube/Trach  [ ]Oral lesions  PULMONARY:   [x]Clear [ ]Tachypnea  [ ]Audible excessive secretions   [ ]Rhonchi        [ ]Right [ ]Left [ ]Bilateral  [ ]Crackles        [ ]Right [ ]Left [ ]Bilateral  [ ]Wheezing     [ ]Right [ ]Left [ ]Bilateral  [ ]Diminished BS [ ] Right [ ]Left [ ]Bilateral  CARDIOVASCULAR:    [x]Regular [ ]Irregular [ ]Tachy  [ ]Bobo [ ]Murmur [ ]Other  GASTROINTESTINAL:  [x]Soft  [ ]Distended   [x]+BS  [ ]Non tender [x ]Tender  [ ]PEG [ ]OGT/ NGT   Last BM:  No BM documented  GENITOURINARY:  [x]Normal [ ]Incontinent   [ ]Oliguria/Anuria   [ ]Shore  MUSCULOSKELETAL:   [ ]Normal   [x]Weakness  [ ]Bed/Wheelchair bound [ ]Edema  NEUROLOGIC:   [x]No focal deficits  [ ] Cognitive impairment  [ ] Dysphagia [ ]Dysarthria [ ] Paresis [ ]Other   SKIN:   [x]Normal  [ ]Rash   [ ]Pressure ulcer(s) [ ]y [ ]n present on admission    CRITICAL CARE:  [ ] Shock Present  [ ]Septic [ ]Cardiogenic [ ]Neurologic [ ]Hypovolemic  [ ]  Vasopressors [ ]  Inotropes   [ ]Respiratory failure present [ ]Mechanical ventilation [ ]Non-invasive ventilatory support [ ]High flow    [ ]Acute  [ ]Chronic [ ]Hypoxic  [ ]Hypercarbic [ ]Other  [ ]Other organ failure     LABS:                                   12.8   20.97 )-----------( 435      ( 20 Sep 2022 06:54 )             40.0   09-20    133<L>  |  92<L>  |  36<H>  ----------------------------<  123<H>  4.1   |  27  |  0.52    Ca    10.2      20 Sep 2022 06:54  Phos  2.8     09-19  Mg     2.2     09-19        RADIOLOGY & ADDITIONAL STUDIES: < from: MR Head w/wo IV Cont (09.14.22 @ 19:13) >  ACC: [id number] EXAM:  MR BRAIN WAW IC                          PROCEDURE DATE:  09/14/2022          INTERPRETATION:  CLINICAL INDICATION: Brain metastases. Altered mental   status.    TECHNIQUE: Multi-planar multi-sequential MR imaging of the brain was   performed before and after the intravenous administration of contrast.   7.5 ml of Gadavist IV contrast was administered.    COMPARISON: CT head 9/13/2022.    FINDINGS:  Numerous enhancing intracranial lesions throughout the bilateral cerebral   hemispheres and left cerebellum with surrounding edema. The largest   lesions measure 2.8 x 2.2 x 2.8 cm in the right occipital lobe (4-3), 2.7   x 2.0 x 2.4 cm in the right parietal lobe (4-13) and 2.4 x 1.7 x 1.6 cm   and the left inferior cerebellum (3-12). The left inferior cerebellar   lesion partially effaces the caudal aspect of the fourth ventricle and   left foramen of Luschka.    There is partial effacement of the right lateral ventricle secondary to   surrounding vasogenic edema. However there is mild prominence of the left   lateral ventricle with FLAIR hyperintense signal along the frontal and   occipital horns. Mild hydrocephalus with transependymal CSF flow cannot   be ruled out.     No acute infarct or intracranial hemorrhage identified. No extra-axial   fluid collections. The skull base flow voids are present.    The visualized intraorbital contents are normal. The imaged portions of   the paranasal sinuses are clear. The mastoid air cells are clear. The   visualized osseous structures, soft tissues and partially visualized   parotid glands appear normal.    IMPRESSION:    -Extensive intracranial metastatic disease with surrounding vasogenic   edema. Largest lesions are detailed above.    -Left inferior cerebellar lesion partially effaces the caudal aspect of   the fourth ventricle and left foramen Luschka, with possible associated   mild hydrocephalus.    --- End of Report ---            NIKKY LAM MD; Attending Radiologist  This document has been electronically signed. Sep 15 2022 10:08AM    < end of copied text >        Protein Calorie Malnutrition Present: [ ]mild [ ]moderate [ ]severe [ ]underweight [ ]morbid obesity  https://www.andeal.org/vault/2440/web/files/ONC/Table_Clinical%20Characteristics%20to%20Document%20Malnutrition-White%20JV%20et%20al%202012.pdf    Height (cm): 170.2 (09-16-22 @ 16:54)  Weight (kg): 55.3 (09-16-22 @ 16:54)  BMI (kg/m2): 19.1 (09-16-22 @ 16:54)    [ ]PPSV2 < or = 30%  [ ]significant weight loss [ ]poor nutritional intake [ ]anasarca[ ]Artificial Nutrition    Other REFERRALS:  [ ]Hospice  [ ]Child Life  [ ]Social Work  [ ]Case management [ ]Holistic Therapy     Goals of Care Document:EVA Knapp (09-15-22 @ 15:35)  Goals of Care Conversation:   Participants:  · Participants  Patient; Family; Staff  · Spouse  Mayank  · Child(cabrera)  daughter Alissa, son Cuong  · Relative  daughter in law Karrie  · Provider  Dr. Villagomez of Palliative  ·   Navin Royal LCSW    Advance Directives:  · Does patient have Advance Directive  No  · Does Patient Have a Surrogate  No  · Does the Patient have a Court Appointed Guardian (1750-B)  No  · Caregiver:  declines    Conversation Discussion:  · Conversation  Diagnosis; Prognosis; MOLST Discussed; Treatment Options  · Conversation Details  GAP consulted to assist in GOC and active pain/symptom management in the setting of patient with advanced illness. [place] and Dr. Villagomez met with patient and patient's family today for family meeting.    Team first introduced selves and roles in patient care. Patient and family verbalized understanding and were receptive to visit today. Team next inquired into patient understanding of current medical status, and patient demonstrates understanding of status. Patient states that she is aware that she has cancer and notes a functional change immediately prior to admission including weakness and AMS. Patient states that she was previously receiving OP treatment but had stopped approximately three weeks ago due to the effects the treatment was having on patient's status.     Team validated this overview today, and discussed plans for Oncology visit today and input regarding patient's status and next steps in care. Team inquired into patient goals at this time whilst patient and family awaits oncology input, and patient notes feeling uncertain if she would wish to undergo further treatment due to the experience she had prior to stopping treatment. Patient and family note wishing to hear more from oncology about options prior to making any further decisions in care, and team validated this again today.    Team lastly inquired into advance care planning, and inquired what patient might find to be acceptable in the event her heart were to stop. Patient states that she is unsure what she would find to be acceptable, but states she will consider the question further and will discuss amongst her family. Team encouraged this discussion and discussed plans for follow up regarding the topic.    Team lastly discussed patient's symptom burden and regimen that is recommended to assist in care. Patient and family verbalized agreement and understanding, and note that patient symptom management is a paramount concern at present.     GAP will continue to follow this case, and patient remains full code at this time.    What Matters Most To Patient and Family:  · What matters most to patient and family  Time spent with family, pain/symptom management    Personal Advance Directives Treatment Guidelines:   Treatment Guidelines:  · Decision Maker  Patient    Location of Discussion:   Time Spent on Advance Care Planning:  I spent 30 (in minutes) on advance care planning services with the patient.  This time is separate and distinct from any other care management services provided on this date.    Location of Discussion:  · Location of discussion  Face to face      Electronic Signatures:  Navin Royal ([facility] [place]  (Signed 15-Sep-2022 15:48)  	Authored: Goals of Care Conversation, Personal Advance Directives Treatment Guidelines, Location of Discussion  Lyn Villagomez)  (Signed 16-Sep-2022 11:20)  	Authored: Goals of Care Conversation      Last Updated: 16-Sep-2022 11:20 by Lyn Villagomez)

## 2022-09-20 NOTE — PROGRESS NOTE ADULT - ASSESSMENT
77 yo F with h/o colon CA with  mets, stated she was dx in 2021 and had radiation from Nov to Dec 2021. She then had infusional chemo till march 2022 and oral pills and then only oral pills. No chemo since siddhartha July/August 2022.  Admitted 9/13/22 with AMS x4 weeks, worsening over the last few days.   She also had  right sided rib pain for weeks as well and sob on exertion.  As  per , pt slipped and fell in the bathroom while getting up from the toilet 3 weeks ago.  Unsure if she had any LOC. Has had unsteady gait since.   Had  +intermittent episodes of diarrhea  She has been found to have brain mets, extensive metastatic disease, No cord compression on MRI spine, pulmonary embolism and DVT.   IVC filter done 9/16/22.  She is on Decadron, keppra and pain meds.  Prognosis is extremely poor.   Extensive brain mets makes her have very poor prognosis in addition to high volume metastatic disease with prior treatments and poor PS.  I had extensive discussion with patient's daughter. She confirmed the history of rectal cancer given by her mother. She stated mets in lung and liver were found after she received RT with chemo pills.  I spoke to Dr. Tawanna West. She had previous treatments with FOLFOX Avastin too and was started on Stivarga recently which she did not tolerate well. She thought patient should get Rt and be palliative care.   RR note noted regarding RT.  Suggest RT as planned, follow up with Dr. Tawanna Goncalves for further discussion. Palliative care has seen here also

## 2022-09-20 NOTE — DISCHARGE NOTE PROVIDER - CARE PROVIDER_API CALL
Sandee Méndez  HEMATOLOGY  [address], [address]  [place] [address]  Phone: [phone]  Fax: [phone]  Follow Up Time:     Timothy Tran)  Radiation Oncology  [address] [facility], [address]tr[address] [place] [address]  Phone: [phone]  Fax: [phone]  Follow Up Time:     Lyn Villagomez)  Family Medicine  [address] [place] [address]  Phone: [phone]  Fax: [phone]  Follow Up Time:    Sandee Méndez  HEMATOLOGY  [address] [facility], [address]  [place] [address]  Phone: [phone]  Fax: [phone]  Follow Up Time: 1 week    Timothy Tran)  Radiation Oncology  [address] [facility], [address]tr[address] A- [place] [address]  Phone: [phone]  Fax: [phone]  Follow Up Time: 1 week    Suni Kelly)  Internal Medicine  [address] [facility], [address]  [place] [address]  Phone: [phone]  Fax: [phone])-  Follow Up Time: 1 week   Sandee Méndez  HEMATOLOGY  [address] [facility], [address]  [place] [address]  Phone: [phone]  Fax: [phone]  Follow Up Time: 1 week    Timothy Tran)  Radiation Oncology  [address] [facility], [address]tr[address] A- [place] [address]  Phone: [phone]  Fax: [phone]  Follow Up Time: 1 week    Suni Kelly)  Internal Medicine  [address] [facility] [address]  [place] [address]  Phone: [phone]  Fax: [phone])-  Follow Up Time: 1 week    Alfonso Nathan)  Neurosurgery  [address] [facility], [address]  [place] [address]  Phone: [phone]  Fax: [phone]  Follow Up Time: 1 week   Sandee Méndez  HEMATOLOGY  [address] [facility], [address]  [place] [address]  Phone: [phone]  Fax: [phone]  Follow Up Time:     Timothy Tran)  Radiation Oncology  [address] [facility], Entr[address] [place]- [place] [address]  Phone: [phone]  Fax: [phone]  Follow Up Time:     Suni Kelly)  Internal Medicine  [address] [facility], [address]  [place] [address]  Phone: [phone]  Fax: [phone]  Scheduled Appointment: 09/28/2022 10:30 AM    Alfonso Nathan)  Neurosurgery  [address] [facility], [address]  [place] [address]  Phone: [phone]  Fax: [phone]  Follow Up Time:

## 2022-09-20 NOTE — DISCHARGE NOTE PROVIDER - HOSPITAL COURSE
· Assessment	  77 yo F        h/o colon CA with  mets    no longer on oral chemo x  past  1 mo     p/w AMS x4 weeks, worsening over the last few days.  had  c/o right sided rib pain for weeks  and .sob  on exertion x days.     pmd  dr coffman   sent pt in ,  presented to her with AMS, worsening weakness and lethargy.     Per , pt slipped and fell in the bathroom while getting up from the toilet 3 weeks ago.       admitted   with  ams.  from cerebral   mets  with bleed.  able to  converse   h/o  metastatic Ca colon, oncologist dr jose enrique ignacio   N/S  eval, no intervention,   pt has no focal weakness   CT  chest angio. :  ,Pe, b/l pl effusions, hepatic mets /   T4 comp fx, with bony retropulsion,     TLSO  brace  per  N/S     pt  with +   PE/ R  leg  DVT,   unable to  a/c pt , given cerebral bleed/ family  aware of risks   wbc  noted. from malignancy  afebrile , , fall risk/ PT eval   oncology dr bullock  ,  on Decadron  for  h'gic mets, seen by  N/S         aware of  poor prognosis.  family   had  meeting  with palliative care, on 9/ 15,  remains  full code    s/p   IVC filter  by  IR  rx  plan per  dr bullock  is  palliative  in nature,  RT  to  brain / agree  with  oncology  that,  pt  ideal  candidate  for  comfort  care/  hospice  spoke with  daughter ,  pt remains  full  code  pain meds per  palliative  care team/   continue  current care  per  daughter   will  decide   on further   trajectory  after  meeting with  consultants    pe  N/S,   and RT  dr ferrari,  gamma knife  rt  a s an out pt  on Decadron/    on  morphine/  oxy per palliative team   pt  remains  bed bouind/   functional  paraplegia,     given that family till now  has refused  palliative/   hospice   care., pt is  at increased  risk  for  re  admissions    daughter  wishes   pt to  go home/ care cotn  to  f/ p   · · Assessment	  75 yo F        h/o colon CA with  mets    no longer on oral chemo x  past  1 mo     p/w AMS x4 weeks, worsening over the last few days.  had  c/o right sided rib pain for weeks  and .sob  on exertion x days.     pmd  dr coffman   sent pt in ,  presented to her with AMS, worsening weakness and lethargy.     Per , pt slipped and fell in the bathroom while getting up from the toilet 3 weeks ago.       admitted   with  ams.  from cerebral   mets  with bleed.  able to  converse   h/o  metastatic Ca colon, oncologist dr jose enrique ignacio   N/S  eval, no intervention,   pt has no focal weakness   CT  chest angio. :  ,Pe, b/l pl effusions, hepatic mets /   T4 comp fx, with bony retropulsion,     TLSO  brace  per  N/S     pt  with +   PE/ R  leg  DVT,   unable to  a/c pt , given cerebral bleed/ family  aware of risks, s/p  IVC filter   wbc  noted. from malignancy  afebrile , , fall risk/ PT eval   oncology dr bullock  ,  on Decadron  for  h'gic mets, seen by  N/S         aware of  poor prognosis.  family   had  2  meetings  with palliative care,, and,  pt  remains  full code  rx  plan per  dr bullock  is  palliative  in nature,  RT  to  brain / agree  with  oncology  that,  pt  ideal  candidate  for  comfort  care/  hospice  spoke with  daughter ,  pt remains  full  code   per   N/S,  gammaknife   Rt,  a s an out pt     elevated  wbc  from decadron. pt is  afberile      daughter   wants  all done/  rpt ct  head  was  stable from 9/ 22    spoke  at length  with spouse  and daughter, clearly  stated  that  pt si  full code. and   that  family does not wish to pursue  any  more  meetings  and,   this  was  confirmed  by  conversation between   vahe lucio   and  daughter  also    and  dr bullock  has  spoken at length with daughter / daughter and  Rad  onc will  decide  on RT   daughter  has stated, that  per  Rad onc , Gamma knife   RT can only be  done as  an out pt  and hence , no need  for  transfer  and  also, she does  not want WBRT   pt  has improved, upon lowering pain meds. more  alert and  daughter in agreement  on  decdron, keppra, torpol, oxycodone  daughter  wants rehab/  may start   d/c planning   on prn pain meds  at  lower  dose. pt  is alert/  on po feeds           · · Assessment	  77 yo F        h/o colon CA with  mets    no longer on oral chemo x  past  1 mo     p/w AMS x4 weeks, worsening over the last few days.  had  c/o right sided rib pain for weeks  and .sob  on exertion x days.     pmd  dr coffman   sent pt in ,  presented to her with AMS, worsening weakness and lethargy.     Per , pt slipped and fell in the bathroom while getting up from the toilet 3 weeks ago.       admitted   with  ams.  from cerebral   mets  with bleed.  able to  converse   h/o  metastatic Ca colon, oncologist dr jose enrique ignacio   N/S  eval, no intervention,   pt has no focal weakness   CT  chest angio. :  ,Pe, b/l pl effusions, hepatic mets /   T4 comp fx, with bony retropulsion,     TLSO  brace  per  N/S     pt  with +   PE/ R  leg  DVT,   unable to  a/c pt , given cerebral bleed/ family  aware of risks, s/p  IVC filter   wbc  noted. from malignancy  afebrile , , fall risk/ PT eval   oncology dr bullock  ,  on Decadron  for  h'gic mets, seen by  N/S         aware of  poor prognosis.  family   had  2  meetings  with palliative care,, and,  pt  remains  full code  rx  plan per  dr bullock  is  palliative  in nature,  RT  to  brain / agree  with  oncology  that,  pt  ideal  candidate  for  comfort  care/  hospice  spoke with  daughter ,  pt remains  full  code   per   N/S,  gammaknife   Rt,  a s an out pt     elevated  wbc  from decadron. pt is  afberile      daughter   wants  all done/  rpt ct  head  was  stable from 9/ 22    spoke  at length  with spouse  and daughter, clearly  stated  that  pt si  full code. and   that  family does not wish to pursue  any  more  meetings  and,   this  was  confirmed  by  conversation between   vahe lucio   and  daughter  also    and  dr bullock  has  spoken at length with daughter / daughter and  Rad  onc will  decide  on RT   daughter  has stated, that  per  Rad onc , Gamma knife   RT can only be  done as  an out pt  and hence , no need  for  transfer  and  also, she does  not want WBRT   pt  has improved, upon lowering pain meds. more  alert and  daughter in agreement  on  decdron, keppra, torpol, oxycodone  daughter  wants rehab/  may start   d/c planning   on prn pain meds  at  lower  dose. pt  is alert/  on po feeds     daughetr now  deciding on home  vs rehab   pmd  alex coffman

## 2022-09-20 NOTE — PROGRESS NOTE ADULT - PROBLEM SELECTOR PLAN 4
GOC discussion ongoing  Pt remains full code Plan is for outpt RT and f/u with oncologist  Pt remains full code. Ongoing discussion regarding code status with patient and family.  Pt reports daughter Alissa is HCP. Will need to obtain copy of HCP form.

## 2022-09-20 NOTE — DISCHARGE NOTE PROVIDER - NPI NUMBER (FOR SYSADMIN USE ONLY) :
[[id number]],[[id number]],[[id number]] [[id number]],[[id number]],[[id number]] [[id number]],[[id number]]

## 2022-09-20 NOTE — DISCHARGE NOTE PROVIDER - CARE PROVIDERS DIRECT ADDRESSES
,DirectAddress_Unknown,[email or web address],DirectAddress_Unknown ,DirectAddress_Unknown,[email or web address] ,[email or web address],[email or web address]".[facility]

## 2022-09-20 NOTE — DISCHARGE NOTE PROVIDER - NSDCHHNEEDSERVICE_GEN_ALL_CORE
Rehabilitation services Medication teaching and assessment/Rehabilitation services/Teaching and training Medication teaching and assessment/Rehabilitation services/Teaching and training/Other, specify...

## 2022-09-20 NOTE — PROGRESS NOTE ADULT - SUBJECTIVE AND OBJECTIVE BOX
CARDIOLOGY     PROGRESS  NOTE   ________________________________________________    CHIEF COMPLAINT:Patient is a 76y old  Female who presents with a chief complaint of ams/ sob (19 Sep 2022 15:34)  sleeping.  	  REVIEW OF SYSTEMS:  CONSTITUTIONAL: No fever, weight loss, or fatigue  EYES: No eye pain, visual disturbances, or discharge  ENT:  No difficulty hearing, tinnitus, vertigo; No sinus or throat pain  NECK: No pain or stiffness  RESPIRATORY: No cough, wheezing, chills or hemoptysis; No Shortness of Breath  CARDIOVASCULAR: No chest pain, palpitations, passing out, dizziness, or leg swelling  GASTROINTESTINAL: No abdominal or epigastric pain. No nausea, vomiting, or hematemesis; No diarrhea or constipation. No melena or hematochezia.  GENITOURINARY: No dysuria, frequency, hematuria, or incontinence  NEUROLOGICAL: No headaches, memory loss, loss of strength, numbness, or tremors  SKIN: No itching, burning, rashes, or lesions   LYMPH Nodes: No enlarged glands  ENDOCRINE: No heat or cold intolerance; No hair loss  MUSCULOSKELETAL: No joint pain or swelling; No muscle, back, or extremity pain  PSYCHIATRIC: No depression, anxiety, mood swings, or difficulty sleeping  HEME/LYMPH: No easy bruising, or bleeding gums  ALLERGY AND IMMUNOLOGIC: No hives or eczema	    [ ] All others negative	  [ x] Unable to obtain    PHYSICAL EXAM:  T(C): 36.6 (09-20-22 @ 04:24), Max: 36.6 (09-20-22 @ 04:24)  HR: 81 (09-20-22 @ 04:24) (70 - 81)  BP: 152/79 (09-20-22 @ 04:24) (128/78 - 152/79)  RR: 18 (09-20-22 @ 04:24) (18 - 18)  SpO2: 92% (09-20-22 @ 04:24) (92% - 95%)  Wt(kg): --  I&O's Summary    19 Sep 2022 07:01  -  20 Sep 2022 07:00  --------------------------------------------------------  IN: 240 mL / OUT: 250 mL / NET: -10 mL        Appearance: Normal	  HEENT:   Normal oral mucosa, PERRL, EOMI	  Lymphatic: No lymphadenopathy  Cardiovascular: Normal S1 S2, No JVD, + murmurs, No edema  Respiratory: rhonchi  Gastrointestinal:  Soft, Non-tender, + BS	  Skin: No rashes, No ecchymoses, No cyanosis	  Neurologic: Non-focal  Extremities: Normal range of motion, No clubbing, cyanosis or edema  Vascular: Peripheral pulses palpable 2+ bilaterally    MEDICATIONS  (STANDING):  dexAMETHasone     Tablet 4 milliGRAM(s) Oral every 6 hours  furosemide   Injectable 20 milliGRAM(s) IV Push daily  levETIRAcetam 500 milliGRAM(s) Oral two times a day  metoprolol succinate ER 25 milliGRAM(s) Oral daily  morphine ER Tablet 15 milliGRAM(s) Oral every 12 hours  pantoprazole    Tablet 40 milliGRAM(s) Oral before breakfast  polyethylene glycol 3350 17 Gram(s) Oral daily  senna 2 Tablet(s) Oral at bedtime      TELEMETRY: 	    ECG:  	  RADIOLOGY:  OTHER: 	  	  LABS:	 	    CARDIAC MARKERS:                                12.8   20.97 )-----------( 435      ( 20 Sep 2022 06:54 )             40.0     09-20    133<L>  |  92<L>  |  36<H>  ----------------------------<  123<H>  4.1   |  27  |  0.52    Ca    10.2      20 Sep 2022 06:54  Phos  2.8     09-19  Mg     2.2     09-19      proBNP: Serum Pro-Brain Natriuretic Peptide: 2287 pg/mL (09-13 @ 18:51)    Lipid Profile:   HgA1c:   TSH:         Assessment and plan  ---------------------------  77 yo F h/o colon CA with  mets was on   chemo ,  no longer on oral chemo x  past  1 mo p/w AMS x4 weeks, worsening over the last few days.     had also been c/o right sided rib pain for weeks as well.sob  on exertion x days.     pmd  d r brand   sent pt in for further evaluation as pt presented to her with AMS, worsening weakness and lethargy.     Per , pt slipped and fell in the bathroom while getting up from the toilet 3 weeks ago.    Unsure if she had any LOC. Has had unsteady gait since.      +intermittent episodes of diarrhea     Denies nausea, vomiting, fever, chills, cough, chest pain, blood in stool, urinary complaints, headache.   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  AC contraindicated  pt needs IVC filter any way despite doppler  check le venous doppler awaiting  echo in er noted with normal ef and bl pleural effusion with increase pro bnp  dc ivf  palliative care/ onc  noted  increase beta blocker for bp control, controlled now  PE on no AC sec to brain hemorrhagic mets  ?palliative care

## 2022-09-20 NOTE — PROGRESS NOTE ADULT - SUBJECTIVE AND OBJECTIVE BOX
Pt seen and examined.  Awake, alert BAUTISTA  MRI shows metastatic disease with intracerebral mets x 7    I spoke with pt and her daughter Alissa by phone regarding treatment as an outpt with [facility]. I discussed the risks, benefits, inidcations and alternatives with her daughter. She understands and wants her mother to proceed with treatment. Will arrange for consult with Dr. Tran and then scheduling of [facility] S[facility]

## 2022-09-20 NOTE — DISCHARGE NOTE PROVIDER - PROVIDER TOKENS
PROVIDER:[TOKEN:[[id number]]],PROVIDER:[TOKEN:[[id number]]],PROVIDER:[TOKEN:[[id number]]] PROVIDER:[TOKEN:[[id number]],FOLLOWUP:[1 week]],PROVIDER:[TOKEN:[[id number]],FOLLOWUP:[1 week]],PROVIDER:[TOKEN:[[id number]],FOLLOWUP:[1 week]] PROVIDER:[TOKEN:[[id number]],FOLLOWUP:[1 week]],PROVIDER:[TOKEN:[[id number]],FOLLOWUP:[1 week]],PROVIDER:[TOKEN:[[id number]],FOLLOWUP:[1 week]],PROVIDER:[TOKEN:[[id number]],FOLLOWUP:[1 week]] PROVIDER:[TOKEN:[[id number]]],PROVIDER:[TOKEN:[[id number]]],PROVIDER:[TOKEN:[[id number]],SCHEDULEDAPPT:[09/28/2022],SCHEDULEDAPPTTIME:[10:30 AM]],PROVIDER:[TOKEN:[[id number]]]

## 2022-09-20 NOTE — DISCHARGE NOTE PROVIDER - NSDCCAREPROVSEEN_GEN_ALL_CORE_FT
Jovanni, Karthik Nathan, Alfonso Chi, Dayday Ghotra, Mayda Lopez, Zulma Méndez, Sandee Villagomez, Lyn

## 2022-09-20 NOTE — PROGRESS NOTE ADULT - SUBJECTIVE AND OBJECTIVE BOX
75 yo F with h/o colon CA with  mets was on  oral chemo, but not for past 1 mo  Admitted 9/13/22 with AMS x4 weeks, worsening over the last few days.   She also had  right sided rib pain for weeks as well and sob on exertion.  As  per , pt slipped and fell in the bathroom while getting up from the toilet 3 weeks ago.  Unsure if she had any LOC. Has had unsteady gait since.   Had  +intermittent episodes of diarrhea  Denied nausea, vomiting, fever, chills, cough, chest pain, blood in stool, urinary complaints, headache.   Followed for metastatic colon cancer and brain mets    PAST MEDICAL & SURGICAL HISTORY:  COPD (chronic obstructive pulmonary disease)  mild      Colon cancer      History of cholecystectomy  2000      Bone spur  Lt foot 5th digit 2014      H/O bilateral breast biopsy  2002      S/P tonsillectomy  childhood        Allergies    Levaquin ([place])    Intolerances      Social History:    Medications:  dexAMETHasone     Tablet 4 milliGRAM(s) Oral every 6 hours  furosemide   Injectable 20 milliGRAM(s) IV Push daily  levETIRAcetam 500 milliGRAM(s) Oral two times a day  metoprolol succinate ER 25 milliGRAM(s) Oral daily  morphine ER Tablet 15 milliGRAM(s) Oral every 12 hours  oxyCODONE    IR 15 milliGRAM(s) Oral every 4 hours PRN Moderate Pain (4 - 6)  oxyCODONE    IR 20 milliGRAM(s) Oral every 4 hours PRN Severe Pain (7 - 10)  pantoprazole    Tablet 40 milliGRAM(s) Oral before breakfast  polyethylene glycol 3350 17 Gram(s) Oral daily  senna 2 Tablet(s) Oral at bedtime    Labs:  CBC Full  -  ( 20 Sep 2022 06:54 )  WBC Count : 20.97 K/uL  RBC Count : 4.53 M/uL  Hemoglobin : 12.8 g/dL  Hematocrit : 40.0 %  Platelet Count - Automated : 435 K/uL  Mean Cell Volume : 88.3 fl  Mean Cell Hemoglobin : 28.3 pg  Mean Cell Hemoglobin Concentration : 32.0 gm/dL  Auto Neutrophil # : x  Auto Lymphocyte # : x  Auto Monocyte # : x  Auto Eosinophil # : x  Auto Basophil # : x  Auto Neutrophil % : x  Auto Lymphocyte % : x  Auto Monocyte % : x  Auto Eosinophil % : x  Auto Basophil % : x    09-20    133<L>  |  92<L>  |  36<H>  ----------------------------<  123<H>  4.1   |  27  |  0.52    Ca    10.2      20 Sep 2022 06:54  Phos  2.8     09-19  Mg     2.2     09-19        Radiology:             ROS:  Patient comfortable without distress  No SOB or chest pain  No palpitation  No abdominal pain, diarrhaea or constipation  No weakness of extremities  No skin changes or swelling of legs  Rest of the comprehensive ROS was negative  Vital Signs Last 24 Hrs  T(C): 36.6 (20 Sep 2022 04:24), Max: 36.6 (20 Sep 2022 04:24)  T(F): 97.8 (20 Sep 2022 04:24), Max: 97.8 (20 Sep 2022 04:24)  HR: 81 (20 Sep 2022 04:24) (70 - 81)  BP: 152/79 (20 Sep 2022 04:24) (128/78 - 152/79)  BP(mean): --  RR: 18 (20 Sep 2022 04:24) (18 - 18)  SpO2: 92% (20 Sep 2022 04:24) (92% - 95%)    Parameters below as of 20 Sep 2022 04:24  Patient On (Oxygen Delivery Method): room air        Physical exam:  Patient alert and oriented  No distress, cachectic  CVS: S1, S2   Chest: bilateral breath sound without rales  Abdomen: soft, not tender, no organomegaly or masses  CNS: No focal neuro deficit  Musculoskeletal:  Normal range of motion  Skin: No rash    Assessment and Plan:

## 2022-09-20 NOTE — DISCHARGE NOTE PROVIDER - NSDCMRMEDTOKEN_GEN_ALL_CORE_FT
Caltrate 600 mg oral tablet: orally once a day  Centrum oral tablet: 1 tab(s) orally once a day  meloxicam 7.5 mg oral tablet: 1 tab(s) orally once a day, As Needed  oxyCODONE 15 mg oral tablet: 1 tab(s) orally 5 times a day for 15 days  Symmetry Medical Marijuana 2.5 mg THC/2.5 mg CBD/0.5 mL:  0.25 - 0.5 milliliter(s) orally every 4 to 5 hours as needed  tiZANidine 4 mg oral tablet: 1  orally every 6 hours, As Needed  Vitamin B-12: orally once a day   dexamethasone 4 mg oral tablet: 1 tab(s) orally every 6 hours  furosemide 20 mg oral tablet: 1 tab(s) orally once a day  levETIRAcetam 500 mg oral tablet: 1 tab(s) orally 2 times a day  metoprolol succinate 25 mg oral tablet, extended release: 1 tab(s) orally once a day  morphine 15 mg/8 to 12 hr oral tablet, extended release: 1 tab(s) orally every 12 hours  oxyCODONE 15 mg oral tablet: 1 tab(s) orally every 4 hours, As needed, Moderate Pain (4 - 6)  oxyCODONE 20 mg oral tablet: 1 tab(s) orally every 4 hours, As needed, Severe Pain (7 - 10)  pantoprazole 40 mg oral delayed release tablet: 1 tab(s) orally once a day (before a meal)  polyethylene glycol 3350 oral powder for reconstitution: 17 gram(s) orally once a day  Rehab at home:   senna leaf extract oral tablet: 2 tab(s) orally once a day (at bedtime)   dexamethasone 4 mg oral tablet: 1 tab(s) orally every 6 hours  dexamethasone 4 mg oral tablet: 1 tab(s) orally every 6 hours  furosemide 20 mg oral tablet: 1 tab(s) orally once a day  levETIRAcetam 500 mg oral tablet: 1 tab(s) orally 2 times a day  metoprolol succinate 25 mg oral tablet, extended release: 1 tab(s) orally once a day  morphine 15 mg/8 to 12 hr oral tablet, extended release: 1 tab(s) orally every 12 hours MDD:2  morphine 15 mg/8 to 12 hr oral tablet, extended release: 1 tab(s) orally every 12 hours  oxyCODONE 15 mg oral tablet: 1 tab(s) orally every 4 hours, As needed, Moderate Pain (4 - 6)  oxyCODONE 15 mg oral tablet: 1 tab(s) orally every 4 hours, As needed, Moderate Pain (4 - 6) MDD:6  oxyCODONE 20 mg oral tablet: 1 tab(s) orally every 4 hours, As needed, Severe Pain (7 - 10) MDD:6  oxyCODONE 20 mg oral tablet: 1 tab(s) orally every 4 hours, As needed, Severe Pain (7 - 10)  pantoprazole 40 mg oral delayed release tablet: 1 tab(s) orally once a day (before a meal)  polyethylene glycol 3350 oral powder for reconstitution: 17 gram(s) orally once a day  senna leaf extract oral tablet: 2 tab(s) orally once a day (at bedtime)   dexamethasone 4 mg oral tablet: 1 tab(s) orally every 6 hours  furosemide 20 mg oral tablet: 1 tab(s) orally once a day  levETIRAcetam 500 mg oral tablet: 1 tab(s) orally 2 times a day  metoprolol succinate 25 mg oral tablet, extended release: 1 tab(s) orally once a day  morphine 15 mg/8 to 12 hr oral tablet, extended release: 1 tab(s) orally every 12 hours MDD:2  naloxone 4 mg/0.1 mL nasal spray: Spray 0.1 mL into one nostril. Repeat with second device into other nostril after 2-3 minutes if no or minimal response  oxyCODONE 15 mg oral tablet: 1 tab(s) orally every 4 hours, As needed, Moderate Pain (4 - 6) MDD:6  oxyCODONE 20 mg oral tablet: 1 tab(s) orally every 4 hours, As needed, Severe Pain (7 - 10) MDD:6  pantoprazole 40 mg oral delayed release tablet: 1 tab(s) orally once a day (before a meal)  polyethylene glycol 3350 oral powder for reconstitution: 17 gram(s) orally once a day  senna leaf extract oral tablet: 2 tab(s) orally once a day (at bedtime)   levETIRAcetam 500 mg oral tablet: 1 tab(s) orally 2 times a day  metoprolol succinate 25 mg oral tablet, extended release: 1 tab(s) orally once a day  morphine 15 mg/8 to 12 hr oral tablet, extended release: 1 tab(s) orally every 12 hours MDD:2  naloxone 4 mg/0.1 mL nasal spray: Spray 0.1 mL into one nostril. Repeat with second device into other nostril after 2-3 minutes if no or minimal response  oxyCODONE 15 mg oral tablet: 1 tab(s) orally every 4 hours, As needed, Moderate Pain (4 - 6) MDD:6  oxyCODONE 20 mg oral tablet: 1 tab(s) orally every 4 hours, As needed, Severe Pain (7 - 10) MDD:6  pantoprazole 40 mg oral delayed release tablet: 1 tab(s) orally once a day (before a meal)  polyethylene glycol 3350 oral powder for reconstitution: 17 gram(s) orally once a day  senna leaf extract oral tablet: 2 tab(s) orally once a day (at bedtime)   bisacodyl 5 mg oral delayed release tablet: 1 tab(s) orally once a day (at bedtime)  levETIRAcetam 500 mg oral tablet: 1 tab(s) orally 2 times a day  metoprolol succinate 25 mg oral tablet, extended release: 1 tab(s) orally once a day  morphine 15 mg/8 to 12 hr oral tablet, extended release: 1 tab(s) orally every 12 hours MDD:2  naloxone 4 mg/0.1 mL nasal spray: Spray 0.1 mL into one nostril. Repeat with second device into other nostril after 2-3 minutes if no or minimal response  oxyCODONE 15 mg oral tablet: 1 tab(s) orally every 4 hours, As needed, Moderate Pain (4 - 6) MDD:6  oxyCODONE 20 mg oral tablet: 1 tab(s) orally every 4 hours, As needed, Severe Pain (7 - 10) MDD:6  pantoprazole 40 mg oral delayed release tablet: 1 tab(s) orally once a day (before a meal)  polyethylene glycol 3350 oral powder for reconstitution: 17 gram(s) orally once a day  senna leaf extract oral tablet: 2 tab(s) orally once a day (at bedtime)   bisacodyl 5 mg oral delayed release tablet: 1 tab(s) orally once a day (at bedtime)  hosptial bed:   levETIRAcetam 500 mg oral tablet: 1 tab(s) orally 2 times a day  metoprolol succinate 25 mg oral tablet, extended release: 1 tab(s) orally once a day  morphine 15 mg/8 to 12 hr oral tablet, extended release: 1 tab(s) orally every 12 hours MDD:2  naloxone 4 mg/0.1 mL nasal spray: Spray 0.1 mL into one nostril. Repeat with second device into other nostril after 2-3 minutes if no or minimal response  oxyCODONE 15 mg oral tablet: 1 tab(s) orally every 4 hours, As needed, Moderate Pain (4 - 6) MDD:6  oxyCODONE 20 mg oral tablet: 1 tab(s) orally every 4 hours, As needed, Severe Pain (7 - 10) MDD:6  pantoprazole 40 mg oral delayed release tablet: 1 tab(s) orally once a day (before a meal)  polyethylene glycol 3350 oral powder for reconstitution: 17 gram(s) orally once a day  rolling walker:   senna leaf extract oral tablet: 2 tab(s) orally once a day (at bedtime)

## 2022-09-20 NOTE — DISCHARGE NOTE PROVIDER - NSDCCPCAREPLAN_GEN_ALL_CORE_FT
PRINCIPAL DISCHARGE DIAGNOSIS  Diagnosis: AMS (altered mental status)  Assessment and Plan of Treatment:       SECONDARY DISCHARGE DIAGNOSES  Diagnosis: Metastatic cancer to brain  Assessment and Plan of Treatment:      PRINCIPAL DISCHARGE DIAGNOSIS  Diagnosis: AMS (altered mental status)  Assessment and Plan of Treatment: Baseline      SECONDARY DISCHARGE DIAGNOSES  Diagnosis: Metastatic cancer to brain  Assessment and Plan of Treatment: MRI shows metastatic disease with intracerebral mets x 7  Follow up with Neurosurgery  Follow up with Radiation Oncology    Diagnosis: Primary colon cancer with metastasis to other site  Assessment and Plan of Treatment: Follow up with Oncologist    Diagnosis: Compression fracture of T4 vertebra  Assessment and Plan of Treatment: TLSO brace when OOB  Pain control as needed    Diagnosis: Right leg DVT  Assessment and Plan of Treatment: IVC filter placed 9/16     PRINCIPAL DISCHARGE DIAGNOSIS  Diagnosis: AMS (altered mental status)  Assessment and Plan of Treatment: Baseline      SECONDARY DISCHARGE DIAGNOSES  Diagnosis: Metastatic cancer to brain  Assessment and Plan of Treatment: MRI shows metastatic disease with intracerebral mets x 7  Follow up with Neurosurgery  Follow up with Radiation Oncology    Diagnosis: Primary colon cancer with metastasis to other site  Assessment and Plan of Treatment: Follow up with Oncologist    Diagnosis: Compression fracture of T4 vertebra  Assessment and Plan of Treatment: TLSO brace when OOB  Pain control as needed  Physical therapy    Diagnosis: Right leg DVT  Assessment and Plan of Treatment: IVC filter placed 9/16

## 2022-09-20 NOTE — PROGRESS NOTE ADULT - ASSESSMENT
77 yo F        h/o colon CA with  mets    no longer on oral chemo x  past  1 mo     p/w AMS x4 weeks, worsening over the last few days.  had  c/o right sided rib pain for weeks  and .sob  on exertion x days.     pmd  dr coffman   sent pt in ,  presented to her with AMS, worsening weakness and lethargy.     Per , pt slipped and fell in the bathroom while getting up from the toilet 3 weeks ago.       admitted   with  ams.  from cerebral   mets  with bleed.  able to  converse   h/o  metastatic Ca colon, oncologist dr jose enrique ignacio   N/S  eval, no intervention,   pt has no focal weakness   CT  chest angio. :  ,Pe, b/l pl effusions, hepatic mets /   T4 comp fx, with bony retropulsion,     TLSO  brace  per  N/S     pt  with +   PE/ R  leg  DVT,   unable to  a/c pt , given cerebral bleed/ family  aware of risks   wbc  noted. from malignancy  afebrile , , fall risk/ PT eval   oncology dr bullock  ,  on Decadron  for  h'gic mets, seen by  N/S         aware of  poor prognosis.  family   had  meeting  with palliative care, on 9/ 15,  remains  full code    s/p   IVC filter  by  IR  rx  plan per  dr bullock  is  palliative  in nature,  RT  to  brain / agree  with  oncology  that,  pt  ideal  candidate  for  comfort  care/  hospice  spoke with  daughter ,  pt remains  full  code  pain meds per  palliative  care team/   continue  current care  per  daughter   will  decide   on further   trajectory  after  meeting with  consultants    pe  N/S,  gammaknife  rt  a s an out pt  on dcedron/  morphine         rad< from: CT Head No Cont (09.13.22 @ 18:46) >  RESSION:  Multiple hyperdense nodules are may represent hemorrhagic metastasis in a   patient with history of colon carcinoma. Further evaluation with contrast   enhanced brain MRI is recommended.  No acute intracranial hemorrhage.  --- End of Report ---       RAD< from: CT Abdomen and Pelvis w/ IV Cont (09.13.22 @ 18:48) >  IMPRESSION:  Segmental and subsegmental acute pulmonary arterial emboli as detailed   above. Presence of pulmonary embolism was discussed with Dr. Aguirre by Dr. Orta on September 13, 2022 at 8:03 PM.  Extensive metastatic disease as detailed above.  Small multiloculated bilateral pleural effusions.  Compression fracture deformity of the T4 vertebral body with some   retropulsion of the fractured vertebral body into the spinal canal.   Dedicated thoracic spine MRI can be performed for complete evaluation as   clinically warranted.  --- End of Report ---

## 2022-09-20 NOTE — DISCHARGE NOTE PROVIDER - NSDCFUSCHEDAPPT_GEN_ALL_CORE_FT
Wallace Perea  [facility]  INTERVEN [address]  Scheduled Appointment: 10/17/2022     Suni Kelly  [facility]  GERIATRICS [address]   Scheduled Appointment: 09/28/2022    Alfonso Nathan  [facility]  SPINECTR GUZMAN [address] [facility]  Scheduled Appointment: 10/04/2022    [facility]  MRI  [address]  Scheduled Appointment: 10/04/2022    Wallace Perea  [facility]  INTERVEN [address]  Scheduled Appointment: 10/17/2022     Timothy Tran  [facility]  RADMED [address] [facility]  Scheduled Appointment: 09/27/2022    Suni Kelly  [facility]  GERIATRICS [address]   Scheduled Appointment: 09/28/2022    Alfonso Nathan  [facility]  SPINECTR [address] [facility]  Scheduled Appointment: 10/04/2022    [facility]  MRI  [address]  Scheduled Appointment: 10/04/2022    Wallace Perea  [facility]  INTERVEN [address]  Scheduled Appointment: 10/17/2022     Suni Kelly  [facility]  GERIATRICS [address]   Scheduled Appointment: 09/28/2022    Alfonso Nathan  [facility]  SPINECTR [address] [facility]  Scheduled Appointment: 10/04/2022    [facility]  MRI  [address]  Scheduled Appointment: 10/04/2022    Wallace Perea  [facility]  INTERVEN [address]  Scheduled Appointment: 10/17/2022

## 2022-09-20 NOTE — PROGRESS NOTE ADULT - TIME BILLING
education regarding symptom management and coordination of care with patient, family and primary team

## 2022-09-20 NOTE — PROGRESS NOTE ADULT - SUBJECTIVE AND OBJECTIVE BOX
afberile    REVIEW OF SYSTEMS:  GEN: no fever,    no chills  RESP: no SOB,   no cough  CVS: no chest pain,   no palpitations  GI: no abdominal pain,   no nausea,   no vomiting,   no constipation,   no diarrhea  : no dysuria,   no frequency  NEURO: no headache,   no dizziness  PSYCH: no depression,   not anxious  Derm : no rash    MEDICATIONS  (STANDING):  dexAMETHasone     Tablet 4 milliGRAM(s) Oral every 6 hours  furosemide   Injectable 20 milliGRAM(s) IV Push daily  levETIRAcetam 500 milliGRAM(s) Oral two times a day  metoprolol succinate ER 25 milliGRAM(s) Oral daily  morphine ER Tablet 15 milliGRAM(s) Oral every 12 hours  pantoprazole    Tablet 40 milliGRAM(s) Oral before breakfast  polyethylene glycol 3350 17 Gram(s) Oral daily  senna 2 Tablet(s) Oral at bedtime    MEDICATIONS  (PRN):  oxyCODONE    IR 15 milliGRAM(s) Oral every 4 hours PRN Moderate Pain (4 - 6)  oxyCODONE    IR 20 milliGRAM(s) Oral every 4 hours PRN Severe Pain (7 - 10)      Vital Signs Last 24 Hrs  T(C): 36.6 (20 Sep 2022 04:24), Max: 36.6 (20 Sep 2022 04:24)  T(F): 97.8 (20 Sep 2022 04:24), Max: 97.8 (20 Sep 2022 04:24)  HR: 81 (20 Sep 2022 04:24) (70 - 81)  BP: 152/79 (20 Sep 2022 04:24) (128/78 - 152/79)  BP(mean): --  RR: 18 (20 Sep 2022 04:24) (18 - 18)  SpO2: 92% (20 Sep 2022 04:24) (92% - 95%)    Parameters below as of 20 Sep 2022 04:24  Patient On (Oxygen Delivery Method): room air      CAPILLARY BLOOD GLUCOSE        I&O's Summary    19 Sep 2022 07:01  -  20 Sep 2022 07:00  --------------------------------------------------------  IN: 240 mL / OUT: 250 mL / NET: -10 mL        PHYSICAL EXAM:  HEAD:  Atraumatic, Normocephalic  NECK: Supple, No   JVD  CHEST/LUNG:   no     rales,     no,    rhonchi  HEART: Regular rate and rhythm;         murmur  ABDOMEN: Soft, Nontender, ;   EXTREMITIES:   no     edema  NEUROLOGY:  alert    LABS:                        12.8   20.97 )-----------( 435      ( 20 Sep 2022 06:54 )             40.0     09-20    133<L>  |  92<L>  |  36<H>  ----------------------------<  123<H>  4.1   |  27  |  0.52    Ca    10.2      20 Sep 2022 06:54  Phos  2.8     09-19  Mg     2.2     09-19                              Consultant(s) Notes Reviewed:      Care Discussed with Consultants/Other Providers:

## 2022-09-20 NOTE — PROGRESS NOTE ADULT - PROBLEM SELECTOR PLAN 1
Continue:  -Tylenol 650mg q6h PRN mild pain   -oxycodone 15mg q4h PRN moderate pain   -oxycodone 20mg q4h PRN severe pain   Start:  MS Contin PO 15mg BID  Hold above medications for sedation or RR <10  bowel regimen Continue current pain regimen  Plan is for d/c home tomorrow  Please discharge patient with following medications:  MS Contin 15mg PO BID  Oxycodone 15mg q4h PRN moderate pain  Oxycodone 20mg q4h PRN severe pain  bowel regimen

## 2022-09-20 NOTE — PROGRESS NOTE ADULT - PROBLEM SELECTOR PLAN 5
Will continue to follow for GOC and symptom management.   Pain recommnedations discussed with primary team ACP.    For acute issues or uncontrolled symptoms please page palliative team.    Lyn Villagomez MD  Geriatrics and Palliative Medicine Attending  [facility] pager: [phone]     The Geriatrics and Palliative Medicine consult service has 24/7 coverage for medical recommendations, including symptom management needs. Pain recommendations and plan of care discussed with patient and daughter Alissa.  Please set up appt with [facility] to see Dr. Suni Kelly or Dr. Marcia Agudelo for ongoing pain management.   Please include following information in discharge summary:    Location: [address] [place] [address]  Phone: [phone]    Please provide enough medication for pain management until date of f/u appointment.   Case discussed with primary team ACP and CM.

## 2022-09-21 NOTE — PROGRESS NOTE ADULT - PROBLEM SELECTOR PLAN 4
Plan is for outpt RT and f/u with oncologist  Pt remains full code. Ongoing discussion regarding code status with patient and family.  Pt reports daughter Alissa is HCP. Will need to obtain copy of HCP form.

## 2022-09-21 NOTE — DISCHARGE NOTE NURSING/CASE MANAGEMENT/SOCIAL WORK - NSDCPEFALRISK_GEN_ALL_CORE
For information on Fall & Injury Prevention, visit: [email or web address] OR  [email or web address] OR  https://www.cdc.gov/steadi/patient.html

## 2022-09-21 NOTE — PROGRESS NOTE ADULT - ASSESSMENT
75 yo F        h/o colon CA with  mets    no longer on oral chemo x  past  1 mo     p/w AMS x4 weeks, worsening over the last few days.  had  c/o right sided rib pain for weeks  and .sob  on exertion x days.     pmd  dr coffman   sent pt in ,  presented to her with AMS, worsening weakness and lethargy.     Per , pt slipped and fell in the bathroom while getting up from the toilet 3 weeks ago.       admitted   with  ams.  from cerebral   mets  with bleed.  able to  converse   h/o  metastatic Ca colon, oncologist dr jose enrique ignacio   N/S  eval, no intervention,   pt has no focal weakness   CT  chest angio. :  ,Pe, b/l pl effusions, hepatic mets /   T4 comp fx, with bony retropulsion,     TLSO  brace  per  N/S     pt  with +   PE/ R  leg  DVT,   unable to  a/c pt , given cerebral bleed/ family  aware of risks   wbc  noted. from malignancy  afebrile , , fall risk/ PT eval   oncology dr bullock  ,  on Decadron  for  h'gic mets, seen by  N/S         aware of  poor prognosis.  family   had  meeting  with palliative care, on 9/ 15,  remains  full code    s/p   IVC filter  by  IR  rx  plan per  dr bullock  is  palliative  in nature,  RT  to  brain / agree  with  oncology  that,  pt  ideal  candidate  for  comfort  care/  hospice  spoke with  daughter ,  pt remains  full  code  pain meds per  palliative  care team/   continue  current care  per  daughter   will  decide   on further   trajectory  after  meeting with  consultants    pe  N/S,  gammaknife  rt  a s an out pt  on dcedron/  morphine    elevated  wbc  from decadron. pt is  afebriel, cbc  is pending today    daughter  wants  pt  home.,  pt is  bed bound  now         rad< from: CT Head No Cont (09.13.22 @ 18:46) >  RESSION:  Multiple hyperdense nodules are may represent hemorrhagic metastasis in a   patient with history of colon carcinoma. Further evaluation with contrast   enhanced brain MRI is recommended.  No acute intracranial hemorrhage.  --- End of Report ---       RAD< from: CT Abdomen and Pelvis w/ IV Cont (09.13.22 @ 18:48) >  IMPRESSION:  Segmental and subsegmental acute pulmonary arterial emboli as detailed   above. Presence of pulmonary embolism was discussed with Dr. Aguirre by Dr. Orta on September 13, 2022 at 8:03 PM.  Extensive metastatic disease as detailed above.  Small multiloculated bilateral pleural effusions.  Compression fracture deformity of the T4 vertebral body with some   retropulsion of the fractured vertebral body into the spinal canal.   Dedicated thoracic spine MRI can be performed for complete evaluation as   clinically warranted.  --- End of Report ---                   77 yo F        h/o colon CA with  mets    no longer on oral chemo x  past  1 mo     p/w AMS x4 weeks, worsening over the last few days.  had  c/o right sided rib pain for weeks  and .sob  on exertion x days.     pmd  dr coffman   sent pt in ,  presented to her with AMS, worsening weakness and lethargy.     Per , pt slipped and fell in the bathroom while getting up from the toilet 3 weeks ago.       admitted   with  ams.  from cerebral   mets  with bleed.  able to  converse   h/o  metastatic Ca colon, oncologist dr jose enrique ignacio   N/S  eval, no intervention,   pt has no focal weakness   CT  chest angio. :  ,Pe, b/l pl effusions, hepatic mets /   T4 comp fx, with bony retropulsion,     TLSO  brace  per  N/S     pt  with +   PE/ R  leg  DVT,   unable to  a/c pt , given cerebral bleed/ family  aware of risks   wbc  noted. from malignancy  afebrile , , fall risk/ PT eval   oncology dr bullock  ,  on Decadron  for  h'gic mets, seen by  N/S         aware of  poor prognosis.  family   had  meeting  with palliative care, on 9/ 15,  remains  full code    s/p   IVC filter  by  IR  rx  plan per  dr bullock  is  palliative  in nature,  RT  to  brain / agree  with  oncology  that,  pt  ideal  candidate  for  comfort  care/  hospice  spoke with  daughter ,  pt remains  full  code  pain meds per  palliative  care team/   continue  current care  per  daughter   will  decide   on further   trajectory  after  meeting with  consultants    pe  N/S,  gammaknife  rt  a s an out pt  on dcedron/  morphine    elevated  wbc  from decadron. pt is  afebriel,  pt  ha s no infection,  house  ID to clear  pt    daughter  wants  pt  home.,  pt is  bed bound  now         rad< from: CT Head No Cont (09.13.22 @ 18:46) >  RESSION:  Multiple hyperdense nodules are may represent hemorrhagic metastasis in a   patient with history of colon carcinoma. Further evaluation with contrast   enhanced brain MRI is recommended.  No acute intracranial hemorrhage.  --- End of Report ---       RAD< from: CT Abdomen and Pelvis w/ IV Cont (09.13.22 @ 18:48) >  IMPRESSION:  Segmental and subsegmental acute pulmonary arterial emboli as detailed   above. Presence of pulmonary embolism was discussed with Dr. Aguirre by Dr. Orta on September 13, 2022 at 8:03 PM.  Extensive metastatic disease as detailed above.  Small multiloculated bilateral pleural effusions.  Compression fracture deformity of the T4 vertebral body with some   retropulsion of the fractured vertebral body into the spinal canal.   Dedicated thoracic spine MRI can be performed for complete evaluation as   clinically warranted.  --- End of Report ---

## 2022-09-21 NOTE — PROGRESS NOTE ADULT - SUBJECTIVE AND OBJECTIVE BOX
CARDIOLOGY     PROGRESS  NOTE   ________________________________________________    CHIEF COMPLAINT:Patient is a 76y old  Female who presents with a chief complaint of ams/ sob (21 Sep 2022 05:58)  lethargic/ sleeping.  	  REVIEW OF SYSTEMS:  CONSTITUTIONAL: No fever, weight loss, or fatigue  EYES: No eye pain, visual disturbances, or discharge  ENT:  No difficulty hearing, tinnitus, vertigo; No sinus or throat pain  NECK: No pain or stiffness  RESPIRATORY: No cough, wheezing, chills or hemoptysis; No Shortness of Breath  CARDIOVASCULAR: No chest pain, palpitations, passing out, dizziness, or leg swelling  GASTROINTESTINAL: No abdominal or epigastric pain. No nausea, vomiting, or hematemesis; No diarrhea or constipation. No melena or hematochezia.  GENITOURINARY: No dysuria, frequency, hematuria, or incontinence  NEUROLOGICAL: No headaches, memory loss, loss of strength, numbness, or tremors  SKIN: No itching, burning, rashes, or lesions   LYMPH Nodes: No enlarged glands  ENDOCRINE: No heat or cold intolerance; No hair loss  MUSCULOSKELETAL: No joint pain or swelling; No muscle, back, or extremity pain  PSYCHIATRIC: No depression, anxiety, mood swings, or difficulty sleeping  HEME/LYMPH: No easy bruising, or bleeding gums  ALLERGY AND IMMUNOLOGIC: No hives or eczema	    [ ] All others negative	  [x ] Unable to obtain    PHYSICAL EXAM:  T(C): 36.4 (09-21-22 @ 04:20), Max: 36.7 (09-20-22 @ 13:22)  HR: 77 (09-21-22 @ 04:20) (73 - 88)  BP: 135/83 (09-21-22 @ 04:20) (124/63 - 151/85)  RR: 18 (09-21-22 @ 04:20) (18 - 18)  SpO2: 91% (09-21-22 @ 04:20) (91% - 92%)  Wt(kg): --  I&O's Summary    20 Sep 2022 07:01  -  21 Sep 2022 07:00  --------------------------------------------------------  IN: 870 mL / OUT: 950 mL / NET: -80 mL        Appearance: Normal	  HEENT:   Normal oral mucosa, PERRL, EOMI	  Lymphatic: No lymphadenopathy  Cardiovascular: Normal S1 S2, No JVD, + murmurs, No edema  Respiratory: Lungs clear to auscultation	  Gastrointestinal:  Soft, Non-tender, + BS	  Skin: No rashes, No ecchymoses, No cyanosis	  Extremities: Normal range of motion, No clubbing, cyanosis or edema  Vascular: Peripheral pulses palpable 2+ bilaterally    MEDICATIONS  (STANDING):  dexAMETHasone     Tablet 4 milliGRAM(s) Oral every 6 hours  furosemide   Injectable 20 milliGRAM(s) IV Push daily  levETIRAcetam 500 milliGRAM(s) Oral two times a day  metoprolol succinate ER 25 milliGRAM(s) Oral daily  morphine ER Tablet 15 milliGRAM(s) Oral every 12 hours  pantoprazole    Tablet 40 milliGRAM(s) Oral before breakfast  polyethylene glycol 3350 17 Gram(s) Oral daily  senna 2 Tablet(s) Oral at bedtime      TELEMETRY: 	    ECG:  	  RADIOLOGY:  OTHER: 	  	  LABS:	 	    CARDIAC MARKERS:                                13.4   25.48 )-----------( 453      ( 21 Sep 2022 07:05 )             41.3     09-20    133<L>  |  92<L>  |  36<H>  ----------------------------<  123<H>  4.1   |  27  |  0.52    Ca    10.2      20 Sep 2022 06:54      proBNP: Serum Pro-Brain Natriuretic Peptide: 2287 pg/mL (09-13 @ 18:51)    Lipid Profile:   HgA1c:   TSH:         Assessment and plan  ---------------------------  77 yo F h/o colon CA with  mets was on   chemo ,  no longer on oral chemo x  past  1 mo p/w AMS x4 weeks, worsening over the last few days.     had also been c/o right sided rib pain for weeks as well.sob  on exertion x days.     pmd  d r brand   sent pt in for further evaluation as pt presented to her with AMS, worsening weakness and lethargy.     Per , pt slipped and fell in the bathroom while getting up from the toilet 3 weeks ago.    Unsure if she had any LOC. Has had unsteady gait since.      +intermittent episodes of diarrhea     Denies nausea, vomiting, fever, chills, cough, chest pain, blood in stool, urinary complaints, headache.   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  AC contraindicated  pt needs IVC filter any way despite doppler  echo in er noted with normal ef and bl pleural effusion with increase pro bnp  dc ivf  increase beta blocker for bp control, controlled now  PE on no AC sec to brain hemorrhagic mets  palliative care noted

## 2022-09-21 NOTE — PROGRESS NOTE ADULT - PROBLEM SELECTOR PLAN 1
Continue current pain regimen  Plan is for d/c home   Please discharge patient with 7 days supply of following medications:  MS Contin 15mg PO BID  Oxycodone 15mg q4h PRN moderate pain  Oxycodone 20mg q4h PRN severe pain  bowel regimen

## 2022-09-21 NOTE — CONSULT NOTE ADULT - CONSULT REASON
elevated WBC
brain mets
IVC filter placement
Mets to brain
sob
Metastatic colon cancer, brain mets, PE
goc/symptom management

## 2022-09-21 NOTE — PROGRESS NOTE ADULT - SUBJECTIVE AND OBJECTIVE BOX
Indication for Geriatrics and Palliative Care Services/INTERVAL HPI: symptom management and GOC in setting of advanced malignancy  SUBJECTIVE AND OBJECTIVE: Pt seen and examined at bedside. Reports feeling tired but states pain is well controlled    OVERNIGHT EVENTS: Required PRN PO oxycodone 15mg x1 and oxycodone 20mgx1 in 24 hours 8am-8am.    DNR on chart:  Allergies    Levaquin ([place])    Intolerances    MEDICATIONS  (STANDING):  dexAMETHasone     Tablet 4 milliGRAM(s) Oral every 6 hours  furosemide   Injectable 20 milliGRAM(s) IV Push daily  levETIRAcetam 500 milliGRAM(s) Oral two times a day  metoprolol succinate ER 25 milliGRAM(s) Oral daily  morphine ER Tablet 15 milliGRAM(s) Oral every 12 hours  pantoprazole    Tablet 40 milliGRAM(s) Oral before breakfast  senna 2 Tablet(s) Oral at bedtime    MEDICATIONS  (PRN):  oxyCODONE    IR 15 milliGRAM(s) Oral every 4 hours PRN Moderate Pain (4 - 6)  oxyCODONE    IR 20 milliGRAM(s) Oral every 4 hours PRN Severe Pain (7 - 10)      ITEMS UNCHECKED ARE NOT PRESENT    PRESENT SYMPTOMS: [ ]Unable to self-report - see [ ] CPOT [ ] PAINADS [ ] RDOS  Source if other than patient:  [ ]Family   [ ]Team     Pain: [x ]yes [ ]no  QOL impact - impacts ADLS  Location - epigastric region and right side of abdomen/flank region                   Aggravating factors - movement  Quality - throbbing  Radiation - none  Timing- constant   Severity (0-10 scale): 10  Minimal acceptable level (0-10 scale): 2-3      CPOT:    https://www.sccm.org/getattachment/tum50j36-3d7q-8w4i-5y4d-2319o0356i1z/Critical-Care-Pain-Observation-Tool-(CPOT)    Dyspnea:                           [ ]Mild [ ]Moderate [ ]Severe  Anxiety:                             [ ]Mild [ ]Moderate [ ]Severe  Fatigue:                             [ ]Mild [ ]Moderate [ ]Severe  Nausea:                             [ ]Mild [ ]Moderate [ ]Severe  Loss of appetite:              [ ]Mild [ ]Moderate [ ]Severe  Constipation:                    [ ]Mild [ ]Moderate [ ]Severe    PCSSQ[Palliative Care Spiritual Screening Question]   Severity (0-10):  Score of 4 or > indicate consideration of Chaplaincy referral.  Chaplaincy Referral: [ ] yes [x ] refused [ ] following [ ] Deferred     Caregiver Woodson? : [ ] yes [x ] no [ ] Deferred [ ] Declined             Social work referral [ ] Patient & Family Centered Care Referral [ ]     Anticipatory Grief present?:  [ ] yes [x ] no  [ ] Deferred                  Social work referral [ ] Chaplaincy Referral[ ]      Other Symptoms:  [x ]All other review of systems negative   [ ]Unable to obtain due to poor mentation    Palliative Performance Status Version 2:   40      %      http://npcrc.org/files/news/palliative_performance_scale_ppsv2.pdf  PHYSICAL EXAM:  Vital Signs Last 24 Hrs  T(C): 36.4 (21 Sep 2022 04:20), Max: 36.7 (20 Sep 2022 13:22)  T(F): 97.6 (21 Sep 2022 04:20), Max: 98.1 (20 Sep 2022 13:22)  HR: 77 (21 Sep 2022 04:20) (73 - 88)  BP: 135/83 (21 Sep 2022 04:20) (124/63 - 151/85)  BP(mean): --  RR: 18 (21 Sep 2022 04:20) (18 - 18)  SpO2: 91% (21 Sep 2022 04:20) (91% - 92%)    Parameters below as of 21 Sep 2022 04:20  Patient On (Oxygen Delivery Method): room air       GENERAL:  [x]Alert  [x]Oriented x 3  [ ]Lethargic  [ ]Cachexia  [ ]Unarousable  [x]Verbal  [ ]Non-Verbal  Behavioral:   [ ]Anxiety  [ ]Delirium [ ]Agitation [ ]Other  HEENT:  [x]Normal   [ ]Dry mouth   [ ]ET Tube/Trach  [ ]Oral lesions  PULMONARY:   [x]Clear [ ]Tachypnea  [ ]Audible excessive secretions   [ ]Rhonchi        [ ]Right [ ]Left [ ]Bilateral  [ ]Crackles        [ ]Right [ ]Left [ ]Bilateral  [ ]Wheezing     [ ]Right [ ]Left [ ]Bilateral  [ ]Diminished BS [ ] Right [ ]Left [ ]Bilateral  CARDIOVASCULAR:    [x]Regular [ ]Irregular [ ]Tachy  [ ]Bobo [ ]Murmur [ ]Other  GASTROINTESTINAL:  [x]Soft  [ ]Distended   [x]+BS  [ ]Non tender [x ]Tender  [ ]PEG [ ]OGT/ NGT   Last BM: 9/21 per patient, no bm documented in flowsheets  GENITOURINARY:  [x]Normal [ ]Incontinent   [ ]Oliguria/Anuria   [ ]Shore  MUSCULOSKELETAL:   [ ]Normal   [x]Weakness  [ ]Bed/Wheelchair bound [ ]Edema  NEUROLOGIC:   [x]No focal deficits  [ ] Cognitive impairment  [ ] Dysphagia [ ]Dysarthria [ ] Paresis [ ]Other   SKIN:   [x]Normal  [ ]Rash   [ ]Pressure ulcer(s) [ ]y [ ]n present on admission    CRITICAL CARE:  [ ] Shock Present  [ ]Septic [ ]Cardiogenic [ ]Neurologic [ ]Hypovolemic  [ ]  Vasopressors [ ]  Inotropes   [ ]Respiratory failure present [ ]Mechanical ventilation [ ]Non-invasive ventilatory support [ ]High flow    [ ]Acute  [ ]Chronic [ ]Hypoxic  [ ]Hypercarbic [ ]Other  [ ]Other organ failure     LABS:                                              13.4   25.48 )-----------( 453      ( 21 Sep 2022 07:05 )             41.3   09-20    133<L>  |  92<L>  |  36<H>  ----------------------------<  123<H>  4.1   |  27  |  0.52    Ca    10.2      20 Sep 2022 06:54          RADIOLOGY & ADDITIONAL STUDIES: < from: MR Head w/wo IV Cont (09.14.22 @ 19:13) >  ACC: [id number] EXAM:  MR BRAIN WAW IC                          PROCEDURE DATE:  09/14/2022          INTERPRETATION:  CLINICAL INDICATION: Brain metastases. Altered mental   status.    TECHNIQUE: Multi-planar multi-sequential MR imaging of the brain was   performed before and after the intravenous administration of contrast.   7.5 ml of Gadavist IV contrast was administered.    COMPARISON: CT head 9/13/2022.    FINDINGS:  Numerous enhancing intracranial lesions throughout the bilateral cerebral   hemispheres and left cerebellum with surrounding edema. The largest   lesions measure 2.8 x 2.2 x 2.8 cm in the right occipital lobe (4-3), 2.7   x 2.0 x 2.4 cm in the right parietal lobe (4-13) and 2.4 x 1.7 x 1.6 cm   and the left inferior cerebellum (3-12). The left inferior cerebellar   lesion partially effaces the caudal aspect of the fourth ventricle and   left foramen of Luschka.    There is partial effacement of the right lateral ventricle secondary to   surrounding vasogenic edema. However there is mild prominence of the left   lateral ventricle with FLAIR hyperintense signal along the frontal and   occipital horns. Mild hydrocephalus with transependymal CSF flow cannot   be ruled out.     No acute infarct or intracranial hemorrhage identified. No extra-axial   fluid collections. The skull base flow voids are present.    The visualized intraorbital contents are normal. The imaged portions of   the paranasal sinuses are clear. The mastoid air cells are clear. The   visualized osseous structures, soft tissues and partially visualized   parotid glands appear normal.    IMPRESSION:    -Extensive intracranial metastatic disease with surrounding vasogenic   edema. Largest lesions are detailed above.    -Left inferior cerebellar lesion partially effaces the caudal aspect of   the fourth ventricle and left foramen Luschka, with possible associated   mild hydrocephalus.    --- End of Report ---            NIKKY LAM MD; Attending Radiologist  This document has been electronically signed. Sep 15 2022 10:08AM    < end of copied text >        Protein Calorie Malnutrition Present: [ ]mild [ ]moderate [ ]severe [ ]underweight [ ]morbid obesity  https://www.andeal.org/vault/2440/web/files/ONC/Table_Clinical%20Characteristics%20to%20Document%20Malnutrition-White%20JV%20et%20al%202012.pdf    Height (cm): 170.2 (09-16-22 @ 16:54)  Weight (kg): 55.3 (09-16-22 @ 16:54)  BMI (kg/m2): 19.1 (09-16-22 @ 16:54)    [ ]PPSV2 < or = 30%  [ ]significant weight loss [ ]poor nutritional intake [ ]anasarca[ ]Artificial Nutrition    Other REFERRALS:  [ ]Hospice  [ ]Child Life  [ ]Social Work  [ ]Case management [ ]Holistic Therapy     Goals of Care Document:EVA Knapp (09-15-22 @ 15:35)  Goals of Care Conversation:   Participants:  · Participants  Patient; Family; Staff  · Spouse  Mayank  · Child(cabrera)  daughter Alissa, son Cuong  · Relative  daughter in law Karrie  · Provider  Dr. Villagomez of Palliative  ·   Navin Royal LCSW    Advance Directives:  · Does patient have Advance Directive  No  · Does Patient Have a Surrogate  No  · Does the Patient have a Court Appointed Guardian ([address])  No  · Caregiver:  declines    Conversation Discussion:  · Conversation  Diagnosis; Prognosis; MOLST Discussed; Treatment Options  · Conversation Details  GAP consulted to assist in GOC and active pain/symptom management in the setting of patient with advanced illness. [place] and Dr. Villagomez met with patient and patient's family today for family meeting.    Team first introduced selves and roles in patient care. Patient and family verbalized understanding and were receptive to visit today. Team next inquired into patient understanding of current medical status, and patient demonstrates understanding of status. Patient states that she is aware that she has cancer and notes a functional change immediately prior to admission including weakness and AMS. Patient states that she was previously receiving OP treatment but had stopped approximately three weeks ago due to the effects the treatment was having on patient's status.     Team validated this overview today, and discussed plans for Oncology visit today and input regarding patient's status and next steps in care. Team inquired into patient goals at this time whilst patient and family awaits oncology input, and patient notes feeling uncertain if she would wish to undergo further treatment due to the experience she had prior to stopping treatment. Patient and family note wishing to hear more from oncology about options prior to making any further decisions in care, and team validated this again today.    Team lastly inquired into advance care planning, and inquired what patient might find to be acceptable in the event her heart were to stop. Patient states that she is unsure what she would find to be acceptable, but states she will consider the question further and will discuss amongst her family. Team encouraged this discussion and discussed plans for follow up regarding the topic.    Team lastly discussed patient's symptom burden and regimen that is recommended to assist in care. Patient and family verbalized agreement and understanding, and note that patient symptom management is a paramount concern at present.     GAP will continue to follow this case, and patient remains full code at this time.    What Matters Most To Patient and Family:  · What matters most to patient and family  Time spent with family, pain/symptom management    Personal Advance Directives Treatment Guidelines:   Treatment Guidelines:  · Decision Maker  Patient    Location of Discussion:   Time Spent on Advance Care Planning:  I spent 30 (in minutes) on advance care planning services with the patient.  This time is separate and distinct from any other care management services provided on this date.    Location of Discussion:  · Location of discussion  Face to face      Electronic Signatures:  Navin Royal ([facility])  (Signed 15-Sep-2022 15:48)  	Authored: Goals of Care Conversation, Personal Advance Directives Treatment Guidelines, Location of Discussion  Lyn Villagomez)  (Signed 16-Sep-2022 11:20)  	Authored: Goals of Care Conversation      Last Updated: 16-Sep-2022 11:20 by Lyn Villagomez)

## 2022-09-21 NOTE — PROGRESS NOTE ADULT - PROBLEM SELECTOR PLAN 5
Patient has outpatient appt scheduled at [facility] to see Dr. Suni Kelly on 9/28 at 10:30AM.    Please discharge patient with 7 days supply of following medications:  MS Contin 15mg PO BID  Oxycodone 15mg q4h PRN moderate pain  Oxycodone 20mg q4h PRN severe pain  bowel regimen    Naloxone spray 4 mg/0.1 ml intranasal, Spray 0.1 mL into one nostril. Repeat with second device into other nostril after 2-3 minutes if no or minimal response ([email or web address]). Please be sure the patient's pharmacy has the medication, otherwise, be sure there is a pharmacy were the patient can get the Naloxone inhaled.    Please be also sure the patient has pain medications available in her pharmacy before discharge.  Plan of care and above recommendations discussed with patient and daughter, Alissa.

## 2022-09-21 NOTE — PROGRESS NOTE ADULT - SUBJECTIVE AND OBJECTIVE BOX
[facility]  [facility]:  GEN: no fever,    no chills  RESP: no SOB,   no cough  CVS: no chest pain,   no palpitations  GI: no abdominal pain,   no nausea,   no vomiting,   no constipation,   no diarrhea  : no dysuria,   no frequency  NEURO: no headache,   no dizziness  PSYCH: no depression,   not anxious  Derm : no rash    MEDICATIONS  (STANDING):  dexAMETHasone     Tablet 4 milliGRAM(s) Oral every 6 hours  furosemide   Injectable 20 milliGRAM(s) IV Push daily  levETIRAcetam 500 milliGRAM(s) Oral two times a day  metoprolol succinate ER 25 milliGRAM(s) Oral daily  morphine ER Tablet 15 milliGRAM(s) Oral every 12 hours  pantoprazole    Tablet 40 milliGRAM(s) Oral before breakfast  polyethylene glycol 3350 17 Gram(s) Oral daily  senna 2 Tablet(s) Oral at bedtime    MEDICATIONS  (PRN):  oxyCODONE    IR 15 milliGRAM(s) Oral every 4 hours PRN Moderate Pain (4 - 6)  oxyCODONE    IR 20 milliGRAM(s) Oral every 4 hours PRN Severe Pain (7 - 10)      Vital Signs Last 24 Hrs  T(C): 36.4 (21 Sep 2022 04:20), Max: 36.7 (20 Sep 2022 13:22)  T(F): 97.6 (21 Sep 2022 04:20), Max: 98.1 (20 Sep 2022 13:22)  HR: 77 (21 Sep 2022 04:20) (73 - 88)  BP: 135/83 (21 Sep 2022 04:20) (124/63 - 151/85)  BP(mean): --  RR: 18 (21 Sep 2022 04:20) (18 - 18)  SpO2: 91% (21 Sep 2022 04:20) (91% - 92%)    Parameters below as of 21 Sep 2022 04:20  Patient On (Oxygen Delivery Method): room air      CAPILLARY BLOOD GLUCOSE        I&O's Summary    19 Sep 2022 07:01  -  20 Sep 2022 07:00  --------------------------------------------------------  IN: 240 mL / OUT: 250 mL / NET: -10 mL    20 Sep 2022 07:01  -  21 Sep 2022 05:59  --------------------------------------------------------  IN: 870 mL / OUT: 650 mL / NET: 220 mL        PHYSICAL EXAM:  HEAD:  Atraumatic, Normocephalic  NECK: Supple, No   JVD  CHEST/LUNG:   no     rales,     no,    rhonchi  HEART: Regular rate and rhythm;         murmur  ABDOMEN: Soft, Nontender, ;   EXTREMITIES:    no    edema  NEUROLOGY:  alert    LABS:                        12.8   20.97 )-----------( 435      ( 20 Sep 2022 06:54 )             40.0     09-20    133<L>  |  92<L>  |  36<H>  ----------------------------<  123<H>  4.1   |  27  |  0.52    Ca    10.2      20 Sep 2022 06:54  Phos  2.8     09-19  Mg     2.2     09-19                              Consultant(s) Notes Reviewed:      Care Discussed with Consultants/Other Providers:

## 2022-09-21 NOTE — DISCHARGE NOTE NURSING/CASE MANAGEMENT/SOCIAL WORK - PATIENT PORTAL LINK FT
You can access the FollowMyHealth Patient Portal offered by [facility] by registering at the following website: [email or web address]. By joining [facility]’s FollowMyHealth portal, you will also be able to view your health information using other applications (apps) compatible with our system.

## 2022-09-21 NOTE — CONSULT NOTE ADULT - CONSULT REQUESTED DATE/TIME
13-Sep-2022 20:59
15-Sep-2022 15:46
21-Sep-2022 14:36
16-Sep-2022 17:13
19-Sep-2022 13:43
13-Sep-2022
15-Sep-2022 12:36
15-Sep-2022 16:50

## 2022-09-21 NOTE — CONSULT NOTE ADULT - PROVIDER SPECIALTY LIST ADULT
Heme/Onc
Heme/Onc
Rad Onc
Cardiology
Infectious Disease
Intervent Radiology
Neurosurgery
Palliative Care

## 2022-09-21 NOTE — CONSULT NOTE ADULT - SUBJECTIVE AND OBJECTIVE BOX
Patient is a 76y old  Female who presents with a chief complaint of ams/ sob (21 Sep 2022 11:30)      HPI:  75 yo F        h/o colon CA with  mets      was on   chemo ,  no longer on oral chemo x  past  1 mo     p/w AMS x4 weeks, worsening over the last few days.     had also been c/o right sided rib pain for weeks as well.sob  on exertion x days.     pmd  d r brand   sent pt in for further evaluation as pt presented to her with AMS, worsening weakness and lethargy.     Per , pt slipped and fell in the bathroom while getting up from the toilet 3 weeks ago.    Unsure if she had any LOC. Has had unsteady gait since.      +intermittent episodes of diarrhea     Denies nausea, vomiting, fever, chills, cough, chest pain, blood in stool, urinary complaints, headache. (13 Sep 2022 20:18)      PAST MEDICAL & SURGICAL HISTORY:  COPD (chronic obstructive pulmonary disease)  mild      Colon cancer      History of cholecystectomy  2000      Bone spur  Lt foot 5th digit 2014      H/O bilateral breast biopsy  2002      S/P tonsillectomy  childhood          Social history:   Social History:    Marital Status:   Occupation:   Lives with:     Substance Use :  Tobacco Usage:  (   ) never smoked   (   ) former smoker   (   ) current smoker  (     ) pack year  (        ) last tobacco use date  Alcohol Usage:  Travel:  Pets:          FAMILY HISTORY:      REVIEW OF SYSTEMS  General:	Denies any malaise fatigue or chills. Fevers absent    Skin:No rash  	  Ophthalmologic:Denies any visual complaints,discharge redness or photophobia  	  ENMT:No nasal discharge,headache,sinus congestion or throat pain.No dental complaints    Respiratory and Thorax:No cough,sputum or chest pain.Denies shortness of breath  	  Cardiovascular:	No chest pain,palpitaions or dizziness    Gastrointestinal:	NO nausea,abdominal pain or diarrhea.    Genitourinary:	No dysuria,frequency. No flank pain    Musculoskeletal:	No joint swelling or pain.No weakness    Neurological:No confusion,diziness.No extremity weakness.No bladder or bowel incontinence	    Psychiatric:No delusions or hallucinations	    Hematology/Lymphatics:	No LN swelling.No gum bleeding     Endocrine:	No recent weight gain or loss.No abnormal heat/cold intolerance    Allergic/Immunologic:	No hives or rash     Allergies    Levaquin (Flushing)    Intolerances        Antimicrobials:       MEDICATIONS  (prior antimicrobials ):                 MEDICATIONS  (STANDING):  dexAMETHasone     Tablet 4 milliGRAM(s) Oral every 6 hours  furosemide   Injectable 20 milliGRAM(s) IV Push daily  levETIRAcetam 500 milliGRAM(s) Oral two times a day  metoprolol succinate ER 25 milliGRAM(s) Oral daily  morphine ER Tablet 15 milliGRAM(s) Oral every 12 hours  pantoprazole    Tablet 40 milliGRAM(s) Oral before breakfast  polyethylene glycol 3350 17 Gram(s) Oral daily  senna 2 Tablet(s) Oral at bedtime    MEDICATIONS  (PRN):  oxyCODONE    IR 15 milliGRAM(s) Oral every 4 hours PRN Moderate Pain (4 - 6)  oxyCODONE    IR 20 milliGRAM(s) Oral every 4 hours PRN Severe Pain (7 - 10)        Vital Signs Last 24 Hrs  T(C): 36.5 (21 Sep 2022 13:13), Max: 36.5 (21 Sep 2022 13:13)  T(F): 97.7 (21 Sep 2022 13:13), Max: 97.7 (21 Sep 2022 13:13)  HR: 90 (21 Sep 2022 14:26) (73 - 90)  BP: 131/82 (21 Sep 2022 13:13) (124/63 - 135/83)  BP(mean): --  RR: 21 (21 Sep 2022 14:26) (18 - 21)  SpO2: 85% (21 Sep 2022 14:26) (85% - 92%)    Parameters below as of 21 Sep 2022 14:26  Patient On (Oxygen Delivery Method): room air        PHYSICAL EXAM:Pleasant patient in no acute distress.      Constitutional:Comfortable.Awake and alert  No cachexia     Eyes:PERRL EOMI.NO discharge or conjunctival injection    ENMT:No sinus tenderness.No thrush.No pharyngeal exudate or erythema.Fair dental hygiene    Neck:Supple,No LN,no JVD      Respiratory:Good air entry bilaterally,CTA    Cardiovascular:S1 S2 wnl, No murmurs,rub or gallops    Gastrointestinal:Soft BS(+) no tenderness no masses ,No rebound or guarding    Genitourinary:No CVA tendereness     Rectal:    Extremities:No cyanosis,clubbing or edema.    Vascular:peripheral pulses felt    Neurological:AAO X 3,No grossly focal deficits    Skin:No rash     Lymph Nodes:No palpable LNs    Musculoskeletal:No joint swelling or LOM    Psychiatric:Affect normal.                                13.4   25.48 )-----------( 453      ( 21 Sep 2022 07:05 )             41.3         09-20    133<L>  |  92<L>  |  36<H>  ----------------------------<  123<H>  4.1   |  27  |  0.52    Ca    10.2      20 Sep 2022 06:54                RECENT CULTURES:      MICROBIOLOGY:          Radiology:    < from: [facility] Duplex Lower Ext Vein Scan, Bilat (09.15.22 @ 11:18) >  IMPRESSION:    Deep venous thrombosis involving the right common femoral and femoral   vein.  PA Babar Ting was performed at the end of the examination.    Patent left lower extremity veins.      < end of copied text >      < from: MR Head w/wo IV Cont (09.14.22 @ 19:13) >    ACC: [id number] EXAM:  MR BRAIN WAW IC                          PROCEDURE DATE:  09/14/2022          INTERPRETATION:  CLINICAL INDICATION: Brain metastases. Altered mental   status.    TECHNIQUE: Multi-planar multi-sequential MR imaging of the brain was   performed before and after the intravenous administration of contrast.   7.5 ml of Gadavist IV contrast was administered.    COMPARISON: CT head 9/13/2022.    FINDINGS:  Numerous enhancing intracranial lesions throughout the bilateral cerebral   hemispheres and left cerebellum with surrounding edema. The largest   lesions measure 2.8 x 2.2 x 2.8 cm in the right occipital lobe (4-3), 2.7   x 2.0 x 2.4 cm in the right parietal lobe (4-13) and 2.4 x 1.7 x 1.6 cm   and the left inferior cerebellum (3-12). The left inferior cerebellar   lesion partially effaces the caudal aspect of the fourth ventricle and   left foramen of Luschka.    There is partial effacement of the right lateral ventricle secondary to   surrounding vasogenic edema. However there is mild prominence of the left   lateral ventricle with FLAIR hyperintense signal along the frontal and   occipital horns. Mild hydrocephalus with transependymal CSF flow cannot   be ruled out.     No acute infarct or intracranial hemorrhage identified. No extra-axial   fluid collections. The skull base flow voids are present.    The visualized intraorbital contents are normal. The imaged portions of   the paranasal sinuses are clear. The mastoid air cells are clear. The   visualized osseous structures, soft tissues and partially visualized   parotid glands appear normal.    IMPRESSION:    -Extensive intracranial metastatic disease with surrounding vasogenic   edema. Largest lesions are detailed above.    -Left inferior cerebellar lesion partially effaces the caudal aspect of   the fourth ventricle and left foramen Luschka, with possible associated   mild hydrocephalus.    --- End of Report ---          < end of copied text >  < from: MR Thoracic Spine w/wo IV Cont (09.14.22 @ 19:12) >  IMPRESSION:    Acute to subacute severe T4 compression fracture with bony retropulsion   resulting in mild spinal canal stenosis. No thoracic cord compression.    No evidence for thoracic spinal metastases.    --- End of Report ---    < end of copied text >    < from: CT Angio Chest PE Protocol w/ IV Cont (09.13.22 @ 18:48) >    ACC: [id number] EXAM:  CT ANGIO CHEST PULM ART [facility]                        ACC: [id number] EXAM:  CT ABDOMEN AND PELVIS IC                          PROCEDURE DATE:  09/13/2022          INTERPRETATION:  CLINICAL INFORMATION: Shortness of breath. History of   colon cancer.    COMPARISON: None.    CONTRAST/COMPLICATIONS:  IV Contrast: IV contrast documented in associated exam (accession   [id number]), Omnipaque 350 (accession [id number])  90 cc administered   0 cc   discarded  Oral Contrast: NONE  Complications: None reported at time of study completion    PROCEDURE:  CT Angiography of the Chest was performed followed by portal venous phase   imaging of the Abdomen and Pelvis.  Sagittal and coronal reformats were performed as well as 3D (MIP)   reconstructions.    FINDINGS:  CHEST:  LUNGS AND LARGE AIRWAYS: A few endobronchial nodules likely representing   secretions.. Multiple bilateral nodules with the largest in the right   upper lobe measuring up to about 2.4 cm. Centrilobular emphysema.  PLEURA: Bilateral small multiloculated pleural effusions, right greater   than left. Associated bilateral lower lung pleural thickening and   nodularity, right greater than left suggestive of metastatic disease.  VESSELS: Left upper lobe subsegmental pulmonary arterial embolus.   Pulmonary arterial embolus within the right middle lobe medial segmental   pulmonary arterial branch extending into the subsegmental pulmonary   arteries. Right upper lobe subsegmental pulmonary arterial embolism.  HEART: Heart size is normal. No pericardial effusion.  MEDIASTINUM AND MICHAEL: Mildly enlarged mediastinal and hilar lymph nodes.   For reference there is a right paratracheal mildly enlarged 1.2 cm lymph   node.  CHEST WALL AND LOWER NECK: Calcifications and nodularity of the right   breast.    ABDOMEN AND PELVIS:  LIVER: Multiple hypodense and heterogenous hepatic lesions representing   metastatic given history. One of the larger hepatic lesions measuring   about 6.1 cm.  BILE DUCTS: Normal caliber.  GALLBLADDER: Cholecystectomy.  SPLEEN: Within normal limits.  PANCREAS: Hypodense lesions at the junction of the pancreatic head and   body, part of which measures about 1.3 cm. There is no pancreatic ductal   dilatation. There is no peripancreatic inflammatory fat stranding.  ADRENALS: Within normal limits.  KIDNEYS/URETERS: Bilateral renal cysts. No hydronephrosis.    BLADDER: Within normal limits.  REPRODUCTIVE ORGANS: Uterus is within normal limits.    BOWEL: No bowel obstruction. Appendix is not visualized. No evidence of   inflammation in the pericecal region. Stool burden is noted throughout   the colon.  PERITONEUM: No ascites.  VESSELS: Atherosclerotic changes.  RETROPERITONEUM/LYMPH NODES: Enlarged lymph nodes in the portacaval   region part of which measures about 2.5 x 1.1 cm.  ABDOMINAL WALL: Within normal limits.  BONES: Age-indeterminate compression deformity of T4 with some   retropulsion of the fractured vertebral body into the spinal canal. Lytic   lesion involving the right ninth rib representing metastatic disease.   Degenerative changes.    IMPRESSION:  Segmental and subsegmental acute pulmonary arterial emboli as detailed   above. Presence of pulmonary embolism was discussed with Dr. Aguirre by Dr. Orta on September 13, 2022 at 8:03 PM.    Extensive metastatic disease as detailed above.    Small multiloculated bilateral pleural effusions.    Compression fracture deformity of the T4 vertebral body with some   retropulsion of the fractured vertebral body into the spinal canal.   Dedicated thoracic spine MRI can be performed for complete evaluation as   clinically warranted.        < end of copied text >     Patient is a 76y old  Female who presents with a chief complaint of ams/ sob (21 Sep 2022 11:30)      HPI:  75 yo F        h/o colon CA with  mets      was on   chemo ,  no longer on oral chemo x  past  1 mo     p/w AMS x4 weeks, worsening over the last few days.     had also been c/o right sided rib pain for weeks as well.sob  on exertion x days.     pmd  d r brand   sent pt in for further evaluation as pt presented to her with AMS, worsening weakness and lethargy.     Per , pt slipped and fell in the bathroom while getting up from the toilet 3 weeks ago.    Unsure if she had any LOC. Has had unsteady gait since.      +intermittent episodes of diarrhea     Denies nausea, vomiting, fever, chills, cough, chest pain, blood in stool, urinary complaints, headache. (13 Sep 2022 20:18)    CTH: mult hyperdense lesions c/f mets (?heme). CT CAP showing acute PE, pleural effusions, ext mets disease, T4 VB comp fx w/ retropulsion  MRI brain with multiple brain mets:  neurosurgery recommend Rad onc for radiosurgery. C  -TLSO brace when OOB for comfort  -Pt was started on Dex 4q6 and Keppra 500bid. Blood cultures were negative   - Pt with DVT and PE IVC filter placed 9/16 9/19 seen by radiation oncology : The plan as discussed with Dr. Tran/ Dr. Nathan is outpatient gammaknife radiosurgery after discharge.  This is done at [facility] [address]  family has refused palliative care/comfort care. Pt is not DNR  ID is now called for elevated WBC      PAST MEDICAL & SURGICAL HISTORY:  COPD (chronic obstructive pulmonary disease)  mild      Colon cancer      History of cholecystectomy  2000      Bone spur  Lt foot 5th digit 2014      H/O bilateral breast biopsy  2002      S/P tonsillectomy  childhood          Social history:   former smoker  1/2 PPD for 35 years  lives with family            FAMILY HISTORY: no family h/o colon ca ( per chart)      REVIEW OF SYSTEMS  pt is unable to provide history  Admission note mentions diarrhea but patient denies as does the nurse  mouth is dry  remainder of ROS is per admission note         Allergies    Levaquin (Flushing)    Intolerances        Antimicrobials:       MEDICATIONS  (prior antimicrobials ):                 MEDICATIONS  (STANDING):  dexAMETHasone     Tablet 4 milliGRAM(s) Oral every 6 hours  furosemide   Injectable 20 milliGRAM(s) IV Push daily  levETIRAcetam 500 milliGRAM(s) Oral two times a day  metoprolol succinate ER 25 milliGRAM(s) Oral daily  morphine ER Tablet 15 milliGRAM(s) Oral every 12 hours  pantoprazole    Tablet 40 milliGRAM(s) Oral before breakfast  polyethylene glycol 3350 17 Gram(s) Oral daily  senna 2 Tablet(s) Oral at bedtime    MEDICATIONS  (PRN):  oxyCODONE    IR 15 milliGRAM(s) Oral every 4 hours PRN Moderate Pain (4 - 6)  oxyCODONE    IR 20 milliGRAM(s) Oral every 4 hours PRN Severe Pain (7 - 10)        Vital Signs Last 24 Hrs  T(C): 36.5 (21 Sep 2022 13:13), Max: 36.5 (21 Sep 2022 13:13)  T(F): 97.7 (21 Sep 2022 13:13), Max: 97.7 (21 Sep 2022 13:13)  HR: 90 (21 Sep 2022 14:26) (73 - 90)  BP: 131/82 (21 Sep 2022 13:13) (124/63 - 135/83)  BP(mean): --  RR: 21 (21 Sep 2022 14:26) (18 - 21)  SpO2: 85% (21 Sep 2022 14:26) (85% - 92%)    Parameters below as of 21 Sep 2022 14:26  Patient On (Oxygen Delivery Method): room air        PHYSICAL EXAM: weak  follows commands such as raise hands , raise legs but only able to answer some yes no questions      Constitutional:Comfortable. weak    Eyes:PERRL EOMI.NO discharge or conjunctival injection    ENMT:No sinus tenderness.No thrush.No pharyngeal exudate or erythema.Fair dental hygiene DRy MOUTH    Neck:Supple,No LN,no JVD      Respiratory:Good air entry bilaterally,CTA    Cardiovascular:S1 S2     Gastrointestinal:Soft BS(+) no tenderness     Extremities:No cyanosis,clubbing or edema.    Skin:No rash     Lymph Nodes:No palpable LNs    Musculoskeletal:No joint swelling or LOM                                    13.4   25.48 )-----------( 453      ( 21 Sep 2022 07:05 )             41.3         09-20    133<L>  |  92<L>  |  36<H>  ----------------------------<  123<H>  4.1   |  27  |  0.52    Ca    10.2      20 Sep 2022 06:54        Comprehensive Metabolic Panel (09.13.22 @ 18:51)    Sodium, Serum: 134 mmol/L    Potassium, Serum: 3.7 mmol/L    Chloride, Serum: 91 mmol/L    Carbon Dioxide, Serum: 28 mmol/L    Anion Gap, Serum: 15 mmol/L    Blood Urea Nitrogen, Serum: 23 mg/dL    Creatinine, Serum: 0.94 mg/dL    Glucose, Serum: 122 mg/dL    Calcium, Total Serum: 10.6 mg/dL    Protein Total, Serum: 8.7 g/dL    Albumin, Serum: 4.1 g/dL    Bilirubin Total, Serum: 0.7 mg/dL    Alkaline Phosphatase, Serum: 263 U/L    Aspartate Aminotransferase (AST/SGOT): 35 U/L    Alanine Aminotransferase (ALT/SGPT): 16 U/L    eGFR: 64: The estimated glomerular filtration rate (eGFR) is calculated using the  2021 CKD-EPI creatinine equation, which does not have a coefficient for  race and is validated in individuals 18 years of age and older (N Engl J  Med 2021; 385:3557-6855). Creatinine-based eGFR may be inaccurate in  various situations including but not limited to extremes of muscle mass,  altered dietary protein intake, or medications that affect renal tubular  creatinine secretion. mL/min/1.73m2      Respiratory Viral Panel with COVID-19 by GHANSHYAM (09.13.22 @ 17:39)    SARS-CoV-2: NotDetec: This Respiratory Panel uses polymerase chain reaction (PCR) to detect for  adenovirus; coronavirus (HKU1, NL63, 229E, OC43); human metapneumovirus  (hMPV); human enterovirus/rhinovirus (Entero/RV); influenza A; influenza  A/H1; influenza A/H3; influenza A/H1-2009; influenza B; parainfluenza  viruses 1, 2, 3, 4; respiratory syncytial virus; Mycoplasma pneumoniae;  Chlamydophila pneumoniae; and SARS-CoV-2.      Culture - Urine (09.13.22 @ 22:47)    Specimen Source: Clean Catch Clean Catch (Midstream)    Culture Results:   <10,000 CFU/mL Normal Urogenital Zamzam    Culture - Blood (09.13.22 @ 21:30)    Specimen Source: .Blood Blood-Peripheral    Culture Results:   No Growth Final    Culture - Blood (09.13.22 @ 18:25)    Specimen Source: .Blood Blood-Peripheral    Culture Results:   No Growth Final            Radiology:    < from: VA Duplex Lower Ext Vein Scan, Bilat (09.15.22 @ 11:18) >  IMPRESSION:    Deep venous thrombosis involving the right common femoral and femoral   vein.  PA Babar Ting was performed at the end of the examination.    Patent left lower extremity veins.      < end of copied text >      < from: MR Head w/wo IV Cont (09.14.22 @ 19:13) >    ACC: [id number] EXAM:  MR BRAIN WAW IC                          PROCEDURE DATE:  09/14/2022          INTERPRETATION:  CLINICAL INDICATION: Brain metastases. Altered mental   status.    TECHNIQUE: Multi-planar multi-sequential MR imaging of the brain was   performed before and after the intravenous administration of contrast.   7.5 ml of Gadavist IV contrast was administered.    COMPARISON: CT head 9/13/2022.    FINDINGS:  Numerous enhancing intracranial lesions throughout the bilateral cerebral   hemispheres and left cerebellum with surrounding edema. The largest   lesions measure 2.8 x 2.2 x 2.8 cm in the right occipital lobe (4-3), 2.7   x 2.0 x 2.4 cm in the right parietal lobe (4-13) and 2.4 x 1.7 x 1.6 cm   and the left inferior cerebellum (3-12). The left inferior cerebellar   lesion partially effaces the caudal aspect of the fourth ventricle and   left foramen of Luschka.    There is partial effacement of the right lateral ventricle secondary to   surrounding vasogenic edema. However there is mild prominence of the left   lateral ventricle with FLAIR hyperintense signal along the frontal and   occipital horns. Mild hydrocephalus with transependymal CSF flow cannot   be ruled out.     No acute infarct or intracranial hemorrhage identified. No extra-axial   fluid collections. The skull base flow voids are present.    The visualized intraorbital contents are normal. The imaged portions of   the paranasal sinuses are clear. The mastoid air cells are clear. The   visualized osseous structures, soft tissues and partially visualized   parotid glands appear normal.    IMPRESSION:    -Extensive intracranial metastatic disease with surrounding vasogenic   edema. Largest lesions are detailed above.    -Left inferior cerebellar lesion partially effaces the caudal aspect of   the fourth ventricle and left foramen Luschka, with possible associated   mild hydrocephalus.    --- End of Report ---          < end of copied text >  < from: MR Thoracic Spine w/wo IV Cont (09.14.22 @ 19:12) >  IMPRESSION:    Acute to subacute severe T4 compression fracture with bony retropulsion   resulting in mild spinal canal stenosis. No thoracic cord compression.    No evidence for thoracic spinal metastases.    --- End of Report ---    < end of copied text >    < from: CT Angio Chest PE Protocol w/ IV Cont (09.13.22 @ 18:48) >    ACC: [id number] EXAM:  CT ANGIO CHEST PULM ART [facility]                        ACC: [id number] EXAM:  CT ABDOMEN AND PELVIS IC                          PROCEDURE DATE:  09/13/2022          INTERPRETATION:  CLINICAL INFORMATION: Shortness of breath. History of   colon cancer.    COMPARISON: None.    CONTRAST/COMPLICATIONS:  IV Contrast: IV contrast documented in associated exam (accession   [id number]), Omnipaque 350 (accession [id number])  90 cc administered   0 cc   discarded  Oral Contrast: NONE  Complications: None reported at time of study completion    PROCEDURE:  CT Angiography of the Chest was performed followed by portal venous phase   imaging of the Abdomen and Pelvis.  Sagittal and coronal reformats were performed as well as 3D (MIP)   reconstructions.    FINDINGS:  CHEST:  LUNGS AND LARGE AIRWAYS: A few endobronchial nodules likely representing   secretions.. Multiple bilateral nodules with the largest in the right   upper lobe measuring up to about 2.4 cm. Centrilobular emphysema.  PLEURA: Bilateral small multiloculated pleural effusions, right greater   than left. Associated bilateral lower lung pleural thickening and   nodularity, right greater than left suggestive of metastatic disease.  VESSELS: Left upper lobe subsegmental pulmonary arterial embolus.   Pulmonary arterial embolus within the right middle lobe medial segmental   pulmonary arterial branch extending into the subsegmental pulmonary   arteries. Right upper lobe subsegmental pulmonary arterial embolism.  HEART: Heart size is normal. No pericardial effusion.  MEDIASTINUM AND MICHAEL: Mildly enlarged mediastinal and hilar lymph nodes.   For reference there is a right paratracheal mildly enlarged 1.2 cm lymph   node.  CHEST WALL AND LOWER NECK: Calcifications and nodularity of the right   breast.    ABDOMEN AND PELVIS:  LIVER: Multiple hypodense and heterogenous hepatic lesions representing   metastatic given history. One of the larger hepatic lesions measuring   about 6.1 cm.  BILE DUCTS: Normal caliber.  GALLBLADDER: Cholecystectomy.  SPLEEN: Within normal limits.  PANCREAS: Hypodense lesions at the junction of the pancreatic head and   body, part of which measures about 1.3 cm. There is no pancreatic ductal   dilatation. There is no peripancreatic inflammatory fat stranding.  ADRENALS: Within normal limits.  KIDNEYS/URETERS: Bilateral renal cysts. No hydronephrosis.    BLADDER: Within normal limits.  REPRODUCTIVE ORGANS: Uterus is within normal limits.    BOWEL: No bowel obstruction. Appendix is not visualized. No evidence of   inflammation in the pericecal region. Stool burden is noted throughout   the colon.  PERITONEUM: No ascites.  VESSELS: Atherosclerotic changes.  RETROPERITONEUM/LYMPH NODES: Enlarged lymph nodes in the portacaval   region part of which measures about 2.5 x 1.1 cm.  ABDOMINAL WALL: Within normal limits.  BONES: Age-indeterminate compression deformity of T4 with some   retropulsion of the fractured vertebral body into the spinal canal. Lytic   lesion involving the right ninth rib representing metastatic disease.   Degenerative changes.    IMPRESSION:  Segmental and subsegmental acute pulmonary arterial emboli as detailed   above. Presence of pulmonary embolism was discussed with Dr. Aguirre by Dr. Orta on September 13, 2022 at 8:03 PM.    Extensive metastatic disease as detailed above.    Small multiloculated bilateral pleural effusions.    Compression fracture deformity of the T4 vertebral body with some   retropulsion of the fractured vertebral body into the spinal canal.   Dedicated thoracic spine MRI can be performed for complete evaluation as   clinically warranted.        < end of copied text >

## 2022-09-21 NOTE — CONSULT NOTE ADULT - ASSESSMENT
75 yo F        h/o colon CA with  mets      was on   chemo ,  no longer on oral chemo x  past  1 mo     p/w AMS x4 weeks, worsening over the last few days.     had also been c/o right sided rib pain for weeks as well.sob  on exertion x days.     pmd  d r brand   sent pt in for further evaluation as pt presented to her with AMS, worsening weakness and lethargy.     Per , pt slipped and fell in the bathroom while getting up from the toilet 3 weeks ago.    Unsure if she had any LOC. Has had unsteady gait since.      +intermittent episodes of diarrhea     Denies nausea, vomiting, fever, chills, cough, chest pain, blood in stool, urinary complaints, headache. (13 Sep 2022 20:18)    CTH: mult hyperdense lesions c/f mets (?heme). CT CAP showing acute PE, pleural effusions, ext mets disease, T4 VB comp fx w/ retropulsion  MRI brain with multiple brain mets:  neurosurgery recommend Rad onc for radiosurgery. C  -TLSO brace when OOB for comfort  -Pt was started on Dex 4q6 and Keppra 500bid. Blood cultures were negative   - Pt with DVT and PE IVC filter placed 9/16 9/19 seen by radiation oncology : The plan as discussed with Dr. Tran/ Dr. Nathan is outpatient gammaknife radiosurgery after discharge.  This is done at [address]  family has refused palliative care/comfort care. Pt is not DNR  ID is now called for elevated WBC      A/P    # Elevated WBC  - most likely etiology is from steroids but cannot r/o infectious cause without some sort of investigation. Pt is not coughing   . The is no phlebitis and patient denies urinary sxs or diarrhea  Could check BC x 2 sets  - check u/a  check CXR ( pt could be aspirating)  and check stool for c diff , GI PCR if develops diarrhea  WBC could also be in response to extensive mets, to DVTs, part of leukemoid reaction to the cancer, and as I mentioned from the steroids, in addition to any possible infectious cause or other   If the family wants a full intervention then suggest  BC x 2 sets, CXR, U/a and swallow study and stool c diff and culture (if diarrhea)    Johanna Celaya M.D. ,   please reach via teams   If no answer, or after 5PM/ weekends,  then please call  [phone]    Assessment and plan discussed with the primary team .   75 yo F        h/o colon CA with  mets      was on   chemo ,  no longer on oral chemo x  past  1 mo     p/w AMS x4 weeks, worsening over the last few days.     had also been c/o right sided rib pain for weeks as well.sob  on exertion x days.     pmd  d r brand   sent pt in for further evaluation as pt presented to her with AMS, worsening weakness and lethargy.     Per , pt slipped and fell in the bathroom while getting up from the toilet 3 weeks ago.    Unsure if she had any LOC. Has had unsteady gait since.      +intermittent episodes of diarrhea     Denies nausea, vomiting, fever, chills, cough, chest pain, blood in stool, urinary complaints, headache. (13 Sep 2022 20:18)    CTH: mult hyperdense lesions c/f mets (?heme). CT CAP showing acute PE, pleural effusions, ext mets disease, T4 VB comp fx w/ retropulsion  MRI brain with multiple brain mets:  neurosurgery recommend Rad onc for radiosurgery. C  -TLSO brace when OOB for comfort  -Pt was started on Dex 4q6 and Keppra 500bid. Blood cultures were negative   - Pt with DVT and PE IVC filter placed 9/16 9/19 seen by radiation oncology : The plan as discussed with Dr. Tran/ Dr. Nathan is outpatient gammaknife radiosurgery after discharge.  This is done at [address]  family has refused palliative care/comfort care. Pt is not DNR  ID is now called for elevated WBC      A/P    # Elevated WBC  - most likely etiology is from steroids but cannot r/o infectious cause without some sort of investigation. Pt is not coughing   . The is no phlebitis and patient denies urinary sxs or diarrhea  Could check BC x 2 sets  - check u/a  check CXR ( pt could be aspirating)  and check stool for c diff , GI PCR if develops diarrhea  WBC could also be in response to extensive mets, to DVTs, part of leukemoid reaction to the cancer, and as I mentioned from the steroids, in addition to any possible infectious cause or other   If the family wants a full intervention then suggest  BC x 2 sets, CXR, U/a and swallow study and stool c diff and culture (if diarrhea), and repeat CMP ( LFTs)    Johanna Celaya M.D. ,   please reach via teams   If no answer, or after 5PM/ weekends,  then please call  [phone]    Assessment and plan discussed with the primary team .

## 2022-09-22 NOTE — PROGRESS NOTE ADULT - SUBJECTIVE AND OBJECTIVE BOX
Indication for Geriatrics and Palliative Care Services/INTERVAL HPI: symptom management and GOC in setting of advanced malignancy  SUBJECTIVE AND OBJECTIVE: Pt seen and examined at bedside. Pt awake and alert. Reports feeling tired but states pain is better with medications.     OVERNIGHT EVENTS: Required PRN PO oxycodone 20mgx1 in 24 hours 8am-8am.    DNR on chart:  Allergies    Levaquin ([place])    Intolerances    MEDICATIONS  (STANDING):  dexAMETHasone  Injectable 4 milliGRAM(s) IV Push four times a day  dextrose 5% + sodium chloride 0.45%. 1000 milliLiter(s) (70 mL/Hr) IV Continuous <Continuous>  levETIRAcetam  IVPB 500 milliGRAM(s) IV Intermittent every 12 hours  metoprolol succinate ER 25 milliGRAM(s) Oral daily    MEDICATIONS  (PRN):      ITEMS UNCHECKED ARE NOT PRESENT    PRESENT SYMPTOMS: [ ]Unable to self-report - see [ ] CPOT [ ] PAINADS [ ] RDOS  Source if other than patient:  [ ]Family   [ ]Team     Pain: [x ]yes [ ]no  QOL impact - impacts ADLS  Location - epigastric region and right side of abdomen/flank region                   Aggravating factors - movement  Quality - throbbing  Radiation - none  Timing- constant   Severity (0-10 scale): 10  Minimal acceptable level (0-10 scale): 2-3      CPOT:    https://www.[facility].org/getattachment/bsq06i01-7z6g-2z3r-7y9g-0533w7151f9m/Critical-Care-Pain-Observation-Tool-(CPOT)    Dyspnea:                           [ ]Mild [ ]Moderate [ ]Severe  Anxiety:                             [ ]Mild [ ]Moderate [ ]Severe  Fatigue:                             [ ]Mild [ ]Moderate [ ]Severe  Nausea:                             [ ]Mild [ ]Moderate [ ]Severe  Loss of appetite:              [ ]Mild [ ]Moderate [ ]Severe  Constipation:                    [ ]Mild [ ]Moderate [ ]Severe    PCSSQ[Palliative Care Spiritual Screening Question]   Severity (0-10):  Score of 4 or > indicate consideration of Chaplaincy referral.  Chaplaincy Referral: [ ] yes [x ] refused [ ] following [ ] Deferred     Caregiver Golden? : [ ] yes [x ] no [ ] Deferred [ ] Declined             Social work referral [ ] Patient & Family Centered Care Referral [ ]     Anticipatory Grief present?:  [ ] yes [x ] no  [ ] Deferred                  Social work referral [ ] Chaplaincy Referral[ ]      Other Symptoms:  [x ]All other review of systems negative   [ ]Unable to obtain due to poor mentation    Palliative Performance Status Version 2:   30     %      [email or web address]/files/[email or web address]_performance_[email or web address]_[email or web address]  PHYSICAL EXAM:  Vital Signs Last 24 Hrs  T(C): 36.3 (22 Sep 2022 11:36), Max: 36.3 (21 Sep 2022 19:44)  T(F): 97.4 (22 Sep 2022 11:36), Max: 97.4 (22 Sep 2022 00:54)  HR: 95 (22 Sep 2022 11:36) (89 - 108)  BP: 128/77 (22 Sep 2022 11:36) (125/84 - 142/78)  BP(mean): --  RR: 20 (22 Sep 2022 04:27) (19 - 20)  SpO2: 94% (22 Sep 2022 11:36) (86% - 94%)    Parameters below as of 22 Sep 2022 11:36  Patient On (Oxygen Delivery Method): mask, Venturi    GENERAL:  [x]Alert  [x]Oriented x 3  [ ]Lethargic  [ ]Cachexia  [ ]Unarousable  [x]Verbal  [ ]Non-Verbal  Behavioral:   [ ]Anxiety  [ ]Delirium [ ]Agitation [ ]Other  HEENT:  [x]Normal   [ ]Dry mouth   [ ]ET Tube/Trach  [ ]Oral lesions  PULMONARY:   [x]Clear [ ]Tachypnea  [ ]Audible excessive secretions   [ ]Rhonchi        [ ]Right [ ]Left [ ]Bilateral  [ ]Crackles        [ ]Right [ ]Left [ ]Bilateral  [ ]Wheezing     [ ]Right [ ]Left [ ]Bilateral  [ ]Diminished BS [ ] Right [ ]Left [ ]Bilateral  CARDIOVASCULAR:    [x]Regular [ ]Irregular [ ]Tachy  [ ]Bobo [ ]Murmur [ ]Other  GASTROINTESTINAL:  [x]Soft  [ ]Distended   [x]+BS  [ ]Non tender [x ]Tender  [ ]PEG [ ]OGT/ NGT   Last BM: 9/21 per patient, no bm documented in flowsheets  GENITOURINARY:  [x]Normal [ ]Incontinent   [ ]Oliguria/Anuria   [ ]Shore  MUSCULOSKELETAL:   [ ]Normal   [x]Weakness  [ ]Bed/Wheelchair bound [ ]Edema  NEUROLOGIC:   [x]No focal deficits  [ ] Cognitive impairment  [ ] Dysphagia [ ]Dysarthria [ ] Paresis [ ]Other   SKIN:   [x]Normal  [ ]Rash   [ ]Pressure ulcer(s) [ ]y [ ]n present on admission    CRITICAL CARE:  [ ] Shock Present  [ ]Septic [ ]Cardiogenic [ ]Neurologic [ ]Hypovolemic  [ ]  Vasopressors [ ]  Inotropes   [ ]Respiratory failure present [ ]Mechanical ventilation [ ]Non-invasive ventilatory support [ ]High flow    [ ]Acute  [ ]Chronic [ ]Hypoxic  [ ]Hypercarbic [ ]Other  [ ]Other organ failure     LABS:                                                         13.3   27.96 )-----------( 422      ( 22 Sep 2022 06:56 )             42.7   09-22    132<L>  |  88<L>  |  57<H>  ----------------------------<  142<H>  4.2   |  26  |  0.99    Ca    10.8<H>      22 Sep 2022 06:52    TPro  7.3  /  Alb  3.4  /  TBili  0.7  /  DBili  x   /  AST  37  /  ALT  37  /  AlkPhos  342<H>  09-22          RADIOLOGY & ADDITIONAL STUDIES: < from: MR Head w/wo IV Cont (09.14.22 @ 19:13) >  ACC: [id number] EXAM:  MR BRAIN WAW IC                          PROCEDURE DATE:  09/14/2022          INTERPRETATION:  CLINICAL INDICATION: Brain metastases. Altered mental   status.    TECHNIQUE: Multi-planar multi-sequential MR imaging of the brain was   performed before and after the intravenous administration of contrast.   7.5 ml of Gadavist IV contrast was administered.    COMPARISON: CT head 9/13/2022.    FINDINGS:  Numerous enhancing intracranial lesions throughout the bilateral cerebral   hemispheres and left cerebellum with surrounding edema. The largest   lesions measure 2.8 x 2.2 x 2.8 cm in the right occipital lobe (4-3), 2.7   x 2.0 x 2.4 cm in the right parietal lobe (4-13) and 2.4 x 1.7 x 1.6 cm   and the left inferior cerebellum (3-12). The left inferior cerebellar   lesion partially effaces the caudal aspect of the fourth ventricle and   left foramen of Luschka.    There is partial effacement of the right lateral ventricle secondary to   surrounding vasogenic edema. However there is mild prominence of the left   lateral ventricle with FLAIR hyperintense signal along the frontal and   occipital horns. Mild hydrocephalus with transependymal CSF flow cannot   be ruled out.     No acute infarct or intracranial hemorrhage identified. No extra-axial   fluid collections. The skull base flow voids are present.    The visualized intraorbital contents are normal. The imaged portions of   the paranasal sinuses are clear. The mastoid air cells are clear. The   visualized osseous structures, soft tissues and partially visualized   parotid glands appear normal.    IMPRESSION:    -Extensive intracranial metastatic disease with surrounding vasogenic   edema. Largest lesions are detailed above.    -Left inferior cerebellar lesion partially effaces the caudal aspect of   the fourth ventricle and left foramen Luschka, with possible associated   mild hydrocephalus.    --- End of Report ---            NIKKY LAM MD; Attending Radiologist  This document has been electronically signed. Sep 15 2022 10:08AM    < end of copied text >        Protein Calorie Malnutrition Present: [ ]mild [ ]moderate [ ]severe [ ]underweight [ ]morbid obesity  https://www.andeal.org/vault/2440/web/files/ONC/Table_Clinical%20Characteristics%20to%20Document%20Malnutrition-White%20JV%20et%20al%252747.pdf    Height (cm): 170.2 (09-16-22 @ 16:54)  Weight (kg): 55.3 (09-16-22 @ 16:54)  BMI (kg/m2): 19.1 (09-16-22 @ 16:54)    [ ]PPSV2 < or = 30%  [ ]significant weight loss [ ]poor nutritional intake [ ]anasarca[ ]Artificial Nutrition    Other REFERRALS:  [ ]Hospice  [ ]Child Life  [ ]Social Work  [ ]Case management [ ]Holistic Therapy     Goals of Care Document:EVA Knapp (09-15-22 @ 15:35)  Goals of Care Conversation:   Participants:  · Participants  Patient; Family; Staff  · Spouse  Mayank  · Child(cabrera)  daughter Alissa, son Cuong  · Relative  daughter in law Karrie  · Provider  Dr. Villagomez of Palliative  ·   Navin Royal [facility]    Advance Directives:  · Does patient have Advance Directive  No  · Does Patient Have a Surrogate  No  · Does the Patient have a Court Appointed Guardian (1750[address])  No  · Caregiver:  declines    Conversation Discussion:  · Conversation  Diagnosis; Prognosis; MOLST Discussed; Treatment Options  · Conversation Details  GAP consulted to assist in GOC and active pain/symptom management in the setting of patient with advanced illness. [place] and Dr. Villagomez met with patient and patient's family today for family meeting.    Team first introduced selves and roles in patient care. Patient and family verbalized understanding and were receptive to visit today. Team next inquired into patient understanding of current medical status, and patient demonstrates understanding of status. Patient states that she is aware that she has cancer and notes a functional change immediately prior to admission including weakness and AMS. Patient states that she was previously receiving OP treatment but had stopped approximately three weeks ago due to the effects the treatment was having on patient's status.     Team validated this overview today, and discussed plans for Oncology visit today and input regarding patient's status and next steps in care. Team inquired into patient goals at this time whilst patient and family awaits oncology input, and patient notes feeling uncertain if she would wish to undergo further treatment due to the experience she had prior to stopping treatment. Patient and family note wishing to hear more from oncology about options prior to making any further decisions in care, and team validated this again today.    Team lastly inquired into advance care planning, and inquired what patient might find to be acceptable in the event her heart were to stop. Patient states that she is unsure what she would find to be acceptable, but states she will consider the question further and will discuss amongst her family. Team encouraged this discussion and discussed plans for follow up regarding the topic.    Team lastly discussed patient's symptom burden and regimen that is recommended to assist in care. Patient and family verbalized agreement and understanding, and note that patient symptom management is a paramount concern at present.     GAP will continue to follow this case, and patient remains full code at this time.    What Matters Most To Patient and Family:  · What matters most to patient and family  Time spent with family, pain/symptom management    Personal Advance Directives Treatment Guidelines:   Treatment Guidelines:  · Decision Maker  Patient    Location of Discussion:   Time Spent on Advance Care Planning:  I spent 30 (in minutes) on advance care planning services with the patient.  This time is separate and distinct from any other care management services provided on this date.    Location of Discussion:  · Location of discussion  Face to face      Electronic Signatures:  Navin Royal (MS)  (Signed 15-Sep-2022 15:48)  	Authored: Goals of Care Conversation, Personal Advance Directives Treatment Guidelines, Location of Discussion  Lyn Villagomez)  (Signed 16-Sep-2022 11:20)  	Authored: Goals of Care Conversation      Last Updated: 16-Sep-2022 11:20 by Lyn Villagomez)

## 2022-09-22 NOTE — HISTORY OF PRESENT ILLNESS
[FreeTextEntry1] : 75 yo woman with h/o stage 3 colon ca w/ known mets to lung came to the ED on 9/13/22 having worsening AMS for the last on month. Last chemo reg finished 1 month ago. Has had AMS/confusion x 1 month, worsening over last few days. Has also had unsteady gait x 1 month.  \par \par 9/15/2022 MRI Brain: FINDINGS: Numerous enhancing intracranial lesions throughout the bilateral cerebral hemispheres and left cerebellum with surrounding edema. The largest lesions measure 2.8 x 2.2 x 2.8 cm in the right occipital lobe (4-3), 2.7 x 2.0 x 2.4 cm in the right parietal lobe (4-13) and 2.4 x 1.7 x 1.6 cm and the left inferior cerebellum (3-12). The left inferior cerebellar lesion partially effaces the caudal aspect of the fourth ventricle and left foramen of Luschka.\par \par There is partial effacement of the right lateral ventricle secondary to surrounding vasogenic edema. However there is mild prominence of the left lateral ventricle with FLAIR hyperintense signal along the frontal and occipital horns. Mild hydrocephalus with transependymal CSF flow cannot be ruled out.\par \par No acute infarct or intracranial hemorrhage identified. No extra-axial fluid collections. The skull base flow voids are present.\par \par The visualized intraorbital contents are normal. The imaged portions of the paranasal sinuses are clear. The mastoid air cells are clear. The visualized osseous structures, soft tissues and partially visualized parotid glands appear normal.\par \par IMPRESSION: -Extensive intracranial metastatic disease with surrounding vasogenic edema. Largest lesions are detailed above.\par -Left inferior cerebellar lesion partially effaces the caudal aspect of the fourth ventricle and left foramen Luschka, with possible associated mild hydrocephalus.\par \par Patient discharged 9/21/2022. Presents via telehealth to discuss treatment with Gamma Knife radiosurgery. \par \par \par \par \par

## 2022-09-22 NOTE — PROGRESS NOTE ADULT - ASSESSMENT
75 y/o F with a PMH of colon CA with mets recently completed chemo tx no longer on oral chemo x 1 mo p/w AMS x4 weeks, worsening over the last few days. Has also been c/o right sided rib pain for weeks as well. CT head concerning for hemorrhagic metastasis. Palliative consulted for GOC and symptom management.

## 2022-09-22 NOTE — PROGRESS NOTE ADULT - PROBLEM SELECTOR PLAN 5
Case discussed with primary team and CM.    For acute issues or uncontrolled symptoms please page palliative team.    Lyn Villagomez MD  Geriatrics and Palliative Medicine Attending  [facility] pager: [phone]

## 2022-09-22 NOTE — PROGRESS NOTE ADULT - ASSESSMENT
77 yo F with h/o colon CA with  mets, stated she was dx in 2021 and had radiation from Nov to Dec 2021. She then had infusional chemo till march 2022 and oral pills and then only oral pills. No chemo since siddhartha July/August 2022.  Admitted 9/13/22 with AMS x4 weeks, worsening over the last few days.   She also had  right sided rib pain for weeks as well and sob on exertion.  As  per , pt slipped and fell in the bathroom while getting up from the toilet 3 weeks ago.  Unsure if she had any LOC. Has had unsteady gait since.   Had  +intermittent episodes of diarrhea  She has been found to have brain mets, extensive metastatic disease, No cord compression on MRI spine, pulmonary embolism and DVT.   IVC filter done 9/16/22.  She is on Decadron, keppra and pain meds.  Prognosis is extremely poor.   Extensive brain mets makes her have very poor prognosis in addition to high volume metastatic disease with prior treatments and poor PS.  I had extensive discussion with patient's daughter. She confirmed the history of rectal cancer given by her mother. She stated mets in lung and liver were found after she received RT with chemo pills.  I spoke to Dr. Tawanna West. She had previous treatments with FOLFOX Avastin too and was started on Stivarga recently which she did not tolerate well. She thought patient should get Rt and be palliative care.   RT was considering  Gamma Knife Radiosurgery.   Palliative care has seen here. Patient has remained full code   She was to follow up with Dr. Tawanna Goncalves on discharge .  Had discussion with ARIAN Olmstead today as patient was feeling weaker. As per family they want to continue with active management.   I told her call RT for follow up.  No systemic therapy for colon cancer is planned.   Supportive care to continue.      75 yo F with h/o colon CA with  mets, stated she was dx in 2021 and had radiation from Nov to Dec 2021. She then had infusional chemo till march 2022 and oral pills and then only oral pills. No chemo since siddhartha July/August 2022.  Admitted 9/13/22 with AMS x4 weeks, worsening over the last few days.   She also had  right sided rib pain for weeks as well and sob on exertion.  As  per , pt slipped and fell in the bathroom while getting up from the toilet 3 weeks ago.  Unsure if she had any LOC. Has had unsteady gait since.   Had  +intermittent episodes of diarrhea  She has been found to have brain mets, extensive metastatic disease, No cord compression on MRI spine, pulmonary embolism and DVT.   IVC filter done 9/16/22.  She is on Decadron, keppra and pain meds.  Prognosis is extremely poor.   Extensive brain mets makes her have very poor prognosis in addition to high volume metastatic disease with prior treatments and poor PS.  I had extensive discussion with patient's daughter. She confirmed the history of rectal cancer given by her mother. She stated mets in lung and liver were found after she received RT with chemo pills.  I spoke to Dr. Tawanna West. She had previous treatments with FOLFOX Avastin too and was started on Stivarga recently which she did not tolerate well. She thought patient should get Rt and be palliative care.   RT was considering  Gamma Knife Radiosurgery.  She was to follow up with Dr. Tawanna Goncalves on discharge.  Patient was feeling weaker today.  RT was re consulted.  They had discussion with patient's daughter and told her that only WBRT can be given as inpatient in [facility].   No systemic therapy for colon cancer is planned.   Palliative care is following.  I called patient's daughter too and left a message, confirming my conversation with her primary oncologist and no plan for systemic therapy  Plan will be with consensus between patient's daughter Alissa and RT  Supportive care to continue.

## 2022-09-22 NOTE — PROGRESS NOTE ADULT - SUBJECTIVE AND OBJECTIVE BOX
afberile/  weak    REVIEW OF SYSTEMS:  GEN: no fever,    no chills  RESP: no SOB,   no cough  CVS: no chest pain,   no palpitations  GI: no abdominal pain,   no nausea,   no vomiting,   no constipation,   no diarrhea  : no dysuria,   no frequency  NEURO: no headache,   no dizziness  PSYCH: no depression,   not anxious  Derm : no rash    MEDICATIONS  (STANDING):  dexAMETHasone     Tablet 4 milliGRAM(s) Oral every 6 hours  furosemide   Injectable 20 milliGRAM(s) IV Push daily  levETIRAcetam 500 milliGRAM(s) Oral two times a day  metoprolol succinate ER 25 milliGRAM(s) Oral daily  morphine ER Tablet 15 milliGRAM(s) Oral every 12 hours  pantoprazole    Tablet 40 milliGRAM(s) Oral before breakfast  polyethylene glycol 3350 17 Gram(s) Oral daily  senna 2 Tablet(s) Oral at bedtime    MEDICATIONS  (PRN):  oxyCODONE    IR 15 milliGRAM(s) Oral every 4 hours PRN Moderate Pain (4 - 6)  oxyCODONE    IR 20 milliGRAM(s) Oral every 4 hours PRN Severe Pain (7 - 10)      Vital Signs Last 24 Hrs  T(C): 36.3 (22 Sep 2022 04:27), Max: 36.5 (21 Sep 2022 13:13)  T(F): 97.3 (22 Sep 2022 04:27), Max: 97.7 (21 Sep 2022 13:13)  HR: 96 (22 Sep 2022 04:27) (85 - 108)  BP: 140/84 (22 Sep 2022 04:27) (125/84 - 142/78)  BP(mean): --  RR: 20 (22 Sep 2022 04:27) (18 - 21)  SpO2: 91% (22 Sep 2022 04:27) (85% - 93%)    Parameters below as of 22 Sep 2022 04:27  Patient On (Oxygen Delivery Method): mask, Venturi      CAPILLARY BLOOD GLUCOSE        I&O's Summary    20 Sep 2022 07:01  -  21 Sep 2022 07:00  --------------------------------------------------------  IN: 870 mL / OUT: 950 mL / NET: -80 mL    21 Sep 2022 07:01  -  22 Sep 2022 06:46  --------------------------------------------------------  IN: 480 mL / OUT: 0 mL / NET: 480 mL        PHYSICAL EXAM:  HEAD:  Atraumatic, Normocephalic  NECK: Supple, No   JVD  CHEST/LUNG:   no     rales,     no,    rhonchi  HEART: Regular rate and rhythm;         murmur  ABDOMEN: Soft, Nontender, ;   EXTREMITIES:   no     edema  NEUROLOGY:  alert    LABS:                        13.4   25.48 )-----------( 453      ( 21 Sep 2022 07:05 )             41.3     09-20    133<L>  |  92<L>  |  36<H>  ----------------------------<  123<H>  4.1   |  27  |  0.52    Ca    10.2      20 Sep 2022 06:54                              Consultant(s) Notes Reviewed:      Care Discussed with Consultants/Other Providers:       afberile/  weak/  obtunded    REVIEW OF SYSTEMS:  GEN: no fever,    no chills  RESP: no SOB,   no cough  CVS: no chest pain,   no palpitations  GI: no abdominal pain,   no nausea,   no vomiting,   no constipation,   no diarrhea  : no dysuria,   no frequency  NEURO: no headache,   no dizziness  PSYCH: no depression,   not anxious  Derm : no rash    MEDICATIONS  (STANDING):  dexAMETHasone     Tablet 4 milliGRAM(s) Oral every 6 hours  furosemide   Injectable 20 milliGRAM(s) IV Push daily  levETIRAcetam 500 milliGRAM(s) Oral two times a day  metoprolol succinate ER 25 milliGRAM(s) Oral daily  morphine ER Tablet 15 milliGRAM(s) Oral every 12 hours  pantoprazole    Tablet 40 milliGRAM(s) Oral before breakfast  polyethylene glycol 3350 17 Gram(s) Oral daily  senna 2 Tablet(s) Oral at bedtime    MEDICATIONS  (PRN):  oxyCODONE    IR 15 milliGRAM(s) Oral every 4 hours PRN Moderate Pain (4 - 6)  oxyCODONE    IR 20 milliGRAM(s) Oral every 4 hours PRN Severe Pain (7 - 10)      Vital Signs Last 24 Hrs  T(C): 36.3 (22 Sep 2022 04:27), Max: 36.5 (21 Sep 2022 13:13)  T(F): 97.3 (22 Sep 2022 04:27), Max: 97.7 (21 Sep 2022 13:13)  HR: 96 (22 Sep 2022 04:27) (85 - 108)  BP: 140/84 (22 Sep 2022 04:27) (125/84 - 142/78)  BP(mean): --  RR: 20 (22 Sep 2022 04:27) (18 - 21)  SpO2: 91% (22 Sep 2022 04:27) (85% - 93%)    Parameters below as of 22 Sep 2022 04:27  Patient On (Oxygen Delivery Method): mask, Venturi      CAPILLARY BLOOD GLUCOSE        I&O's Summary    20 Sep 2022 07:01  -  21 Sep 2022 07:00  --------------------------------------------------------  IN: 870 mL / OUT: 950 mL / NET: -80 mL    21 Sep 2022 07:01  -  22 Sep 2022 06:46  --------------------------------------------------------  IN: 480 mL / OUT: 0 mL / NET: 480 mL        PHYSICAL EXAM:  HEAD:  Atraumatic, Normocephalic  NECK: Supple, No   JVD  CHEST/LUNG:   no     rales,     no,    rhonchi  HEART: Regular rate and rhythm;         murmur  ABDOMEN: Soft, Nontender, ;   EXTREMITIES:   no     edema  NEUROLOGY:  alert    LABS:                        13.4   25.48 )-----------( 453      ( 21 Sep 2022 07:05 )             41.3     09-20    133<L>  |  92<L>  |  36<H>  ----------------------------<  123<H>  4.1   |  27  |  0.52    Ca    10.2      20 Sep 2022 06:54                              Consultant(s) Notes Reviewed:      Care Discussed with Consultants/Other Providers:       afberile/  weak/  obtunded    REVIEW OF SYSTEMS:  GEN: no fever,    no chills  RESP: no SOB,   no cough  CVS: no chest pain,   no palpitations  GI: no abdominal pain,   no nausea,   no vomiting,   no constipation,   no diarrhea  : no dysuria,   no frequency  NEURO: no headache,   no dizziness  PSYCH: no depression,   not anxious  Derm : no rash    MEDICATIONS  (STANDING):  dexAMETHasone     Tablet 4 milliGRAM(s) Oral every 6 hours  furosemide   Injectable 20 milliGRAM(s) IV Push daily  levETIRAcetam 500 milliGRAM(s) Oral two times a day  metoprolol succinate ER 25 milliGRAM(s) Oral daily  morphine ER Tablet 15 milliGRAM(s) Oral every 12 hours  pantoprazole    Tablet 40 milliGRAM(s) Oral before breakfast  polyethylene glycol 3350 17 Gram(s) Oral daily  senna 2 Tablet(s) Oral at bedtime    MEDICATIONS  (PRN):  oxyCODONE    IR 15 milliGRAM(s) Oral every 4 hours PRN Moderate Pain (4 - 6)  oxyCODONE    IR 20 milliGRAM(s) Oral every 4 hours PRN Severe Pain (7 - 10)      Vital Signs Last 24 Hrs  T(C): 36.3 (22 Sep 2022 04:27), Max: 36.5 (21 Sep 2022 13:13)  T(F): 97.3 (22 Sep 2022 04:27), Max: 97.7 (21 Sep 2022 13:13)  HR: 96 (22 Sep 2022 04:27) (85 - 108)  BP: 140/84 (22 Sep 2022 04:27) (125/84 - 142/78)  BP(mean): --  RR: 20 (22 Sep 2022 04:27) (18 - 21)  SpO2: 91% (22 Sep 2022 04:27) (85% - 93%)    Parameters below as of 22 Sep 2022 04:27  Patient On (Oxygen Delivery Method): mask, Venturi      CAPILLARY BLOOD GLUCOSE        I&O's Summary    20 Sep 2022 07:01  -  21 Sep 2022 07:00  --------------------------------------------------------  IN: 870 mL / OUT: 950 mL / NET: -80 mL    21 Sep 2022 07:01  -  22 Sep 2022 06:46  --------------------------------------------------------  IN: 480 mL / OUT: 0 mL / NET: 480 mL        PHYSICAL EXAM:  HEAD:  Atraumatic, Normocephalic  NECK: Supple, No   JVD  CHEST/LUNG:   no     rales,     no,    rhonchi  HEART: Regular rate and rhythm;         murmur  ABDOMEN: Soft, Nontender, ;   EXTREMITIES:   no     edema  NEUROLOGY:    obtunded    LABS:                        13.4   25.48 )-----------( 453      ( 21 Sep 2022 07:05 )             41.3     09-20    133<L>  |  92<L>  |  36<H>  ----------------------------<  123<H>  4.1   |  27  |  0.52    Ca    10.2      20 Sep 2022 06:54                              Consultant(s) Notes Reviewed:      Care Discussed with Consultants/Other Providers:

## 2022-09-22 NOTE — PROGRESS NOTE ADULT - PROBLEM SELECTOR PLAN 1
Pain medications d/c by primary team.  Recommend following medications with holding parameters for sedation and respiratory depression:  MS Contin 15mg PO BID  Oxycodone 15mg q4h PRN moderate pain  Oxycodone 20mg q4h PRN severe pain  bowel regimen

## 2022-09-22 NOTE — PROGRESS NOTE ADULT - SUBJECTIVE AND OBJECTIVE BOX
77 yo F with h/o colon CA with  mets was on  oral chemo, but not for past 1 mo  Admitted 9/13/22 with AMS x4 weeks, worsening over the last few days.   She also had  right sided rib pain for weeks as well and sob on exertion.  As  per , pt slipped and fell in the bathroom while getting up from the toilet 3 weeks ago.  Unsure if she had any LOC. Has had unsteady gait since.   Had  +intermittent episodes of diarrhea  Denied nausea, vomiting, fever, chills, cough, chest pain, blood in stool, urinary complaints, headache.   Followed for metastatic colon cancer and brain mets    PAST MEDICAL & SURGICAL HISTORY:  COPD (chronic obstructive pulmonary disease)  mild      Colon cancer      History of cholecystectomy  2000      Bone spur  Lt foot 5th digit 2014      H/O bilateral breast biopsy  2002      S/P tonsillectomy  childhood        Allergies    Levaquin ([place])    Intolerances      Social History:    Medications:  dexAMETHasone  Injectable 4 milliGRAM(s) IV Push four times a day  dextrose 5% + sodium chloride 0.45%. 1000 milliLiter(s) IV Continuous <Continuous>  levETIRAcetam  IVPB 500 milliGRAM(s) IV Intermittent every 12 hours  metoprolol succinate ER 25 milliGRAM(s) Oral daily    Labs:  CBC Full  -  ( 22 Sep 2022 06:56 )  WBC Count : 27.96 K/uL  RBC Count : 4.81 M/uL  Hemoglobin : 13.3 g/dL  Hematocrit : 42.7 %  Platelet Count - Automated : 422 K/uL  Mean Cell Volume : 88.8 fl  Mean Cell Hemoglobin : 27.7 pg  Mean Cell Hemoglobin Concentration : 31.1 gm/dL  Auto Neutrophil # : x  Auto Lymphocyte # : x  Auto Monocyte # : x  Auto Eosinophil # : x  Auto Basophil # : x  Auto Neutrophil % : x  Auto Lymphocyte % : x  Auto Monocyte % : x  Auto Eosinophil % : x  Auto Basophil % : x    09-22    132<L>  |  88<L>  |  57<H>  ----------------------------<  142<H>  4.2   |  26  |  0.99    Ca    10.8<H>      22 Sep 2022 06:52    TPro  7.3  /  Alb  3.4  /  TBili  0.7  /  DBili  x   /  AST  37  /  ALT  37  /  AlkPhos  342<H>  09-22      Radiology:             ROS:  Patient comfortable without distress  No SOB or chest pain  No palpitation  No abdominal pain, diarrhaea or constipation  No weakness of extremities  No skin changes or swelling of legs  Rest of the comprehensive ROS was negative  Vital Signs Last 24 Hrs  T(C): 36.3 (22 Sep 2022 11:36), Max: 36.5 (21 Sep 2022 13:13)  T(F): 97.4 (22 Sep 2022 11:36), Max: 97.7 (21 Sep 2022 13:13)  HR: 95 (22 Sep 2022 11:36) (85 - 108)  BP: 128/77 (22 Sep 2022 11:36) (125/84 - 142/78)  BP(mean): --  RR: 20 (22 Sep 2022 04:27) (18 - 21)  SpO2: 94% (22 Sep 2022 11:36) (85% - 94%)    Parameters below as of 22 Sep 2022 11:36  Patient On (Oxygen Delivery Method): mask, Venturi        Physical exam:  Patient alert and oriented  No distress  CVS: S1, S2 regular or murmur  Chest: bilateral breath sound without rales  Abdomen: soft, not tender, no organomegaly or masses  CNS: No focal neuro deficit  Musculoskeletal:  Normal range of motion  Skin: No rash    Assessment and Plan: 77 yo F with h/o colon CA with  mets was on  oral chemo, but not for past 1 mo  Admitted 9/13/22 with AMS x4 weeks, worsening over the last few days.   She also had  right sided rib pain for weeks as well and sob on exertion.  As  per , pt slipped and fell in the bathroom while getting up from the toilet 3 weeks ago.  Unsure if she had any LOC. Has had unsteady gait since.   Had  +intermittent episodes of diarrhea  Denied nausea, vomiting, fever, chills, cough, chest pain, blood in stool, urinary complaints, headache.   Followed for metastatic colon cancer and brain mets    PAST MEDICAL & SURGICAL HISTORY:  COPD (chronic obstructive pulmonary disease)  mild      Colon cancer      History of cholecystectomy  2000      Bone spur  Lt foot 5th digit 2014      H/O bilateral breast biopsy  2002      S/P tonsillectomy  childhood        Allergies    Levaquin ([place])    Intolerances      Social History:    Medications:  dexAMETHasone  Injectable 4 milliGRAM(s) IV Push four times a day  dextrose 5% + sodium chloride 0.45%. 1000 milliLiter(s) IV Continuous <Continuous>  levETIRAcetam  IVPB 500 milliGRAM(s) IV Intermittent every 12 hours  metoprolol succinate ER 25 milliGRAM(s) Oral daily    Labs:  CBC Full  -  ( 22 Sep 2022 06:56 )  WBC Count : 27.96 K/uL  RBC Count : 4.81 M/uL  Hemoglobin : 13.3 g/dL  Hematocrit : 42.7 %  Platelet Count - Automated : 422 K/uL  Mean Cell Volume : 88.8 fl  Mean Cell Hemoglobin : 27.7 pg  Mean Cell Hemoglobin Concentration : 31.1 gm/dL  Auto Neutrophil # : x  Auto Lymphocyte # : x  Auto Monocyte # : x  Auto Eosinophil # : x  Auto Basophil # : x  Auto Neutrophil % : x  Auto Lymphocyte % : x  Auto Monocyte % : x  Auto Eosinophil % : x  Auto Basophil % : x    09-22    132<L>  |  88<L>  |  57<H>  ----------------------------<  142<H>  4.2   |  26  |  0.99    Ca    10.8<H>      22 Sep 2022 06:52    TPro  7.3  /  Alb  3.4  /  TBili  0.7  /  DBili  x   /  AST  37  /  ALT  37  /  AlkPhos  342<H>  09-22      Radiology:             ROS:  Patient feels weaker  Needed pain meds  No SOB   No palpitation  No abdominal pain, diarrhaea or constipation  No weakness of extremities, general weakness  No skin changes or swelling of legs  Rest of the comprehensive ROS was negative  Vital Signs Last 24 Hrs  T(C): 36.3 (22 Sep 2022 11:36), Max: 36.5 (21 Sep 2022 13:13)  T(F): 97.4 (22 Sep 2022 11:36), Max: 97.7 (21 Sep 2022 13:13)  HR: 95 (22 Sep 2022 11:36) (85 - 108)  BP: 128/77 (22 Sep 2022 11:36) (125/84 - 142/78)  BP(mean): --  RR: 20 (22 Sep 2022 04:27) (18 - 21)  SpO2: 94% (22 Sep 2022 11:36) (85% - 94%)    Parameters below as of 22 Sep 2022 11:36  Patient On (Oxygen Delivery Method): mask, Venturi        Physical exam:  Patient alert but looks weak  No distress  CVS: S1, S2   Chest: bilateral breath sound without rales  Abdomen: soft, not tender, no organomegaly or masses  CNS: No focal neuro deficit  Musculoskeletal:  Normal range of motion  Skin: No rash    Assessment and Plan:

## 2022-09-22 NOTE — PROGRESS NOTE ADULT - SUBJECTIVE AND OBJECTIVE BOX
CARDIOLOGY     PROGRESS  NOTE   ________________________________________________    CHIEF COMPLAINT:Patient is a 76y old  Female who presents with a chief complaint of ams/ sob (22 Sep 2022 06:46)  no complain.  	  REVIEW OF SYSTEMS:  CONSTITUTIONAL: No fever, weight loss, or fatigue  EYES: No eye pain, visual disturbances, or discharge  ENT:  No difficulty hearing, tinnitus, vertigo; No sinus or throat pain  NECK: No pain or stiffness  RESPIRATORY: No cough, wheezing, chills or hemoptysis; No Shortness of Breath  CARDIOVASCULAR: No chest pain, palpitations, passing out, dizziness, or leg swelling  GASTROINTESTINAL: No abdominal or epigastric pain. No nausea, vomiting, or hematemesis; No diarrhea or constipation. No melena or hematochezia.  GENITOURINARY: No dysuria, frequency, hematuria, or incontinence  NEUROLOGICAL: No headaches, memory loss, loss of strength, numbness, or tremors  SKIN: No itching, burning, rashes, or lesions   LYMPH Nodes: No enlarged glands  ENDOCRINE: No heat or cold intolerance; No hair loss  MUSCULOSKELETAL: No joint pain or swelling; No muscle, back, or extremity pain  PSYCHIATRIC: No depression, anxiety, mood swings, or difficulty sleeping  HEME/LYMPH: No easy bruising, or bleeding gums  ALLERGY AND IMMUNOLOGIC: No hives or eczema	    [ ] All others negative	  [x] Unable to obtain    PHYSICAL EXAM:  T(C): 36.3 (09-22-22 @ 04:27), Max: 36.5 (09-21-22 @ 13:13)  HR: 96 (09-22-22 @ 04:27) (85 - 108)  BP: 140/84 (09-22-22 @ 04:27) (125/84 - 142/78)  RR: 20 (09-22-22 @ 04:27) (18 - 21)  SpO2: 91% (09-22-22 @ 04:27) (85% - 93%)  Wt(kg): --  I&O's Summary    21 Sep 2022 07:01  -  22 Sep 2022 07:00  --------------------------------------------------------  IN: 480 mL / OUT: 0 mL / NET: 480 mL        Appearance: Normal	  HEENT:   Normal oral mucosa, PERRL, EOMI	  Lymphatic: No lymphadenopathy  Cardiovascular: Normal S1 S2, No JVD, + murmurs, No edema  Respiratory: Lungs clear to auscultation	  Gastrointestinal:  Soft, Non-tender, + BS	  Skin: No rashes, No ecchymoses, No cyanosis	  Neurologic: Non-focal  Extremities: Normal range of motion, No clubbing, cyanosis or edema  Vascular: Peripheral pulses palpable 2+ bilaterally    MEDICATIONS  (STANDING):  dexAMETHasone     Tablet 4 milliGRAM(s) Oral every 6 hours  furosemide   Injectable 20 milliGRAM(s) IV Push daily  levETIRAcetam 500 milliGRAM(s) Oral two times a day  metoprolol succinate ER 25 milliGRAM(s) Oral daily  morphine ER Tablet 15 milliGRAM(s) Oral every 12 hours  pantoprazole    Tablet 40 milliGRAM(s) Oral before breakfast  polyethylene glycol 3350 17 Gram(s) Oral daily  senna 2 Tablet(s) Oral at bedtime      TELEMETRY: 	    ECG:  	  RADIOLOGY:  OTHER: 	  	  LABS:	 	    CARDIAC MARKERS:                                13.3   27.96 )-----------( 422      ( 22 Sep 2022 06:56 )             42.7     09-22    132<L>  |  88<L>  |  57<H>  ----------------------------<  142<H>  4.2   |  26  |  0.99    Ca    10.8<H>      22 Sep 2022 06:52    TPro  7.3  /  Alb  3.4  /  TBili  0.7  /  DBili  x   /  AST  37  /  ALT  37  /  AlkPhos  342<H>  09-22    proBNP: Serum Pro-Brain Natriuretic Peptide: 2287 pg/mL (09-13 @ 18:51)    Lipid Profile:   HgA1c:   TSH:         Assessment and plan  ---------------------------  77 yo F h/o colon CA with  mets was on   chemo ,  no longer on oral chemo x  past  1 mo p/w AMS x4 weeks, worsening over the last few days.     had also been c/o right sided rib pain for weeks as well.sob  on exertion x days.     pmd  d r brand   sent pt in for further evaluation as pt presented to her with AMS, worsening weakness and lethargy.     Per , pt slipped and fell in the bathroom while getting up from the toilet 3 weeks ago.    Unsure if she had any LOC. Has had unsteady gait since.      +intermittent episodes of diarrhea     Denies nausea, vomiting, fever, chills, cough, chest pain, blood in stool, urinary complaints, headache.   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  AC contraindicated  pt needs IVC filter any way despite doppler  echo in er noted with normal ef and bl pleural effusion with increase pro bnp  dc ivf  increase beta blocker for bp control, controlled now  PE on no AC sec to brain hemorrhagic mets  palliative care noted

## 2022-09-22 NOTE — PROGRESS NOTE ADULT - ASSESSMENT
75 yo F        h/o colon CA with  mets    no longer on oral chemo x  past  1 mo     p/w AMS x4 weeks, worsening over the last few days.  had  c/o right sided rib pain for weeks  and .sob  on exertion x days.     pmd  dr coffman   sent pt in ,  presented to her with AMS, worsening weakness and lethargy.     Per , pt slipped and fell in the bathroom while getting up from the toilet 3 weeks ago.       admitted   with  ams.  from cerebral   mets  with bleed.  able to  converse   h/o  metastatic Ca colon, oncologist dr jose enrique ignacio   N/S  eval, no intervention,   pt has no focal weakness   CT  chest angio. :  ,Pe, b/l pl effusions, hepatic mets /   T4 comp fx, with bony retropulsion,     TLSO  brace  per  N/S     pt  with +   PE/ R  leg  DVT,   unable to  a/c pt , given cerebral bleed/ family  aware of risks   wbc  noted. from malignancy  afebrile , , fall risk/ PT eval   oncology dr bullock  ,  on Decadron  for  h'gic mets, seen by  N/S         aware of  poor prognosis.  family   had  meeting  with palliative care, on 9/ 15,  remains  full code    s/p   IVC filter  by  IR  rx  plan per  dr bullock  is  palliative  in nature,  RT  to  brain / agree  with  oncology  that,  pt  ideal  candidate  for  comfort  care/  hospice  spoke with  daughter ,  pt remains  full  code  pain meds per  palliative  care team/   continue  current care  per  daughter   will  decide   on further   trajectory  after  meeting with  consultants    pe  N/S,  gammaknife  rt  a s an out pt  on dcedron/  morphine    elevated  wbc  from decadron. pt is  afberile    daughter  wants  pt  home.,  pt is  bed bound  now   labs pending/  w/p per  ID . in progress. bcx, pending         rad< from: CT Head No Cont (09.13.22 @ 18:46) >  RESSION:  Multiple hyperdense nodules are may represent hemorrhagic metastasis in a   patient with history of colon carcinoma. Further evaluation with contrast   enhanced brain MRI is recommended.  No acute intracranial hemorrhage.  --- End of Report ---       RAD< from: CT Abdomen and Pelvis w/ IV Cont (09.13.22 @ 18:48) >  IMPRESSION:  Segmental and subsegmental acute pulmonary arterial emboli as detailed   above. Presence of pulmonary embolism was discussed with Dr. Aguirre by Dr. Orta on September 13, 2022 at 8:03 PM.  Extensive metastatic disease as detailed above.  Small multiloculated bilateral pleural effusions.  Compression fracture deformity of the T4 vertebral body with some   retropulsion of the fractured vertebral body into the spinal canal.   Dedicated thoracic spine MRI can be performed for complete evaluation as   clinically warranted.  --- End of Report ---                   77 yo F        h/o colon CA with  mets    no longer on oral chemo x  past  1 mo     p/w AMS x4 weeks, worsening over the last few days.  had  c/o right sided rib pain for weeks  and .sob  on exertion x days.     pmd  dr coffman   sent pt in ,  presented to her with AMS, worsening weakness and lethargy.     Per , pt slipped and fell in the bathroom while getting up from the toilet 3 weeks ago.       admitted   with  ams.  from cerebral   mets  with bleed.  able to  converse   h/o  metastatic Ca colon, oncologist dr jose enrique ignacio   N/S  eval, no intervention,   pt has no focal weakness   CT  chest angio. :  ,Pe, b/l pl effusions, hepatic mets /   T4 comp fx, with bony retropulsion,     TLSO  brace  per  N/S     pt  with +   PE/ R  leg  DVT,   unable to  a/c pt , given cerebral bleed/ family  aware of risks   wbc  noted. from malignancy  afebrile , , fall risk/ PT eval   oncology dr bullock  ,  on Decadron  for  h'gic mets, seen by  N/S         aware of  poor prognosis.  family   had  meeting  with palliative care, on 9/ 15,  remains  full code    s/p   IVC filter  by  IR  rx  plan per  dr bullock  is  palliative  in nature,  RT  to  brain / agree  with  oncology  that,  pt  ideal  candidate  for  comfort  care/  hospice  spoke with  daughter ,  pt remains  full  code  pain meds per  palliative  care team/   continue  current care  per  daughter   will  decide   on further   trajectory  after  meeting with  consultants    pe  N/S,  gammaknife  rt  a s an out pt  on dcedron/  morphine    elevated  wbc  from decadron. pt is  afberile    daughter  wants  pt  home.,  pt is  bed bound  now   labs pending/  w/p per  ID . in progress. bcx, pending   pt ha s declined. obtunded/ on 0xygen.  pt  appears   pre  terminal, risk of  aspiration/  npo   daughter   wants  all done/   difficult situation/  discussed   with  team/  NP. pt remains   full code   ideally, pt ought  to be a  hospice  candidate         rad< from: CT Head No Cont (09.13.22 @ 18:46) >  RESSION:  Multiple hyperdense nodules are may represent hemorrhagic metastasis in a   patient with history of colon carcinoma. Further evaluation with contrast   enhanced brain MRI is recommended.  No acute intracranial hemorrhage.  --- End of Report ---       RAD< from: CT Abdomen and Pelvis w/ IV Cont (09.13.22 @ 18:48) >  IMPRESSION:  Segmental and subsegmental acute pulmonary arterial emboli as detailed   above. Presence of pulmonary embolism was discussed with Dr. Aguirre by Dr. Orta on September 13, 2022 at 8:03 PM.  Extensive metastatic disease as detailed above.  Small multiloculated bilateral pleural effusions.  Compression fracture deformity of the T4 vertebral body with some   retropulsion of the fractured vertebral body into the spinal canal.   Dedicated thoracic spine MRI can be performed for complete evaluation as   clinically warranted.  --- End of Report ---                   75 yo F        h/o colon CA with  mets    no longer on oral chemo x  past  1 mo     p/w AMS x4 weeks, worsening over the last few days.  had  c/o right sided rib pain for weeks  and .sob  on exertion x days.     pmd  dr coffman   sent pt in ,  presented to her with AMS, worsening weakness and lethargy.     Per , pt slipped and fell in the bathroom while getting up from the toilet 3 weeks ago.       admitted   with  ams.  from cerebral   mets  with bleed.  able to  converse   h/o  metastatic Ca colon, oncologist dr jose enrique ignacio   N/S  eval, no intervention,   pt has no focal weakness   CT  chest angio. :  ,Pe, b/l pl effusions, hepatic mets /   T4 comp fx, with bony retropulsion,     TLSO  brace  per  N/S     pt  with +   PE/ R  leg  DVT,   unable to  a/c pt , given cerebral bleed/ family  aware of risks   wbc  noted. from malignancy  afebrile , , fall risk/ PT eval   oncology dr bullock  ,  on Decadron  for  h'gic mets, seen by  N/S         aware of  poor prognosis.  family   had  meeting  with palliative care, on 9/ 15,  remains  full code    s/p   IVC filter  by  IR  rx  plan per  dr bullock  is  palliative  in nature,  RT  to  brain / agree  with  oncology  that,  pt  ideal  candidate  for  comfort  care/  hospice  spoke with  daughter ,  pt remains  full  code  pain meds per  palliative  care team/   continue  current care  per  daughter   will  decide   on further   trajectory  after  meeting with  consultants    pe  N/S,  gammaknife  rt  a s an out pt  on dcedron/  morphine    elevated  wbc  from decadron. pt is  afberile    daughter  wants  pt  home.,  pt is  bed bound  now   labs pending/  w/p per  ID . in progress. bcx, pending   pt ha s declined. obtunded/ on 0xygen.  pt  appears   pre  terminal, risk of  aspiration/  npo   daughter   wants  all done/   difficult situation/  discussed   with  team/  NP. pt remains   full code   ideally, pt ought  to be a  hospice  candidate    daughter  called. , /  message   left           rad< from: CT Head No Cont (09.13.22 @ 18:46) >  RESSION:  Multiple hyperdense nodules are may represent hemorrhagic metastasis in a   patient with history of colon carcinoma. Further evaluation with contrast   enhanced brain MRI is recommended.  No acute intracranial hemorrhage.  --- End of Report ---       RAD< from: CT Abdomen and Pelvis w/ IV Cont (09.13.22 @ 18:48) >  IMPRESSION:  Segmental and subsegmental acute pulmonary arterial emboli as detailed   above. Presence of pulmonary embolism was discussed with Dr. Aguirre by Dr. Orta on September 13, 2022 at 8:03 PM.  Extensive metastatic disease as detailed above.  Small multiloculated bilateral pleural effusions.  Compression fracture deformity of the T4 vertebral body with some   retropulsion of the fractured vertebral body into the spinal canal.   Dedicated thoracic spine MRI can be performed for complete evaluation as   clinically warranted.  --- End of Report ---                   77 yo F        h/o colon CA with  mets    no longer on oral chemo x  past  1 mo     p/w AMS x4 weeks, worsening over the last few days.  had  c/o right sided rib pain for weeks  and .sob  on exertion x days.     pmd  dr coffman   sent pt in ,  presented to her with AMS, worsening weakness and lethargy.     Per , pt slipped and fell in the bathroom while getting up from the toilet 3 weeks ago.       admitted   with  ams.  from cerebral   mets  with bleed.  able to  converse   h/o  metastatic Ca colon, oncologist dr jose enrique ignacio   N/S  eval, no intervention,   pt has no focal weakness   CT  chest angio. :  ,Pe, b/l pl effusions, hepatic mets /   T4 comp fx, with bony retropulsion,     TLSO  brace  per  N/S     pt  with +   PE/ R  leg  DVT,   unable to  a/c pt , given cerebral bleed/ family  aware of risks   wbc  noted. from malignancy  afebrile , , fall risk/ PT eval   oncology dr bullock  ,  on Decadron  for  h'gic mets, seen by  N/S         aware of  poor prognosis.  family   had  meeting  with palliative care, on 9/ 15,  remains  full code    s/p   IVC filter  by  IR  rx  plan per  dr bullock  is  palliative  in nature,  RT  to  brain / agree  with  oncology  that,  pt  ideal  candidate  for  comfort  care/  hospice  spoke with  daughter ,  pt remains  full  code  pain meds per  palliative  care team/   continue  current care  per  daughter   will  decide   on further   trajectory  after  meeting with  consultants    pe  N/S,  gammaknife  rt  a s an out pt  on dcedron/  morphine    elevated  wbc  from decadron. pt is  afberile    daughter  wants  pt  home.,  pt is  bed bound  now   labs pending/  w/p per  ID . in progress. bcx, pending   pt has  declined.  dramatically today,  obtunded/ on 0xygen.  pt  appears   pre  terminal, risk of  aspiration/  npo   daughter   wanted   all done/   difficult situation/  discussed   with  team/  NP. pt remains   full code   ideally, pt ought  to be a  hospice  candidate    daughter  called., unable to  reach ,    message   left     given new   AMS/ obtundation   and that pt is full code,   rrt /  stat  CT head/  discussed   with  NP   Maris ware< from: CT Head No Cont (09.13.22 @ 18:46) >  RESSION:  Multiple hyperdense nodules are may represent hemorrhagic metastasis in a   patient with history of colon carcinoma. Further evaluation with contrast   enhanced brain MRI is recommended.  No acute intracranial hemorrhage.  --- End of Report ---       RAD< from: CT Abdomen and Pelvis w/ IV Cont (09.13.22 @ 18:48) >  IMPRESSION:  Segmental and subsegmental acute pulmonary arterial emboli as detailed   above. Presence of pulmonary embolism was discussed with Dr. Aguirre by Dr. Orta on September 13, 2022 at 8:03 PM.  Extensive metastatic disease as detailed above.  Small multiloculated bilateral pleural effusions.  Compression fracture deformity of the T4 vertebral body with some   retropulsion of the fractured vertebral body into the spinal canal.   Dedicated thoracic spine MRI can be performed for complete evaluation as   clinically warranted.  --- End of Report ---                   75 yo F        h/o colon CA with  mets    no longer on oral chemo x  past  1 mo     p/w AMS x4 weeks, worsening over the last few days.  had  c/o right sided rib pain for weeks  and .sob  on exertion x days.     pmd  dr coffman   sent pt in ,  presented to her with AMS, worsening weakness and lethargy.     Per , pt slipped and fell in the bathroom while getting up from the toilet 3 weeks ago.       admitted   with  ams.  from cerebral   mets  with bleed.  able to  converse   h/o  metastatic Ca colon, oncologist dr jose enrique ignacio   N/S  eval, no intervention,   pt has no focal weakness   CT  chest angio. :  ,Pe, b/l pl effusions, hepatic mets /   T4 comp fx, with bony retropulsion,     TLSO  brace  per  N/S     pt  with +   PE/ R  leg  DVT,   unable to  a/c pt , given cerebral bleed/ family  aware of risks   wbc  noted. from malignancy  afebrile , , fall risk/ PT eval   oncology dr bullock  ,  on Decadron  for  h'gic mets, seen by  N/S         aware of  poor prognosis.  family   had  meeting  with palliative care, on 9/ 15,  remains  full code    s/p   IVC filter  by  IR  rx  plan per  dr bullock  is  palliative  in nature,  RT  to  brain / agree  with  oncology  that,  pt  ideal  candidate  for  comfort  care/  hospice  spoke with  daughter ,  pt remains  full  code  pain meds per  palliative  care team/   continue  current care  per  daughter   will  decide   on further   trajectory  after  meeting with  consultants    pe  N/S,  gammaknife  rt  a s an out pt  on dcedron/  morphine    elevated  wbc  from decadron. pt is  afberile    daughter  wants  pt  home.,  pt is  bed bound  now   labs pending/  w/p per  ID . in progress. bcx, pending   pt has  declined.  dramatically today,  obtunded/ on 0xygen.  pt  appears   pre  terminal, risk of  aspiration/  npo   daughter   wanted   all done/   difficult situation/  discussed   with  team/  NP. pt remains   full code   ideally, pt ought  to be a  hospice  candidate    daughter  called., unable to  reach ,    message   left     given   obtundation   and that pt is full code,    stat  CT head/  discussed   with  NP   Maris ware< from: CT Head No Cont (09.13.22 @ 18:46) >  RESSION:  Multiple hyperdense nodules are may represent hemorrhagic metastasis in a   patient with history of colon carcinoma. Further evaluation with contrast   enhanced brain MRI is recommended.  No acute intracranial hemorrhage.  --- End of Report ---       RAD< from: CT Abdomen and Pelvis w/ IV Cont (09.13.22 @ 18:48) >  IMPRESSION:  Segmental and subsegmental acute pulmonary arterial emboli as detailed   above. Presence of pulmonary embolism was discussed with Dr. Aguirre by Dr. Orta on September 13, 2022 at 8:03 PM.  Extensive metastatic disease as detailed above.  Small multiloculated bilateral pleural effusions.  Compression fracture deformity of the T4 vertebral body with some   retropulsion of the fractured vertebral body into the spinal canal.   Dedicated thoracic spine MRI can be performed for complete evaluation as   clinically warranted.  --- End of Report ---                   77 yo F        h/o colon CA with  mets    no longer on oral chemo x  past  1 mo     p/w AMS x4 weeks, worsening over the last few days.  had  c/o right sided rib pain for weeks  and .sob  on exertion x days.     pmd  dr coffman   sent pt in ,  presented to her with AMS, worsening weakness and lethargy.     Per , pt slipped and fell in the bathroom while getting up from the toilet 3 weeks ago.       admitted   with  ams.  from cerebral   mets  with bleed.  able to  converse   h/o  metastatic Ca colon, oncologist dr jose enrique ignacio   N/S  eval, no intervention,   pt has no focal weakness   CT  chest angio. :  ,Pe, b/l pl effusions, hepatic mets /   T4 comp fx, with bony retropulsion,     TLSO  brace  per  N/S     pt  with +   PE/ R  leg  DVT,   unable to  a/c pt , given cerebral bleed/ family  aware of risks   wbc  noted. from malignancy  afebrile , , fall risk/ PT eval   oncology dr bullock  ,  on Decadron  for  h'gic mets, seen by  N/S         aware of  poor prognosis.  family   had  meeting  with palliative care, on 9/ 15,  remains  full code    s/p   IVC filter  by  IR  rx  plan per  dr bullock  is  palliative  in nature,  RT  to  brain / agree  with  oncology  that,  pt  ideal  candidate  for  comfort  care/  hospice  spoke with  daughter ,  pt remains  full  code  pain meds per  palliative  care team/   continue  current care  per  daughter   will  decide   on further   trajectory  after  meeting with  consultants    pe  N/S,  gammaknife  rt  a s an out pt  on dcedron/  morphine    elevated  wbc  from decadron. pt is  afberile    daughter  wants  pt  home.,  pt is  bed bound  now   labs pending/  w/p per  ID . in progress. bcx, pending   pt has  declined.  dramatically today,  obtunded/ on 0xygen.  pt  appears   pre  terminal, risk of  aspiration/  npo   daughter   wanted   all done/   difficult situation/  discussed   with  team/  NP. pt remains   full code   ideally, pt ought  to be a  hospice  candidate    daughter  called., unable to  reach ,    message   left     given   obtundation   and that pt is full code,    stat  CT head/  discussed   with  NP   Maris     addendum/  spoke  at length  with spouse  and daughter, clearly  stated  that  pt si  full code. and   that  family does not wish to pursue  more  meetings/ this  was  confirmed  by vahe lucio also/ and  alex bullock  has  spoken at length with daughter          rad< from: CT Head No Cont (09.13.22 @ 18:46) >  RESSION:  Multiple hyperdense nodules are may represent hemorrhagic metastasis in a   patient with history of colon carcinoma. Further evaluation with contrast   enhanced brain MRI is recommended.  No acute intracranial hemorrhage.  --- End of Report ---       RAD< from: CT Abdomen and Pelvis w/ IV Cont (09.13.22 @ 18:48) >  IMPRESSION:  Segmental and subsegmental acute pulmonary arterial emboli as detailed   above. Presence of pulmonary embolism was discussed with Dr. Aguirre by Dr. Orta on September 13, 2022 at 8:03 PM.  Extensive metastatic disease as detailed above.  Small multiloculated bilateral pleural effusions.  Compression fracture deformity of the T4 vertebral body with some   retropulsion of the fractured vertebral body into the spinal canal.   Dedicated thoracic spine MRI can be performed for complete evaluation as   clinically warranted.  --- End of Report ---                   75 yo F        h/o colon CA with  mets    no longer on oral chemo x  past  1 mo     p/w AMS x4 weeks, worsening over the last few days.  had  c/o right sided rib pain for weeks  and .sob  on exertion x days.     pmd  dr coffman   sent pt in ,  presented to her with AMS, worsening weakness and lethargy.     Per , pt slipped and fell in the bathroom while getting up from the toilet 3 weeks ago.       admitted   with  ams.  from cerebral   mets  with bleed.  able to  converse   h/o  metastatic Ca colon, oncologist dr jose enrique ignacio   N/S  eval, no intervention,   pt has no focal weakness   CT  chest angio. :  ,Pe, b/l pl effusions, hepatic mets /   T4 comp fx, with bony retropulsion,     TLSO  brace  per  N/S     pt  with +   PE/ R  leg  DVT,   unable to  a/c pt , given cerebral bleed/ family  aware of risks   wbc  noted. from malignancy  afebrile , , fall risk/ PT eval   oncology dr bullock  ,  on Decadron  for  h'gic mets, seen by  N/S         aware of  poor prognosis.  family   had  meeting  with palliative care, on 9/ 15,  remains  full code    s/p   IVC filter  by  IR  rx  plan per  dr bullock  is  palliative  in nature,  RT  to  brain / agree  with  oncology  that,  pt  ideal  candidate  for  comfort  care/  hospice  spoke with  daughter ,  pt remains  full  code  pain meds per  palliative  care team/   continue  current care  per  daughter   will  decide   on further   trajectory  after  meeting with  consultants    pe  N/S,  gammaknife  rt  a s an out pt  on dcedron/  morphine    elevated  wbc  from decadron. pt is  afberile    daughter  wants  pt  home.,  pt is  bed bound  now   labs pending/  w/p per  ID . in progress. bcx, pending   pt has  declined.  dramatically today,  obtunded/ on 0xygen.  pt  appears   pre  terminal, risk of  aspiration/  npo   daughter   wanted   all done/   difficult situation/  discussed   with  team/  NP. pt remains   full code   ideally, pt ought  to be a  hospice  candidate    daughter  called., unable to  reach ,    message   left     given   obtundation   and that pt is full code,    stat  CT head/  discussed   with  NP   Maris     addendum/     spoke  at length  with spouse  and daughter, clearly  stated  that  pt si  full code. and   that  family does not wish to pursue  more  meetings/ this  was  confirmed  by care cortn also/    and  alex bullock  has  spoken at length with daughter     more  alert/ ct head,  stable, on modified  diet now  and  observe         rad< from: CT Head No Cont (09.13.22 @ 18:46) >  RESSION:  Multiple hyperdense nodules are may represent hemorrhagic metastasis in a   patient with history of colon carcinoma. Further evaluation with contrast   enhanced brain MRI is recommended.  No acute intracranial hemorrhage.  --- End of Report ---       RAD< from: CT Abdomen and Pelvis w/ IV Cont (09.13.22 @ 18:48) >  IMPRESSION:  Segmental and subsegmental acute pulmonary arterial emboli as detailed   above. Presence of pulmonary embolism was discussed with Dr. Aguirre by Dr. Orta on September 13, 2022 at 8:03 PM.  Extensive metastatic disease as detailed above.  Small multiloculated bilateral pleural effusions.  Compression fracture deformity of the T4 vertebral body with some   retropulsion of the fractured vertebral body into the spinal canal.   Dedicated thoracic spine MRI can be performed for complete evaluation as   clinically warranted.  --- End of Report ---

## 2022-09-22 NOTE — PROGRESS NOTE ADULT - PROBLEM SELECTOR PLAN 4
Multiple discussions held regarding goals of care/next steps with family and other teams involved.  Rad onc input appreciated. Per rad onc dtr does not wish for patient to be transferred to [facility] for WBRT.   Called patient's daughter Alissa to further discuss GOC. No answer. VM left and awaiting call back.

## 2022-09-23 NOTE — PROGRESS NOTE ADULT - SUBJECTIVE AND OBJECTIVE BOX
Indication for Geriatrics and Palliative Care Services/INTERVAL HPI: symptom management and GOC in setting of advanced malignancy  SUBJECTIVE AND OBJECTIVE: Pt seen and examined at bedside. Pt awake and alert. Reports worsening right sided pain. States medication is not lasting long enough and she is having pain for a couple of hours before being able to get next dose.    OVERNIGHT EVENTS: Required PRN PO oxycodone 10mg x2 overnight.    DNR on chart:  Allergies    Levaquin ([place])    Intolerances    MEDICATIONS  (STANDING):  dexAMETHasone  Injectable 4 milliGRAM(s) IV Push four times a day  dextrose 5% + sodium chloride 0.45%. 1000 milliLiter(s) (70 mL/Hr) IV Continuous <Continuous>  levETIRAcetam  IVPB 500 milliGRAM(s) IV Intermittent every 12 hours  metoprolol succinate ER 25 milliGRAM(s) Oral daily    MEDICATIONS  (PRN):    ITEMS UNCHECKED ARE NOT PRESENT    PRESENT SYMPTOMS: [ ]Unable to self-report - see [ ] CPOT [ ] PAINADS [ ] RDOS  Source if other than patient:  [ ]Family   [ ]Team     Pain: [x ]yes [ ]no  QOL impact - impacts ADLS  Location - epigastric region and right side of abdomen/flank region                   Aggravating factors - movement  Quality - throbbing  Radiation - none  Timing- constant   Severity (0-10 scale): 10  Minimal acceptable level (0-10 scale): 2-3      CPOT:    https://www.[facility].org/getattachment/[email or web address]/Critical-Care-Pain-Observation-Tool-(CPOT)    Dyspnea:                           [ ]Mild [ ]Moderate [ ]Severe  Anxiety:                             [ ]Mild [ ]Moderate [ ]Severe  Fatigue:                             [ ]Mild [ ]Moderate [ ]Severe  Nausea:                             [ ]Mild [ ]Moderate [ ]Severe  Loss of appetite:              [ ]Mild [ ]Moderate [ ]Severe  Constipation:                    [ ]Mild [ ]Moderate [ ]Severe    PCSSQ[Palliative Care Spiritual Screening Question]   Severity (0-10):  Score of 4 or > indicate consideration of Chaplaincy referral.  Chaplaincy Referral: [ ] yes [x ] refused [ ] following [ ] Deferred     Caregiver [place]? : [ ] yes [x ] no [ ] Deferred [ ] Declined             Social work referral [ ] Patient & Family Centered Care Referral [ ]     Anticipatory Grief present?:  [ ] yes [x ] no  [ ] Deferred                  Social work referral [ ] Chaplaincy Referral[ ]      Other Symptoms:  [x ]All other review of systems negative   [ ]Unable to obtain due to poor mentation    Palliative Performance Status Version 2:   30     %      [email or web address]_[email or web address]  PHYSICAL EXAM:  Vital Signs Last 24 Hrs  T(C): 36.3 (22 Sep 2022 11:36), Max: 36.3 (21 Sep 2022 19:44)  T(F): 97.4 (22 Sep 2022 11:36), Max: 97.4 (22 Sep 2022 00:54)  HR: 95 (22 Sep 2022 11:36) (89 - 108)  BP: 128/77 (22 Sep 2022 11:36) (125/84 - 142/78)  BP(mean): --  RR: 20 (22 Sep 2022 04:27) (19 - 20)  SpO2: 94% (22 Sep 2022 11:36) (86% - 94%)    Parameters below as of 22 Sep 2022 11:36  Patient On (Oxygen Delivery Method): mask, Venturi    GENERAL:  [x]Alert  [x]Oriented x 2  [ ]Lethargic  [ ]Cachexia  [ ]Unarousable  [x]Verbal  [ ]Non-Verbal  Behavioral:   [ ]Anxiety  [ ]Delirium [ ]Agitation [ ]Other  HEENT:  [x]Normal   [ ]Dry mouth   [ ]ET Tube/Trach  [ ]Oral lesions  PULMONARY:   [x]Clear [ ]Tachypnea  [ ]Audible excessive secretions   [ ]Rhonchi        [ ]Right [ ]Left [ ]Bilateral  [ ]Crackles        [ ]Right [ ]Left [ ]Bilateral  [ ]Wheezing     [ ]Right [ ]Left [ ]Bilateral  [ ]Diminished BS [ ] Right [ ]Left [ ]Bilateral  CARDIOVASCULAR:    [x]Regular [ ]Irregular [ ]Tachy  [ ]Bobo [ ]Murmur [ ]Other  GASTROINTESTINAL:  [x]Soft  [ ]Distended   [x]+BS  [ ]Non tender [x ]Tender  [ ]PEG [ ]OGT/ NGT   Last BM: 9/21 per patient, no bm documented in flowsheets  GENITOURINARY:  [x]Normal [ ]Incontinent   [ ]Oliguria/Anuria   [ ]Shore  MUSCULOSKELETAL:   [ ]Normal   [x]Weakness  [ ]Bed/Wheelchair bound [ ]Edema  NEUROLOGIC:   [x]No focal deficits  [ ] Cognitive impairment  [ ] Dysphagia [ ]Dysarthria [ ] Paresis [ ]Other   SKIN:   [x]Normal  [ ]Rash   [ ]Pressure ulcer(s) [ ]y [ ]n present on admission    CRITICAL CARE:  [ ] Shock Present  [ ]Septic [ ]Cardiogenic [ ]Neurologic [ ]Hypovolemic  [ ]  Vasopressors [ ]  Inotropes   [ ]Respiratory failure present [ ]Mechanical ventilation [ ]Non-invasive ventilatory support [ ]High flow    [ ]Acute  [ ]Chronic [ ]Hypoxic  [ ]Hypercarbic [ ]Other  [ ]Other organ failure     LABS:                                             12.3   24.74 )-----------( 330      ( 23 Sep 2022 07:25 )             38.9 09-23    132<L>  |  91<L>  |  50<H>  ----------------------------<  165<H>  4.0   |  27  |  0.59    Ca    10.5      23 Sep 2022 07:17  Phos  2.4     09-23  Mg     2.2     09-23    TPro  6.9  /  Alb  2.9<L>  /  TBili  0.9  /  DBili  x   /  AST  31  /  ALT  30  /  AlkPhos  343<H>  09-23               RADIOLOGY & ADDITIONAL STUDIES: < from: MR Head w/wo IV Cont (09.14.22 @ 19:13) >  ACC: [id number] EXAM:  MR BRAIN WAW IC                          PROCEDURE DATE:  09/14/2022          INTERPRETATION:  CLINICAL INDICATION: Brain metastases. Altered mental   status.    TECHNIQUE: Multi-planar multi-sequential MR imaging of the brain was   performed before and after the intravenous administration of contrast.   7.5 ml of Gadavist IV contrast was administered.    COMPARISON: CT head 9/13/2022.    FINDINGS:  Numerous enhancing intracranial lesions throughout the bilateral cerebral   hemispheres and left cerebellum with surrounding edema. The largest   lesions measure 2.8 x 2.2 x 2.8 cm in the right occipital lobe (4-3), 2.7   x 2.0 x 2.4 cm in the right parietal lobe (4-13) and 2.4 x 1.7 x 1.6 cm   and the left inferior cerebellum (3-12). The left inferior cerebellar   lesion partially effaces the caudal aspect of the fourth ventricle and   left foramen of Luschka.    There is partial effacement of the right lateral ventricle secondary to   surrounding vasogenic edema. However there is mild prominence of the left   lateral ventricle with FLAIR hyperintense signal along the frontal and   occipital horns. Mild hydrocephalus with transependymal CSF flow cannot   be ruled out.     No acute infarct or intracranial hemorrhage identified. No extra-axial   fluid collections. The skull base flow voids are present.    The visualized intraorbital contents are normal. The imaged portions of   the paranasal sinuses are clear. The mastoid air cells are clear. The   visualized osseous structures, soft tissues and partially visualized   parotid glands appear normal.    IMPRESSION:    -Extensive intracranial metastatic disease with surrounding vasogenic   edema. Largest lesions are detailed above.    -Left inferior cerebellar lesion partially effaces the caudal aspect of   the fourth ventricle and left foramen Luschka, with possible associated   mild hydrocephalus.    --- End of Report ---    NIKKY LAM MD; Attending Radiologist  This document has been electronically signed. Sep 15 2022 10:08AM    < end of copied text >        Protein Calorie Malnutrition Present: [ ]mild [ ]moderate [ ]severe [ ]underweight [ ]morbid obesity  https://www.andeal.org/vault/2440/web/files/ONC/Table_Clinical%20Characteristics%20to%20Document%20Malnutrition-White%20JV%20et%20al%202012.pdf    Height (cm): 170.2 (09-16-22 @ 16:54)  Weight (kg): 55.3 (09-16-22 @ 16:54)  BMI (kg/m2): 19.1 (09-16-22 @ 16:54)    [ ]PPSV2 < or = 30%  [ ]significant weight loss [ ]poor nutritional intake [ ]anasarca[ ]Artificial Nutrition    Other REFERRALS:  [ ]Hospice  [ ]Child Life  [ ]Social Work  [ ]Case management [ ]Holistic Therapy     Goals of Care Document:

## 2022-09-23 NOTE — PROGRESS NOTE ADULT - SUBJECTIVE AND OBJECTIVE BOX
Patient is a 76y old  Female who presents with a chief complaint of ams/ sob (23 Sep 2022 12:15)    Being followed by ID for        Interval history:  No other acute events      ROS:  No cough,SOB,CP  No N/V/D  No abd pain  No urinary complaints  No HA  No joint or limb pain  No other complaints    PAST MEDICAL & SURGICAL HISTORY:  COPD (chronic obstructive pulmonary disease)  mild      Colon cancer      History of cholecystectomy        Bone spur  Lt foot 5th digit       H/O bilateral breast biopsy        S/P tonsillectomy  childhood        Allergies    Levaquin ([place])    Intolerances      Antimicrobials:      MEDICATIONS  (STANDING):  dexAMETHasone  Injectable 4 milliGRAM(s) IV Push four times a day  dextrose 5% + sodium chloride 0.45%. 1000 milliLiter(s) (70 mL/Hr) IV Continuous <Continuous>  levETIRAcetam  IVPB 500 milliGRAM(s) IV Intermittent every 12 hours  metoprolol succinate ER 25 milliGRAM(s) Oral daily      Vital Signs Last 24 Hrs  T(C): 36.3 (22 @ 12:00), Max: 36.3 (22 @ 19:59)  T(F): 97.4 (22 @ 12:00), Max: 97.4 (22 @ 19:59)  HR: 95 (22 @ 12:00) (88 - 95)  BP: 146/71 (22 @ 12:00) (141/82 - 154/85)  BP(mean): --  RR: 19 (22 @ 12:00) (19 - 19)  SpO2: 94% (22 @ 12:00) (93% - 96%)    Physical Exam:    Constitutional well preserved,comfortable,pleasant    HEENT PERRLA EOMI,No pallor or icterus    No oral exudate or erythema    Neck supple no JVD or LN    Chest Good AE,CTA    CVS RRR S1 S2 WNl No murmur or rub or gallop    Abd soft BS normal No tenderness no masses    Ext No cyanosis clubbing or edema    IV site no erythema tenderness or discharge    Joints no swelling or LOM    CNS AAO X 3 no focal    Lab Data:                          12.3   24.74 )-----------( 330      ( 23 Sep 2022 07:25 )             38.9           132<L>  |  91<L>  |  50<H>  ----------------------------<  165<H>  4.0   |  27  |  0.59    Ca    10.5      23 Sep 2022 07:17  Phos  2.4       Mg     2.2         TPro  6.9  /  Alb  2.9<L>  /  TBili  0.9  /  DBili  x   /  AST  31  /  ALT  30  /  AlkPhos  343<H>        Urinalysis Basic - ( 22 Sep 2022 07:06 )    Color: Yellow / Appearance: Clear / S.025 / pH: x  Gluc: x / Ketone: Negative  / Bili: Negative / Urobili: Negative   Blood: x / Protein: 30 mg/dL / Nitrite: Negative   Leuk Esterase: Negative / RBC: 10 /hpf / WBC 3 /HPF   Sq Epi: x / Non Sq Epi: 1 /hpf / Bacteria: Negative        .Blood Blood-Peripheral  22   No growth to date.  --  --                    WBC Count: 24.74 (22 @ 07:25)  WBC Count: 27.96 (22 @ 06:56)  WBC Count: 25.48 (22 @ 07:05)  WBC Count: 20.97 (22 @ 06:54)  WBC Count: 17.44 (22 @ 07:11)  WBC Count: 18.44 (22 @ 06:58)  WBC Count: 16.04 (22 @ 07:40)             Patient is a 76y old  Female who presents with a chief complaint of ams/ sob (23 Sep 2022 12:15)    Being followed by ID for elevated WBC        Interval history:  pt more alert  on venturi mask  No other acute events        PAST MEDICAL & SURGICAL HISTORY:  COPD (chronic obstructive pulmonary disease)  mild      Colon cancer      History of cholecystectomy        Bone spur  Lt foot 5th digit       H/O bilateral breast biopsy        S/P tonsillectomy  childhood        Allergies    Levaquin ([place])    Intolerances      Antimicrobials:      MEDICATIONS  (STANDING):  dexAMETHasone  Injectable 4 milliGRAM(s) IV Push four times a day  dextrose 5% + sodium chloride 0.45%. 1000 milliLiter(s) (70 mL/Hr) IV Continuous <Continuous>  levETIRAcetam  IVPB 500 milliGRAM(s) IV Intermittent every 12 hours  metoprolol succinate ER 25 milliGRAM(s) Oral daily      Vital Signs Last 24 Hrs  T(C): 36.3 (22 @ 12:00), Max: 36.3 (22 @ 19:59)  T(F): 97.4 (22 @ 12:00), Max: 97.4 (22 @ 19:59)  HR: 95 (22 @ 12:00) (88 - 95)  BP: 146/71 (22 @ 12:00) (141/82 - 154/85)  BP(mean): --  RR: 19 (22 @ 12:00) (19 - 19)  SpO2: 94% (22 @ 12:00) (93% - 96%)    Physical Exam:    Constitutional  awake     HEENT PERRLA EOMI,No pallor or icterus      Neck supple no JVD or LN    Chest Good AE,CTA    CVS RRR S1 S2     Abd soft BS normal No tenderness no masses    Ext No cyanosis clubbing or edema    IV site no erythema tenderness or discharge    Joints no swelling or LOM        Lab Data:                          12.3   24.74 )-----------( 330      ( 23 Sep 2022 07:25 )             38.9           132<L>  |  91<L>  |  50<H>  ----------------------------<  165<H>  4.0   |  27  |  0.59    Ca    10.5      23 Sep 2022 07:17  Phos  2.4       Mg     2.2         TPro  6.9  /  Alb  2.9<L>  /  TBili  0.9  /  DBili  x   /  AST  31  /  ALT  30  /  AlkPhos  343<H>        Urinalysis Basic - ( 22 Sep 2022 07:06 )    Color: Yellow / Appearance: Clear / S.025 / pH: x  Gluc: x / Ketone: Negative  / Bili: Negative / Urobili: Negative   Blood: x / Protein: 30 mg/dL / Nitrite: Negative   Leuk Esterase: Negative / RBC: 10 /hpf / WBC 3 /HPF   Sq Epi: x / Non Sq Epi: 1 /hpf / Bacteria: Negative        .Blood Blood-Peripheral  22   No growth to date.  --  --        < from: CT Head No Cont (22 @ 15:56) >  IMPRESSION:    Numerous hyperattenuating lesions throughout the bilateral cerebri and   left cerebellum, with surroundingvasogenic edema, compatible with known   metastases, as seen on prior MRI brain of 2022. No acute hemorrhage   is identified.    --- End of Report ---    < end of copied text >      < from: Xray Chest 1 View- PORTABLE-Urgent (Xray Chest 1 View- PORTABLE-Urgent .) (22 @ 16:53) >  IMPRESSION:    Unchanged scattered bilateral nodular opacities consistent with   metastatic disease.    Unchanged small bilateral pleural effusions.    < end of copied text >          WBC Count: 24.74 (22 @ 07:25)  WBC Count: 27.96 (22 @ 06:56)  WBC Count: 25.48 (22 @ 07:05)  WBC Count: 20.97 (22 @ 06:54)  WBC Count: 17.44 (22 @ 07:11)  WBC Count: 18.44 (22 @ 06:58)  WBC Count: 16.04 (22 @ 07:40)

## 2022-09-23 NOTE — PROGRESS NOTE ADULT - ASSESSMENT
77 yo F        h/o colon CA with  mets    no longer on oral chemo x  past  1 mo     p/w AMS x4 weeks, worsening over the last few days.  had  c/o right sided rib pain for weeks  and .sob  on exertion x days.     pmd  dr coffman   sent pt in ,  presented to her with AMS, worsening weakness and lethargy.     Per , pt slipped and fell in the bathroom while getting up from the toilet 3 weeks ago.       admitted   with  ams.  from cerebral   mets  with bleed.  able to  converse   h/o  metastatic Ca colon, oncologist dr jose enrique ignacio   N/S  eval, no intervention,   pt has no focal weakness   CT  chest angio. :  ,Pe, b/l pl effusions, hepatic mets /   T4 comp fx, with bony retropulsion,     TLSO  brace  per  N/S     pt  with +   PE/ R  leg  DVT,   unable to  a/c pt , given cerebral bleed/ family  aware of risks   wbc  noted. from malignancy  afebrile , , fall risk/ PT eval   oncology dr bullock  ,  on Decadron  for  h'gic mets, seen by  N/S         aware of  poor prognosis.  family   had  meeting  with palliative care, on 9/ 15,  remains  full code    s/p   IVC filter  by  IR  rx  plan per  dr bullock  is  palliative  in nature,  RT  to  brain / agree  with  oncology  that,  pt  ideal  candidate  for  comfort  care/  hospice  spoke with  daughter ,  pt remains  full  code  pain meds per  palliative  care team/   continue  current care  per  daughter   will  decide   on further   trajectory  after  meeting with  consultants    pe  N/S,  gammaknife  rt  a s an out pt  on dcedron/  morphine    elevated  wbc  from decadron. pt is  afberile    daughter  wants  pt  home.,  pt is  bed bound     daughter   wants  all done/  rpt ct  head  was  stable from 9/ 22    spoke  at length  with spouse  and daughter, clearly  stated  that  pt si  full code. and   that  family does not wish to pursue  more  meetings    this  was  confirmed  by  conversation between   vahe lucio   and  daughter    and  dr bullock  has  spoken at length with daughter / kayode  Rad  onc t will  decide  on radiation   n o decdron, keppra, torpol, oxycodone         rad< from: CT Head No Cont (09.13.22 @ 18:46) >  RESSION:  Multiple hyperdense nodules are may represent hemorrhagic metastasis in a   patient with history of colon carcinoma. Further evaluation with contrast   enhanced brain MRI is recommended.  No acute intracranial hemorrhage.  --- End of Report ---       RAD< from: CT Abdomen and Pelvis w/ IV Cont (09.13.22 @ 18:48) >  IMPRESSION:  Segmental and subsegmental acute pulmonary arterial emboli as detailed   above. Presence of pulmonary embolism was discussed with Dr. Aguirre by Dr. Orta on September 13, 2022 at 8:03 PM.  Extensive metastatic disease as detailed above.  Small multiloculated bilateral pleural effusions.  Compression fracture deformity of the T4 vertebral body with some   retropulsion of the fractured vertebral body into the spinal canal.   Dedicated thoracic spine MRI can be performed for complete evaluation as   clinically warranted.  --- End of Report ---                   77 yo F        h/o colon CA with  mets    no longer on oral chemo x  past  1 mo     p/w AMS x4 weeks, worsening over the last few days.  had  c/o right sided rib pain for weeks  and .sob  on exertion x days.     pmd  dr coffman   sent pt in ,  presented to her with AMS, worsening weakness and lethargy.     Per , pt slipped and fell in the bathroom while getting up from the toilet 3 weeks ago.       admitted   with  ams.  from cerebral   mets  with bleed.  able to  converse   h/o  metastatic Ca colon, oncologist dr jose enrique ignacio   N/S  eval, no intervention,   pt has no focal weakness   CT  chest angio. :  ,Pe, b/l pl effusions, hepatic mets /   T4 comp fx, with bony retropulsion,     TLSO  brace  per  N/S     pt  with +   PE/ R  leg  DVT,   unable to  a/c pt , given cerebral bleed/ family  aware of risks   wbc  noted. from malignancy  afebrile , , fall risk/ PT eval   oncology dr bullock  ,  on Decadron  for  h'gic mets, seen by  N/S         aware of  poor prognosis.  family   had  meeting  with palliative care, on 9/ 15,  remains  full code    s/p   IVC filter  by  IR  rx  plan per  dr bullock  is  palliative  in nature,  RT  to  brain / agree  with  oncology  that,  pt  ideal  candidate  for  comfort  care/  hospice  spoke with  daughter ,  pt remains  full  code  pain meds per  palliative  care team/   continue  current care  per  daughter   will  decide   on further   trajectory  after  meeting with  consultants    pe  N/S,  gammaknife  rt  a s an out pt  on dcedron/  morphine    elevated  wbc  from decadron. pt is  afberile    daughter  wants  pt  home.,  pt is  bed bound     daughter   wants  all done/  rpt ct  head  was  stable from 9/ 22    spoke  at length  with spouse  and daughter, clearly  stated  that  pt si  full code. and   that  family does not wish to pursue  more  meetings    this  was  confirmed  by  conversation between   vahe lucio   and  daughter    and  dr bullock  has  spoken at length with daughter / kayode  Rad  onc t will  decide  on radiation   on  decdron, keppra, torpol, oxycodone    spoke with Alissa today,  asking clarification  from care cortn if transport can be arranged for  RT  from rehab         rad< from: CT Head No Cont (09.13.22 @ 18:46) >  RESSION:  Multiple hyperdense nodules are may represent hemorrhagic metastasis in a   patient with history of colon carcinoma. Further evaluation with contrast   enhanced brain MRI is recommended.  No acute intracranial hemorrhage.  --- End of Report ---       RAD< from: CT Abdomen and Pelvis w/ IV Cont (09.13.22 @ 18:48) >  IMPRESSION:  Segmental and subsegmental acute pulmonary arterial emboli as detailed   above. Presence of pulmonary embolism was discussed with Dr. Aguirre by Dr. Orta on September 13, 2022 at 8:03 PM.  Extensive metastatic disease as detailed above.  Small multiloculated bilateral pleural effusions.  Compression fracture deformity of the T4 vertebral body with some   retropulsion of the fractured vertebral body into the spinal canal.   Dedicated thoracic spine MRI can be performed for complete evaluation as   clinically warranted.  --- End of Report ---                   77 yo F        h/o colon CA with  mets    no longer on oral chemo x  past  1 mo     p/w AMS x4 weeks, worsening over the last few days.  had  c/o right sided rib pain for weeks  and .sob  on exertion x days.     pmd  dr coffman   sent pt in ,  presented to her with AMS, worsening weakness and lethargy.     Per , pt slipped and fell in the bathroom while getting up from the toilet 3 weeks ago.       admitted   with  ams.  from cerebral   mets  with bleed.  able to  converse   h/o  metastatic Ca colon, oncologist dr jose enrique ignacio   N/S  eval, no intervention,   pt has no focal weakness   CT  chest angio. :  ,Pe, b/l pl effusions, hepatic mets /   T4 comp fx, with bony retropulsion,     TLSO  brace  per  N/S     pt  with +   PE/ R  leg  DVT,   unable to  a/c pt , given cerebral bleed/ family  aware of risks   wbc  noted. from malignancy  afebrile , , fall risk/ PT eval   oncology dr bullock  ,  on Decadron  for  h'gic mets, seen by  N/S         aware of  poor prognosis.  family   had  meeting  with palliative care, on 9/ 15,    and,  remains  full code    s/p   IVC filter  by  IR  rx  plan per  dr bullock  is  palliative  in nature,  RT  to  brain / agree  with  oncology  that,  pt  ideal  candidate  for  comfort  care/  hospice  spoke with  daughter ,  pt remains  full  code  pain meds per  palliative  care team/   continue  current car   pe  N/S,  gammaknife  rt  a s an out pt     elevated  wbc  from decadron. pt is  afberile      daughter   wants  all done/  rpt ct  head  was  stable from 9/ 22    spoke  at length  with spouse  and daughter, clearly  stated  that  pt si  full code. and   that  family does not wish to pursue  any  more  meetings  and,   this  was  confirmed  by  conversation between   vahe lucio   and  daughter  also    and  dr bullock  has  spoken at length with daughter / daughter and  Rad  onc will  decide  on RT   daughter  has stated, that  per  Rad onc , Gamma knife   RT can only be  done as  an out pt  and hence , no need  for  transfer  and  also, she does  not want WBRT   pt  has improved, upon lowering pain meds. more  alert and  daughter in agreement  on  decdron, keppra, torpol, oxycodone  daughter  wants rehab/  may start   d/c planning for next week    spoke with Alissa today,  asking clarification  from care cortn if transport can be arranged for  RT  from rehab         rad< from: CT Head No Cont (09.13.22 @ 18:46) >  RESSION:  Multiple hyperdense nodules are may represent hemorrhagic metastasis in a   patient with history of colon carcinoma. Further evaluation with contrast   enhanced brain MRI is recommended.  No acute intracranial hemorrhage.  --- End of Report ---       RAD< from: CT Abdomen and Pelvis w/ IV Cont (09.13.22 @ 18:48) >  IMPRESSION:  Segmental and subsegmental acute pulmonary arterial emboli as detailed   above. Presence of pulmonary embolism was discussed with Dr. Aguirre by Dr. Orta on September 13, 2022 at 8:03 PM.  Extensive metastatic disease as detailed above.  Small multiloculated bilateral pleural effusions.  Compression fracture deformity of the T4 vertebral body with some   retropulsion of the fractured vertebral body into the spinal canal.   Dedicated thoracic spine MRI can be performed for complete evaluation as   clinically warranted.  --- End of Report ---

## 2022-09-23 NOTE — PROGRESS NOTE ADULT - PROBLEM SELECTOR PLAN 1
Pt reports pain is not well controlled.  MS contin and PRN oxycodone d/c yesterday by primary team due to concern for altered mental status.  Currently ordered for Oxycodone 10mg q6h PRN moderate pain   Pt awake and alert on examination.    Would recommend the following regimen due to concern for acute pain crisis and potential withdrawal symptoms with acute decrease in pain regimen.     Oxycodone 15mg q4h PRN moderate pain hold for sedation or RR<10  Oxycodone 20mg q4h PRN severe pain hold for sedation or RR<10  bowel regimen

## 2022-09-23 NOTE — PROGRESS NOTE ADULT - SUBJECTIVE AND OBJECTIVE BOX
CARDIOLOGY     PROGRESS  NOTE   ________________________________________________    CHIEF COMPLAINT:Patient is a 76y old  Female who presents with a chief complaint of ams/ sob (22 Sep 2022 14:33)  sleeping.  	  REVIEW OF SYSTEMS:  CONSTITUTIONAL: No fever, weight loss, or fatigue  EYES: No eye pain, visual disturbances, or discharge  ENT:  No difficulty hearing, tinnitus, vertigo; No sinus or throat pain  NECK: No pain or stiffness  RESPIRATORY: No cough, wheezing, chills or hemoptysis; No Shortness of Breath  CARDIOVASCULAR: No chest pain, palpitations, passing out, dizziness, or leg swelling  GASTROINTESTINAL: No abdominal or epigastric pain. No nausea, vomiting, or hematemesis; No diarrhea or constipation. No melena or hematochezia.  GENITOURINARY: No dysuria, frequency, hematuria, or incontinence  NEUROLOGICAL: No headaches, memory loss, loss of strength, numbness, or tremors  SKIN: No itching, burning, rashes, or lesions   LYMPH Nodes: No enlarged glands  ENDOCRINE: No heat or cold intolerance; No hair loss  MUSCULOSKELETAL: No joint pain or swelling; No muscle, back, or extremity pain  PSYCHIATRIC: No depression, anxiety, mood swings, or difficulty sleeping  HEME/LYMPH: No easy bruising, or bleeding gums  ALLERGY AND IMMUNOLOGIC: No hives or eczema	    [ ] All others negative	  [x ] Unable to obtain    PHYSICAL EXAM:  T(C): 36.3 (09-23-22 @ 04:26), Max: 36.3 (09-22-22 @ 11:36)  HR: 89 (09-23-22 @ 04:26) (88 - 95)  BP: 154/85 (09-23-22 @ 04:26) (128/77 - 154/85)  RR: 19 (09-23-22 @ 04:26) (19 - 19)  SpO2: 93% (09-23-22 @ 04:26) (93% - 96%)  Wt(kg): --  I&O's Summary    22 Sep 2022 07:01  -  23 Sep 2022 07:00  --------------------------------------------------------  IN: 0 mL / OUT: 150 mL / NET: -150 mL        Appearance: lethargic	  HEENT:   Normal oral mucosa, PERRL, EOMI	  Lymphatic: No lymphadenopathy  Cardiovascular: Normal S1 S2, No JVD, + murmurs, No edema  Respiratory: Lungs clear to auscultation	  Psychiatry: lethargic  Gastrointestinal:  Soft, Non-tender, + BS	  Skin: No rashes, No ecchymoses, No cyanosis	  Neurologic: Non-focal  Extremities: Normal range of motion, No clubbing, cyanosis or edema  Vascular: Peripheral pulses palpable 2+ bilaterally    MEDICATIONS  (STANDING):  dexAMETHasone  Injectable 4 milliGRAM(s) IV Push four times a day  dextrose 5% + sodium chloride 0.45%. 1000 milliLiter(s) (70 mL/Hr) IV Continuous <Continuous>  levETIRAcetam  IVPB 500 milliGRAM(s) IV Intermittent every 12 hours  metoprolol succinate ER 25 milliGRAM(s) Oral daily      TELEMETRY: 	    ECG:  	  RADIOLOGY:  OTHER: 	  	  LABS:	 	    CARDIAC MARKERS:                                12.3   24.74 )-----------( 330      ( 23 Sep 2022 07:25 )             38.9     09-22    132<L>  |  88<L>  |  57<H>  ----------------------------<  142<H>  4.2   |  26  |  0.99    Ca    10.8<H>      22 Sep 2022 06:52    TPro  7.3  /  Alb  3.4  /  TBili  0.7  /  DBili  x   /  AST  37  /  ALT  37  /  AlkPhos  342<H>  09-22    proBNP: Serum Pro-Brain Natriuretic Peptide: 2287 pg/mL (09-13 @ 18:51)    Lipid Profile:   HgA1c:   TSH:       < from: CT Head No Cont (09.22.22 @ 15:56) >  Numerous hyperattenuating lesions throughout the bilateral cerebri and   left cerebellum, with surroundingvasogenic edema, compatible with known   metastases, as seen on prior MRI brain of 9/14/2022. No acute hemorrhage   is identified.      Assessment and plan  ---------------------------  75 yo F h/o colon CA with  mets was on   chemo ,  no longer on oral chemo x  past  1 mo p/w AMS x4 weeks, worsening over the last few days.     had also been c/o right sided rib pain for weeks as well.sob  on exertion x days.     pmd  d r brand   sent pt in for further evaluation as pt presented to her with AMS, worsening weakness and lethargy.     Per , pt slipped and fell in the bathroom while getting up from the toilet 3 weeks ago.    Unsure if she had any LOC. Has had unsteady gait since.      +intermittent episodes of diarrhea     Denies nausea, vomiting, fever, chills, cough, chest pain, blood in stool, urinary complaints, headache.   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  AC contraindicated  pt needs IVC filter any way despite doppler  echo in er noted with normal ef and bl pleural effusion with increase pro bnp  dc ivf  increase beta blocker for bp control, controlled now  PE on no AC sec to brain hemorrhagic mets  palliative care noted

## 2022-09-23 NOTE — PROGRESS NOTE ADULT - SUBJECTIVE AND OBJECTIVE BOX
afebriel    REVIEW OF SYSTEMS:  GEN: no fever,    no chills  RESP: no SOB,   no cough  CVS: no chest pain,   no palpitations  GI: no abdominal pain,   no nausea,   no vomiting,   no constipation,   no diarrhea  : no dysuria,   no frequency  NEURO: no headache,   no dizziness  PSYCH: no depression,   not anxious  Derm : no rash    MEDICATIONS  (STANDING):  dexAMETHasone  Injectable 4 milliGRAM(s) IV Push four times a day  dextrose 5% + sodium chloride 0.45%. 1000 milliLiter(s) (70 mL/Hr) IV Continuous <Continuous>  levETIRAcetam  IVPB 500 milliGRAM(s) IV Intermittent every 12 hours  metoprolol succinate ER 25 milliGRAM(s) Oral daily    MEDICATIONS  (PRN):  oxyCODONE    IR 10 milliGRAM(s) Oral every 6 hours PRN Moderate Pain (4 - 6)      Vital Signs Last 24 Hrs  T(C): 36.3 (23 Sep 2022 04:26), Max: 36.3 (22 Sep 2022 11:36)  T(F): 97.4 (23 Sep 2022 04:26), Max: 97.4 (22 Sep 2022 11:36)  HR: 89 (23 Sep 2022 04:26) (88 - 95)  BP: 154/85 (23 Sep 2022 04:26) (128/77 - 154/85)  BP(mean): --  RR: 19 (23 Sep 2022 04:26) (19 - 19)  SpO2: 93% (23 Sep 2022 04:26) (93% - 96%)    Parameters below as of 23 Sep 2022 04:26  Patient On (Oxygen Delivery Method): mask, Venturi      CAPILLARY BLOOD GLUCOSE        I&O's Summary    22 Sep 2022 07:01  -  23 Sep 2022 07:00  --------------------------------------------------------  IN: 0 mL / OUT: 150 mL / NET: -150 mL        PHYSICAL EXAM:  HEAD:  Atraumatic, Normocephalic  NECK: Supple, No   JVD  CHEST/LUNG:   no     rales,     no,    rhonchi  HEART: Regular rate and rhythm;         murmur  ABDOMEN: Soft, Nontender, ;   EXTREMITIES:  no      edema  NEUROLOGY:  alert    LABS:                        12.3   24.74 )-----------( 330      ( 23 Sep 2022 07:25 )             38.9         132<L>  |  91<L>  |  50<H>  ----------------------------<  165<H>  4.0   |  27  |  0.59    Ca    10.5      23 Sep 2022 07:17  Phos  2.4       Mg     2.2         TPro  6.9  /  Alb  2.9<L>  /  TBili  0.9  /  DBili  x   /  AST  31  /  ALT  30  /  AlkPhos  343<H>            Urinalysis Basic - ( 22 Sep 2022 07:06 )    Color: Yellow / Appearance: Clear / S.025 / pH: x  Gluc: x / Ketone: Negative  / Bili: Negative / Urobili: Negative   Blood: x / Protein: 30 mg/dL / Nitrite: Negative   Leuk Esterase: Negative / RBC: 10 /hpf / WBC 3 /HPF   Sq Epi: x / Non Sq Epi: 1 /hpf / Bacteria: Negative                      Consultant(s) Notes Reviewed:      Care Discussed with Consultants/Other Providers:

## 2022-09-23 NOTE — PROGRESS NOTE ADULT - PROBLEM SELECTOR PLAN 5
Case discussed with primary team ACP and patient's daugther.     For acute issues or uncontrolled symptoms please page palliative team.    Lyn Villagomez MD  Geriatrics and Palliative Medicine Attending  [facility] pager: [phone]

## 2022-09-23 NOTE — PROGRESS NOTE ADULT - ASSESSMENT
77 yo F        h/o colon CA with  mets      was on   chemo ,  no longer on oral chemo x  past  1 mo     p/w AMS x4 weeks, worsening over the last few days.     had also been c/o right sided rib pain for weeks as well.sob  on exertion x days.     pmd  d r brand   sent pt in for further evaluation as pt presented to her with AMS, worsening weakness and lethargy.     Per , pt slipped and fell in the bathroom while getting up from the toilet 3 weeks ago.    Unsure if she had any LOC. Has had unsteady gait since.      +intermittent episodes of diarrhea     Denies nausea, vomiting, fever, chills, cough, chest pain, blood in stool, urinary complaints, headache. (13 Sep 2022 20:18)    CTH: mult hyperdense lesions c/f mets (?heme). CT CAP showing acute PE, pleural effusions, ext mets disease, T4 VB comp fx w/ retropulsion  MRI brain with multiple brain mets:  neurosurgery recommend Rad onc for radiosurgery. C  -TLSO brace when OOB for comfort  -Pt was started on Dex 4q6 and Keppra 500bid. Blood cultures were negative   - Pt with DVT and PE IVC filter placed 9/16 9/19 seen by radiation oncology : The plan as discussed with Dr. Tran/ Dr. Nathan is outpatient gammaknife radiosurgery after discharge.  This is done at [address]  family has refused palliative care/comfort care. Pt is not DNR  ID is now called for elevated WBC      A/P    # Elevated WBC  - most likely etiology is from steroids but cannot r/o infectious cause without some sort of investigation. Pt is not coughing   . The is no phlebitis and patient denies urinary sxs or diarrhea   BC x 2 sets- negative to date  - check u/a- no pyuria  CXR ( pt could be aspirating)- no pneumonia  and check stool for c diff , GI PCR if develops diarrhea        No  infectious etiology identified so far for wbc  WBC could also be in response to extensive mets, to DVTs, part of leukemoid reaction to the cancer, and as I mentioned from the steroids, in addition to any possible infectious cause or other   If the family wants a full intervention then suggest  BC x 2 sets, CXR, U/a and swallow study and stool c diff and culture (if diarrhea), and repeat CMP ( LFTs)      ID will follow the patient PRN. Please recontact ID if we can be of further assistance . [phone]    Johanna Celaya M.D. ,   please reach via teams   If no answer, or after 5PM/ weekends,  then please call  [phone]    Assessment and plan discussed with the primary team .

## 2022-09-24 NOTE — PROGRESS NOTE ADULT - PROBLEM SELECTOR PLAN 5
In the event of newly developing, evolving, or worsening symptoms, please contact the Palliative Medicine team via pager (if the patient is at [facility] #[phone] or if the patient is at [facility] #[phone]) The Geriatric and Palliative Medicine service has coverage 24 hours a day/ 7 days a week to provide medical recommendations regarding symptom management needs via telephone

## 2022-09-24 NOTE — SWALLOW BEDSIDE ASSESSMENT ADULT - SLP PERTINENT HISTORY OF CURRENT PROBLEM
77 y/o F with a PMH of colon CA with mets recently completed chemo tx, no longer on oral chemo x 1 month, p/w AMS x4 weeks, worsening over the last few days. Has also been c/o right sided rib pain for weeks as well. CT head concerning for hemorrhagic metastasis. CTH: mult hyperdense lesions c/f mets (?heme). CT CAP showing acute PE, pleural effusions, ext mets disease, T4 VB comp fx w/ retropulsion. Heme/Onc following; pt on decadron, prognosis is extremely poor. Ngsy indicates no acute intervention, rx rad onc for radiosurgery after d/c; TLSO brace when OOB for comfort.

## 2022-09-24 NOTE — PROGRESS NOTE ADULT - SUBJECTIVE AND OBJECTIVE BOX
CARDIOLOGY     PROGRESS  NOTE   ________________________________________________    CHIEF COMPLAINT:Patient is a 76y old  Female who presents with a chief complaint of ams/ sob (24 Sep 2022 07:27)  no complain.  	  REVIEW OF SYSTEMS:  CONSTITUTIONAL: No fever, weight loss, or fatigue  EYES: No eye pain, visual disturbances, or discharge  ENT:  No difficulty hearing, tinnitus, vertigo; No sinus or throat pain  NECK: No pain or stiffness  RESPIRATORY: No cough, wheezing, chills or hemoptysis; No Shortness of Breath  CARDIOVASCULAR: No chest pain, palpitations, passing out, dizziness, or leg swelling  GASTROINTESTINAL: No abdominal or epigastric pain. No nausea, vomiting, or hematemesis; No diarrhea or constipation. No melena or hematochezia.  GENITOURINARY: No dysuria, frequency, hematuria, or incontinence  NEUROLOGICAL: No headaches, memory loss, loss of strength, numbness, or tremors  SKIN: No itching, burning, rashes, or lesions   LYMPH Nodes: No enlarged glands  ENDOCRINE: No heat or cold intolerance; No hair loss  MUSCULOSKELETAL: No joint pain or swelling; No muscle, back, or extremity pain  PSYCHIATRIC: No depression, anxiety, mood swings, or difficulty sleeping  HEME/LYMPH: No easy bruising, or bleeding gums  ALLERGY AND IMMUNOLOGIC: No hives or eczema	    [ ] All others negative	  [x ] Unable to obtain    PHYSICAL EXAM:  T(C): 36.3 (09-24-22 @ 05:19), Max: 36.4 (09-23-22 @ 21:33)  HR: 85 (09-24-22 @ 05:19) (85 - 93)  BP: 136/78 (09-24-22 @ 05:19) (136/78 - 137/81)  RR: 18 (09-24-22 @ 05:19) (18 - 18)  SpO2: 96% (09-24-22 @ 05:19) (96% - 97%)  Wt(kg): --  I&O's Summary    23 Sep 2022 07:01  -  24 Sep 2022 07:00  --------------------------------------------------------  IN: 240 mL / OUT: 0 mL / NET: 240 mL        Appearance: Normal	  HEENT:   Normal oral mucosa, PERRL, EOMI	  Lymphatic: No lymphadenopathy  Cardiovascular: Normal S1 S2, No JVD, + murmurs, No edema  Respiratory: Lungs clear to auscultation	  Gastrointestinal:  Soft, Non-tender, + BS	  Skin: No rashes, No ecchymoses, No cyanosis	  Neurologic: Non-focal  Extremities: Normal range of motion, No clubbing, cyanosis or edema  Vascular: Peripheral pulses palpable 2+ bilaterally    MEDICATIONS  (STANDING):  bisacodyl 5 milliGRAM(s) Oral at bedtime  dexAMETHasone  Injectable 4 milliGRAM(s) IV Push four times a day  dextrose 5% + sodium chloride 0.45%. 1000 milliLiter(s) (70 mL/Hr) IV Continuous <Continuous>  levETIRAcetam  IVPB 500 milliGRAM(s) IV Intermittent every 12 hours  metoprolol succinate ER 25 milliGRAM(s) Oral daily  senna 2 Tablet(s) Oral at bedtime      TELEMETRY: 	    ECG:  	  RADIOLOGY:  OTHER: 	  	  LABS:	 	    CARDIAC MARKERS:                                12.3   24.74 )-----------( 330      ( 23 Sep 2022 07:25 )             38.9     09-23    132<L>  |  91<L>  |  50<H>  ----------------------------<  165<H>  4.0   |  27  |  0.59    Ca    10.5      23 Sep 2022 07:17  Phos  2.4     09-23  Mg     2.2     09-23    TPro  6.9  /  Alb  2.9<L>  /  TBili  0.9  /  DBili  x   /  AST  31  /  ALT  30  /  AlkPhos  343<H>  09-23    proBNP: Serum Pro-Brain Natriuretic Peptide: 2287 pg/mL (09-13 @ 18:51)    Lipid Profile:   HgA1c:   TSH:     # Elevated WBC  - most likely etiology is from steroids but cannot r/o infectious cause without some sort of investigation. Pt is not coughing   . The is no phlebitis and patient denies urinary sxs or diarrhea   BC x 2 sets- negative to date  - check u/a- no pyuria  CXR ( pt could be aspirating)- no pneumonia  and check stool for c diff , GI PCR if develops diarrhea        No  infectious etiology identified so far for wbc  WBC could also be in response to extensive mets, to DVTs, part of leukemoid reaction to the cancer, and as I mentioned from the steroids, in addition to any possible infectious cause or other   If the family wants a full intervention then suggest  BC x 2 sets, CXR, U/a and swallow study and stool c diff and culture (if diarrhea), and repeat CMP ( LFTs)    Assessment and plan  ---------------------------  77 yo F h/o colon CA with  mets was on   chemo ,  no longer on oral chemo x  past  1 mo p/w AMS x4 weeks, worsening over the last few days.     had also been c/o right sided rib pain for weeks as well.sob  on exertion x days.     pmd  d r brand   sent pt in for further evaluation as pt presented to her with AMS, worsening weakness and lethargy.     Per , pt slipped and fell in the bathroom while getting up from the toilet 3 weeks ago.    Unsure if she had any LOC. Has had unsteady gait since.      +intermittent episodes of diarrhea     Denies nausea, vomiting, fever, chills, cough, chest pain, blood in stool, urinary complaints, headache.   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  AC contraindicated  pt needs IVC filter any way despite doppler  echo in er noted with normal ef and bl pleural effusion with increase pro bnp  dc ivf  increase beta blocker for bp control, controlled now  PE on no AC sec to brain hemorrhagic mets  palliative care noted  ID noted with increase wbc

## 2022-09-24 NOTE — SWALLOW BEDSIDE ASSESSMENT ADULT - SLP GENERAL OBSERVATIONS
Pt received upright at bedside; AOx4, +spouse present; 5/10 back-pain (RN aware; had received opioids earlier this AM). Pt reports no hx of dysphagia.

## 2022-09-24 NOTE — PROGRESS NOTE ADULT - TIME BILLING
Total time spent including the following  [x]chart review and documentation  []review of medical literature related to pathogenesis, disease/illness progress, treatment and/or prognosis  []care coordination:  []discussion with the primary team:  []discussion with floor staff:  []discussion with consultant(s):  [x]discussion with the patient, surrogate decision maker, or family:  Time spent: > 35  min

## 2022-09-24 NOTE — PROGRESS NOTE ADULT - ASSESSMENT
75 yo F        h/o colon CA with  mets    no longer on oral chemo x  past  1 mo     p/w AMS x4 weeks, worsening over the last few days.  had  c/o right sided rib pain for weeks  and .sob  on exertion x days.     pmd  dr coffman   sent pt in ,  presented to her with AMS, worsening weakness and lethargy.     Per , pt slipped and fell in the bathroom while getting up from the toilet 3 weeks ago.       admitted   with  ams.  from cerebral   mets  with bleed.  able to  converse   h/o  metastatic Ca colon, oncologist dr jose enrique ignacio   N/S  eval, no intervention,   pt has no focal weakness   CT  chest angio. :  ,Pe, b/l pl effusions, hepatic mets /   T4 comp fx, with bony retropulsion,     TLSO  brace  per  N/S     pt  with +   PE/ R  leg  DVT,   unable to  a/c pt , given cerebral bleed/ family  aware of risks   wbc  noted. from malignancy  afebrile , , fall risk/ PT eval   oncology dr bullock  ,  on Decadron  for  h'gic mets, seen by  N/S         aware of  poor prognosis.  family   had  meeting  with palliative care, on 9/ 15,    and,  remains  full code    s/p   IVC filter  by  IR  rx  plan per  dr bullock  is  palliative  in nature,  RT  to  brain / agree  with  oncology  that,  pt  ideal  candidate  for  comfort  care/  hospice  spoke with  daughter ,  pt remains  full  code  pain meds per  palliative  care team/   continue  current car   pe  N/S,  gammaknife  rt  a s an out pt     elevated  wbc  from decadron. pt is  afberile      daughter   wants  all done/  rpt ct  head  was  stable from 9/ 22    spoke  at length  with spouse  and daughter, clearly  stated  that  pt si  full code. and   that  family does not wish to pursue  any  more  meetings  and,   this  was  confirmed  by  conversation between   vahe lucio   and  daughter  also    and  dr bullock  has  spoken at length with daughter / daughter and  Rad  onc will  decide  on RT   daughter  has stated, that  per  Rad onc , Gamma knife   RT can only be  done as  an out pt  and hence , no need  for  transfer  and  also, she does  not want WBRT   pt  has improved, upon lowering pain meds. more  alert and  daughter in agreement  on  decdron, keppra, torpol, oxycodone  daughter  wants rehab/  may start   d/c planning for next week  on prn pain meds.  labs  pending      daughter  Alissa today,  asking clarification  from care cortn if transport can be arranged for  RT  from rehab         rad< from: CT Head No Cont (09.13.22 @ 18:46) >  RESSION:  Multiple hyperdense nodules are may represent hemorrhagic metastasis in a   patient with history of colon carcinoma. Further evaluation with contrast   enhanced brain MRI is recommended.  No acute intracranial hemorrhage.  --- End of Report ---       RAD< from: CT Abdomen and Pelvis w/ IV Cont (09.13.22 @ 18:48) >  IMPRESSION:  Segmental and subsegmental acute pulmonary arterial emboli as detailed   above. Presence of pulmonary embolism was discussed with Dr. Aguirre by Dr. Orta on September 13, 2022 at 8:03 PM.  Extensive metastatic disease as detailed above.  Small multiloculated bilateral pleural effusions.  Compression fracture deformity of the T4 vertebral body with some   retropulsion of the fractured vertebral body into the spinal canal.   Dedicated thoracic spine MRI can be performed for complete evaluation as   clinically warranted.  --- End of Report ---

## 2022-09-24 NOTE — SWALLOW BEDSIDE ASSESSMENT ADULT - SWALLOW EVAL: DIAGNOSIS
75 y/o F with a PMH of colon CA with mets recently completed chemo tx, no longer on oral chemo x 1 month, p/w AMS x4 weeks, worsening over the last few days, found to have extensive mets to brain w/ hemorrhagic metastasis. Pt p/w overtly functional oropharyngeal swallow function w/ a regular texture diet. Oral phase characterized by functional and efficient oral management, subjectively timely initiaton of swallow, w/ no overt s/sx of aspiraiton w/ all administered consistencies/textures. Diet modification not indicated at thsi time, however, in consideration of pt fluctuctuating energy levels given medical status/prognosis, pt/family may choose softer regular foods per preference.

## 2022-09-24 NOTE — PROGRESS NOTE ADULT - PROBLEM SELECTOR PLAN 1
Currently ordered for Oxycodone 10mg q6h PRN moderate pain   Today she reports adequate analgesia  Monitor to determine if transient  Can consider Oxy 15 mg ER q12H  Can consider Oxy 7.5 mg q3 H PRN  May need to uptitrate in 24 to 48 hours

## 2022-09-24 NOTE — PROGRESS NOTE ADULT - SUBJECTIVE AND OBJECTIVE BOX
Full note is forthcoming        In the event of newly developing, evolving, or worsening symptoms, please contact the Palliative Medicine team via pager (if the patient is at [facility] #[phone] or if the patient is at [facility] #[phone]) The Geriatric and Palliative Medicine service has coverage 24 hours a day/ 7 days a week to provide medical recommendations regarding symptom management needs via telephone        Lonnie Sotelo MD   of Geriatric and Palliative Medicine  [facility]    Between the hours of 9 am and 5 pm from Monday through Friday, please contact me on [employer] Teams    After 5pm and on weekends, please see the contact information below:    In the event of newly developing, evolving, or worsening symptoms, please contact the Palliative Medicine team via pager (if the patient is at [facility] #[phone] or if the patient is at [facility] #[phone]) The Geriatric and Palliative Medicine service has coverage 24 hours a day/ 7 days a week to provide medical recommendations regarding symptom management needs via telephone   Indication for Geriatrics and Palliative Care Services/INTERVAL HPI: symptom management and GOC in setting of advanced malignancy        09-24-22 @ 08:29  [facility] [id number]    Overnight events: No major events  Staff reports: N/a  Patient: Pt reports having pain be well controlled today. I discussed the prospect of ER release medication and the intimated that she would like to confer with the team since they were worried. Overall, her pain is controlled today  PRN use: four 10 mg PRNs  of oxy        RECENT VITALS/LABS/MEDICATIONS/ASSAYS     09-24-22 @ 08:29    T(C): 36.3 (09-24-22 @ 05:19), Max: 36.4 (09-23-22 @ 21:33)  HR: 85 (09-24-22 @ 05:19) (85 - 95)  BP: 136/78 (09-24-22 @ 05:19) (136/78 - 146/71)  RR: 18 (09-24-22 @ 05:19) (18 - 19)  SpO2: 96% (09-24-22 @ 05:19) (94% - 97%)  Wt(kg): --      23 Sep 2022 07:01  -  24 Sep 2022 07:00  --------------------------------------------------------  IN:    Oral Fluid: 240 mL  Total IN: 240 mL    OUT:  Total OUT: 0 mL    Total NET: 240 mL          09-23 @ 07:01  -  09-24 @ 07:00  --------------------------------------------------------  IN: 240 mL / OUT: 0 mL / NET: 240 mL      CAPILLARY BLOOD GLUCOSE            bisacodyl 5 milliGRAM(s) Oral at bedtime  dexAMETHasone  Injectable 4 milliGRAM(s) IV Push four times a day  dextrose 5% + sodium chloride 0.45%. 1000 milliLiter(s) IV Continuous <Continuous>  levETIRAcetam  IVPB 500 milliGRAM(s) IV Intermittent every 12 hours  metoprolol succinate ER 25 milliGRAM(s) Oral daily  oxyCODONE    IR 10 milliGRAM(s) Oral every 4 hours PRN  oxyCODONE    IR 15 milliGRAM(s) Oral every 4 hours PRN  potassium phosphate IVPB 15 milliMole(s) IV Intermittent once  senna 2 Tablet(s) Oral at bedtime          09-23    132<L>  |  91<L>  |  50<H>  ----------------------------<  165<H>  4.0   |  27  |  0.59    Ca    10.5      23 Sep 2022 07:17  Phos  2.4     09-23  Mg     2.2     09-23    TPro  6.9  /  Alb  2.9<L>  /  TBili  0.9  /  DBili  x   /  AST  31  /  ALT  30  /  AlkPhos  343<H>  09-23      Procalc  BNP  ABG                          12.3   24.74 )-----------( 330      ( 23 Sep 2022 07:25 )             38.9           blood and urine cultures              DNR on chart:  Allergies    Levaquin (Flushing)    Intolerances    MEDICATIONS  (STANDING):  dexAMETHasone  Injectable 4 milliGRAM(s) IV Push four times a day  dextrose 5% + sodium chloride 0.45%. 1000 milliLiter(s) (70 mL/Hr) IV Continuous <Continuous>  levETIRAcetam  IVPB 500 milliGRAM(s) IV Intermittent every 12 hours  metoprolol succinate ER 25 milliGRAM(s) Oral daily    MEDICATIONS  (PRN):    ITEMS UNCHECKED ARE NOT PRESENT    PRESENT SYMPTOMS: [ ]Unable to self-report - see [ ] CPOT [ ] PAINADS [ ] RDOS  Source if other than patient:  [ ]Family   [ ]Team     Pain: [x ]yes [ ]no  QOL impact - impacts ADLS  Location - epigastric region and right side of abdomen/flank region                   Aggravating factors - movement  Quality - throbbing  Radiation - none  Timing- constant   Severity (0-10 scale): 10  Minimal acceptable level (0-10 scale): 2-3      CPOT:    https://www.[email or web address]/getattachment/[email or web address]/Critical-Care-Pain-Observation-Tool-(CPOT)    Dyspnea:                           [ ]Mild [ ]Moderate [ ]Severe  Anxiety:                             [ ]Mild [ ]Moderate [ ]Severe  Fatigue:                             [ ]Mild [ ]Moderate [ ]Severe  Nausea:                             [ ]Mild [ ]Moderate [ ]Severe  Loss of appetite:              [ ]Mild [ ]Moderate [ ]Severe  Constipation:                    [ ]Mild [ ]Moderate [ ]Severe    PCSSQ[Palliative Care Spiritual Screening Question]   Severity (0-10):  Score of 4 or > indicate consideration of Chaplaincy referral.  Chaplaincy Referral: [ ] yes [x ] refused [ ] following [ ] Deferred     Caregiver [place]? : [ ] yes [x ] no [ ] Deferred [ ] Declined             Social work referral [ ] Patient & Family Centered Care Referral [ ]     Anticipatory Grief present?:  [ ] yes [x ] no  [ ] Deferred                  Social work referral [ ] Chaplaincy Referral[ ]      Other Symptoms:  [x ]All other review of systems negative   [ ]Unable to obtain due to poor mentation    Palliative Performance Status Version 2:   30     %      http://npcrc.org/files/news/palliative_performance_scale_ppsv2.pdf  PHYSICAL EXAM:  Vital Signs Last 24 Hrs  T(C): 36.3 (22 Sep 2022 11:36), Max: 36.3 (21 Sep 2022 19:44)  T(F): 97.4 (22 Sep 2022 11:36), Max: 97.4 (22 Sep 2022 00:54)  HR: 95 (22 Sep 2022 11:36) (89 - 108)  BP: 128/77 (22 Sep 2022 11:36) (125/84 - 142/78)  BP(mean): --  RR: 20 (22 Sep 2022 04:27) (19 - 20)  SpO2: 94% (22 Sep 2022 11:36) (86% - 94%)    Parameters below as of 22 Sep 2022 11:36  Patient On (Oxygen Delivery Method): mask, Venturi    GENERAL:  [x]Alert  [x]Oriented x 2  [ ]Lethargic  [ ]Cachexia  [ ]Unarousable  [x]Verbal  [ ]Non-Verbal  Behavioral:   [ ]Anxiety  [ ]Delirium [ ]Agitation [ ]Other  HEENT:  [x]Normal   [ ]Dry mouth   [ ]ET Tube/Trach  [ ]Oral lesions  PULMONARY:   [x]Clear [ ]Tachypnea  [ ]Audible excessive secretions   [ ]Rhonchi        [ ]Right [ ]Left [ ]Bilateral  [ ]Crackles        [ ]Right [ ]Left [ ]Bilateral  [ ]Wheezing     [ ]Right [ ]Left [ ]Bilateral  [ ]Diminished BS [ ] Right [ ]Left [ ]Bilateral  CARDIOVASCULAR:    [x]Regular [ ]Irregular [ ]Tachy  [ ]Bobo [ ]Murmur [ ]Other  GASTROINTESTINAL:  [x]Soft  [ ]Distended   [x]+BS  [ ]Non tender [x ]Tender  [ ]PEG [ ]OGT/ NGT   Last BM: 9/21 per patient, no bm documented in flowsheets  GENITOURINARY:  [x]Normal [ ]Incontinent   [ ]Oliguria/Anuria   [ ]Shore  MUSCULOSKELETAL:   [ ]Normal   [x]Weakness  [ ]Bed/Wheelchair bound [ ]Edema  NEUROLOGIC:   [x]No focal deficits  [ ] Cognitive impairment  [ ] Dysphagia [ ]Dysarthria [ ] Paresis [ ]Other   SKIN:   [x]Normal  [ ]Rash   [ ]Pressure ulcer(s) [ ]y [ ]n present on admission    CRITICAL CARE:  [ ] Shock Present  [ ]Septic [ ]Cardiogenic [ ]Neurologic [ ]Hypovolemic  [ ]  Vasopressors [ ]  Inotropes   [ ]Respiratory failure present [ ]Mechanical ventilation [ ]Non-invasive ventilatory support [ ]High flow    [ ]Acute  [ ]Chronic [ ]Hypoxic  [ ]Hypercarbic [ ]Other  [ ]Other organ failure     LABS:                                             12.3   24.74 )-----------( 330      ( 23 Sep 2022 07:25 )             38.9 09-23    132<L>  |  91<L>  |  50<H>  ----------------------------<  165<H>  4.0   |  27  |  0.59    Ca    10.5      23 Sep 2022 07:17  Phos  2.4     09-23  Mg     2.2     09-23    TPro  6.9  /  Alb  2.9<L>  /  TBili  0.9  /  DBili  x   /  AST  31  /  ALT  30  /  AlkPhos  343<H>  09-23               RADIOLOGY & ADDITIONAL STUDIES: < from: MR Head w/wo IV Cont (09.14.22 @ 19:13) >  ACC: [id number] EXAM:  MR BRAIN WAW IC                          PROCEDURE DATE:  09/14/2022          INTERPRETATION:  CLINICAL INDICATION: Brain metastases. Altered mental   status.    TECHNIQUE: Multi-planar multi-sequential MR imaging of the brain was   performed before and after the intravenous administration of contrast.   7.5 ml of Gadavist IV contrast was administered.    COMPARISON: CT head 9/13/2022.    FINDINGS:  Numerous enhancing intracranial lesions throughout the bilateral cerebral   hemispheres and left cerebellum with surrounding edema. The largest   lesions measure 2.8 x 2.2 x 2.8 cm in the right occipital lobe (4-3), 2.7   x 2.0 x 2.4 cm in the right parietal lobe (4-13) and 2.4 x 1.7 x 1.6 cm   and the left inferior cerebellum (3-12). The left inferior cerebellar   lesion partially effaces the caudal aspect of the fourth ventricle and   left foramen of Luschka.    There is partial effacement of the right lateral ventricle secondary to   surrounding vasogenic edema. However there is mild prominence of the left   lateral ventricle with FLAIR hyperintense signal along the frontal and   occipital horns. Mild hydrocephalus with transependymal CSF flow cannot   be ruled out.     No acute infarct or intracranial hemorrhage identified. No extra-axial   fluid collections. The skull base flow voids are present.    The visualized intraorbital contents are normal. The imaged portions of   the paranasal sinuses are clear. The mastoid air cells are clear. The   visualized osseous structures, soft tissues and partially visualized   parotid glands appear normal.    IMPRESSION:    -Extensive intracranial metastatic disease with surrounding vasogenic   edema. Largest lesions are detailed above.    -Left inferior cerebellar lesion partially effaces the caudal aspect of   the fourth ventricle and left foramen Luschka, with possible associated   mild hydrocephalus.    --- End of Report ---    NIKKY LAM MD; Attending Radiologist  This document has been electronically signed. Sep 15 2022 10:08AM    < end of copied text >        Protein Calorie Malnutrition Present: [ ]mild [ ]moderate [ ]severe [ ]underweight [ ]morbid obesity  https://www.andeal.org/vault/2440/web/files/ONC/Table_Clinical%20Characteristics%20to%20Document%20Malnutrition-White%20JV%20et%20al%202012.pdf    Height (cm): 170.2 (09-16-22 @ 16:54)  Weight (kg): 55.3 (09-16-22 @ 16:54)  BMI (kg/m2): 19.1 (09-16-22 @ 16:54)    [ ]PPSV2 < or = 30%  [ ]significant weight loss [ ]poor nutritional intake [ ]anasarca[ ]Artificial Nutrition    Other REFERRALS:  [ ]Hospice  [ ]Child Life  [ ]Social Work  [ ]Case management [ ]Holistic Therapy     Goals of Care Document:

## 2022-09-24 NOTE — SWALLOW BEDSIDE ASSESSMENT ADULT - COMMENTS
9/21> ID consulted for new elevated WBC, most likely etiology from steroids could be in response to extensive mets, DVTs, part of leukemoid reaction to CA. Pt not coughing. BC, UA, CXR, stool w/u. If family opts for full intervention, swallow study also may be rx.     9/22> new onset weakness and pt obtunded. S&S eval placed. CTH stable.    Rad/Onc> Family plan as discussed with Dr. Tran/ Dr. Nathan is outpatient gammaknife radiosurgery after discharge.    GOC> full code.    IR> Pt with PE/ DVT unable to be on AC due to cerebral hemorrhage. IR c/s for IVC filter placement. 9/16 s/p IVC filter.     Card> consulted for SOB, ECHO notes normal EF and b/l pleural effusion w/ increase in pro bnp. d/c IVF. BP under control at this time.    CTh 9/22 IMPRESSION: Numerous hyperattenuating lesions throughout the bilateral cerebri and left cerebellum, with surrounding vasogenic edema, compatible with known metastases, as seen on prior MRI brain of 9/14/2022. No acute hemorrhage   is identified.    CXR 9/21 IMPRESSION:Unchanged scattered bilateral nodular opacities consistent with   metastatic disease.Unchanged small bilateral pleural effusions.    pt new to this service

## 2022-09-24 NOTE — SWALLOW BEDSIDE ASSESSMENT ADULT - ASPIRATION PRECAUTIONS
Monitor for s/s aspiration/laryngeal penetration. If noted:  D/C p.o. intake, provide non-oral nutrition/hydration/meds, and contact this service @ [phone]

## 2022-09-24 NOTE — PROGRESS NOTE ADULT - SUBJECTIVE AND OBJECTIVE BOX
afberile  REVIEW OF SYSTEMS:  GEN: no fever,    no chills  RESP: no SOB,   no cough  CVS: no chest pain,   no palpitations  GI: no abdominal pain,   no nausea,   no vomiting,   no constipation,   no diarrhea  : no dysuria,   no frequency  NEURO: no headache,   no dizziness  PSYCH: no depression,   not anxious  Derm : no rash    MEDICATIONS  (STANDING):  bisacodyl 5 milliGRAM(s) Oral at bedtime  dexAMETHasone  Injectable 4 milliGRAM(s) IV Push four times a day  dextrose 5% + sodium chloride 0.45%. 1000 milliLiter(s) (70 mL/Hr) IV Continuous <Continuous>  levETIRAcetam  IVPB 500 milliGRAM(s) IV Intermittent every 12 hours  metoprolol succinate ER 25 milliGRAM(s) Oral daily  potassium phosphate IVPB 15 milliMole(s) IV Intermittent once  senna 2 Tablet(s) Oral at bedtime    MEDICATIONS  (PRN):  oxyCODONE    IR 10 milliGRAM(s) Oral every 4 hours PRN Moderate Pain (4 - 6)  oxyCODONE    IR 15 milliGRAM(s) Oral every 4 hours PRN Severe Pain (7 - 10)      Vital Signs Last 24 Hrs  T(C): 36.3 (24 Sep 2022 05:19), Max: 36.4 (23 Sep 2022 21:33)  T(F): 97.4 (24 Sep 2022 05:19), Max: 97.6 (23 Sep 2022 21:33)  HR: 85 (24 Sep 2022 05:19) (85 - 95)  BP: 136/78 (24 Sep 2022 05:19) (136/78 - 146/71)  BP(mean): --  RR: 18 (24 Sep 2022 05:19) (18 - 19)  SpO2: 96% (24 Sep 2022 05:19) (94% - 97%)    Parameters below as of 24 Sep 2022 05:19  Patient On (Oxygen Delivery Method): nasal cannula  O2 Flow (L/min): 4    CAPILLARY BLOOD GLUCOSE        I&O's Summary    23 Sep 2022 07:01  -  24 Sep 2022 07:00  --------------------------------------------------------  IN: 240 mL / OUT: 0 mL / NET: 240 mL        PHYSICAL EXAM:  HEAD:  Atraumatic, Normocephalic  NECK: Supple, No   JVD  CHEST/LUNG:   no     rales,     no,    rhonchi  HEART: Regular rate and rhythm;         murmur  ABDOMEN: Soft, Nontender, ;   EXTREMITIES:  no      edema  NEUROLOGY:  alert    LABS:                        12.3   24.74 )-----------( 330      ( 23 Sep 2022 07:25 )             38.9     09-23    132<L>  |  91<L>  |  50<H>  ----------------------------<  165<H>  4.0   |  27  |  0.59    Ca    10.5      23 Sep 2022 07:17  Phos  2.4     09-23  Mg     2.2     09-23    TPro  6.9  /  Alb  2.9<L>  /  TBili  0.9  /  DBili  x   /  AST  31  /  ALT  30  /  AlkPhos  343<H>  09-23                            Consultant(s) Notes Reviewed:      Care Discussed with Consultants/Other Providers:

## 2022-09-25 NOTE — PROGRESS NOTE ADULT - ASSESSMENT
77 yo F        h/o colon CA with  mets    no longer on oral chemo x  past  1 mo     p/w AMS x4 weeks, worsening over the last few days.  had  c/o right sided rib pain for weeks  and .sob  on exertion x days.     pmd  dr coffman   sent pt in ,  presented to her with AMS, worsening weakness and lethargy.     Per , pt slipped and fell in the bathroom while getting up from the toilet 3 weeks ago.       admitted   with  ams.  from cerebral   mets  with bleed.  able to  converse   h/o  metastatic Ca colon, oncologist dr jose enrique ignacio   N/S  eval, no intervention,   pt has no focal weakness   CT  chest angio. :  ,Pe, b/l pl effusions, hepatic mets /   T4 comp fx, with bony retropulsion,     TLSO  brace  per  N/S     pt  with +   PE/ R  leg  DVT,   unable to  a/c pt , given cerebral bleed/ family  aware of risks, s/p  IVC filter   wbc  noted. from malignancy  afebrile , , fall risk/ PT eval   oncology dr bullock  ,  on Decadron  for  h'gic mets, seen by  N/S         aware of  poor prognosis.  family   had  2  meetings  with palliative care,, and,  pt  remains  full code  rx  plan per  dr bullock  is  palliative  in nature,  RT  to  brain / agree  with  oncology  that,  pt  ideal  candidate  for  comfort  care/  hospice  spoke with  daughter ,  pt remains  full  code   per   N/S,  gammaknife   Rt,  a s an out pt     elevated  wbc  from decadron. pt is  afberile      daughter   wants  all done/  rpt ct  head  was  stable from 9/ 22    spoke  at length  with spouse  and daughter, clearly  stated  that  pt si  full code. and   that  family does not wish to pursue  any  more  meetings  and,   this  was  confirmed  by  conversation between   vahe lucio   and  daughter  also    and  dr bullock  has  spoken at length with daughter / daughter and  [facility]  onc will  decide  on RT   daughter  has stated, that  per  Rad onc , Gamma knife   RT can only be  done as  an out pt  and hence , no need  for  transfer  and  also, she does  not want WBRT   pt  has improved, upon lowering pain meds. more  alert and  daughter in agreement  on  decdron, keppra, torpol, oxycodone  daughter  wants rehab/  may start   d/c planning   on prn pain meds  at  lower  dose. pt  is alert/  on po feeds      daughter  Alissa , asking clarification  from care cortn if transport can be arranged for  RT  from rehab         rad< from: CT Head No Cont (09.13.22 @ 18:46) >  RESSION:  Multiple hyperdense nodules are may represent hemorrhagic metastasis in a   patient with history of colon carcinoma. Further evaluation with contrast   enhanced brain MRI is recommended.  No acute intracranial hemorrhage.  --- End of Report ---       RAD< from: CT Abdomen and Pelvis w/ IV Cont (09.13.22 @ 18:48) >  IMPRESSION:  Segmental and subsegmental acute pulmonary arterial emboli as detailed   above. Presence of pulmonary embolism was discussed with Dr. Aguirre by Dr. Orta on September 13, 2022 at 8:03 PM.  Extensive metastatic disease as detailed above.  Small multiloculated bilateral pleural effusions.  Compression fracture deformity of the T4 vertebral body with some   retropulsion of the fractured vertebral body into the spinal canal.   Dedicated thoracic spine MRI can be performed for complete evaluation as   clinically warranted.  --- End of Report ---                   75 yo F        h/o colon CA with  mets    no longer on oral chemo x  past  1 mo     p/w AMS x4 weeks, worsening over the last few days.  had  c/o right sided rib pain for weeks  and .sob  on exertion x days.     pmd  dr coffman   sent pt in ,  presented to her with AMS, worsening weakness and lethargy.     Per , pt slipped and fell in the bathroom while getting up from the toilet 3 weeks ago.       admitted   with  ams.  from cerebral   mets  with bleed.  able to  converse   h/o  metastatic Ca colon, oncologist dr jose enrique ignacio   N/S  eval, no intervention,   pt has no focal weakness   CT  chest angio. :  ,Pe, b/l pl effusions, hepatic mets /   T4 comp fx, with bony retropulsion,     TLSO  brace  per  N/S     pt  with +   PE/ R  leg  DVT,   unable to  a/c pt , given cerebral bleed/ family  aware of risks, s/p  IVC filter   wbc  noted. from malignancy  afebrile , , fall risk/ PT eval   oncology dr bullock  ,  on Decadron  for  h'gic mets, seen by  N/S         aware of  poor prognosis.  family   had  2  meetings  with palliative care,, and,  pt  remains  full code  rx  plan per  dr bullock  is  palliative  in nature,  RT  to  brain / agree  with  oncology  that,  pt  ideal  candidate  for  comfort  care/  hospice  spoke with  daughter ,  pt remains  full  code   per   N/S,  gammaknife   Rt,  a s an out pt     elevated  wbc  from decadron. pt is  afberile      daughter   wants  all done/  rpt ct  head  was  stable from 9/ 22    spoke  at length  with spouse  and daughter, clearly  stated  that  pt si  full code. and   that  family does not wish to pursue  any  more  meetings  and,   this  was  confirmed  by  conversation between   vahe lucio   and  daughter  also    and  dr bullock  has  spoken at length with daughter / daughter and  Rad  onc will  decide  on RT   daughter  has stated, that  per  Rad onc , Gamma knife   RT can only be  done as  an out pt  and hence , no need  for  transfer  and  also, she does  not want WBRT   pt  has improved, upon lowering pain meds. more  alert and  daughter in agreement  on  decdron, keppra, torpol, oxycodone  daughter  wants rehab/  may start   d/c planning   on prn pain meds  at  lower  dose. pt  is alert/  on po feeds      daughter  Alissa , asking clarification  from care cortn if transport can be arranged for  RT  from rehab    Alissa (Daughter)  [phone],        rad< from: CT Head No Cont (09.13.22 @ 18:46) >  RESSION:  Multiple hyperdense nodules are may represent hemorrhagic metastasis in a   patient with history of colon carcinoma. Further evaluation with contrast   enhanced brain MRI is recommended.  No acute intracranial hemorrhage.  --- End of Report ---       RAD< from: CT Abdomen and Pelvis w/ IV Cont (09.13.22 @ 18:48) >  IMPRESSION:  Segmental and subsegmental acute pulmonary arterial emboli as detailed   above. Presence of pulmonary embolism was discussed with Dr. Aguirre by Dr. Orta on September 13, 2022 at 8:03 PM.  Extensive metastatic disease as detailed above.  Small multiloculated bilateral pleural effusions.  Compression fracture deformity of the T4 vertebral body with some   retropulsion of the fractured vertebral body into the spinal canal.   Dedicated thoracic spine MRI can be performed for complete evaluation as   clinically warranted.  --- End of Report ---                   77 yo F        h/o colon CA with  mets    no longer on oral chemo x  past  1 mo     p/w AMS x4 weeks, worsening over the last few days.  had  c/o right sided rib pain for weeks  and .sob  on exertion x days.     pmd  dr coffman   sent pt in ,  presented to her with AMS, worsening weakness and lethargy.     Per , pt slipped and fell in the bathroom while getting up from the toilet 3 weeks ago.       admitted   with  ams.  from cerebral   mets  with bleed.  able to  converse   h/o  metastatic Ca colon, oncologist dr jose enrique ignacio   N/S  eval, no intervention,   pt has no focal weakness   CT  chest angio. :  ,Pe, b/l pl effusions, hepatic mets /   T4 comp fx, with bony retropulsion,     TLSO  brace  per  N/S     pt  with +   PE/ R  leg  DVT,   unable to  a/c pt , given cerebral bleed/ family  aware of risks, s/p  IVC filter   wbc  noted. from malignancy  afebrile , , fall risk/ PT eval   oncology dr bullock  ,  on Decadron  for  h'gic mets, seen by  N/S         aware of  poor prognosis.  family   had  2  meetings  with palliative care,, and,  pt  remains  full code  rx  plan per  dr bullock  is  palliative  in nature,  RT  to  brain / agree  with  oncology  that,  pt  ideal  candidate  for  comfort  care/  hospice  spoke with  daughter ,  pt remains  full  code   per   N/S,  gammaknife   Rt,  a s an out pt     elevated  wbc  from decadron. pt is  afberile      daughter   wants  all done/  rpt ct  head  was  stable from 9/ 22    spoke  at length  with spouse  and daughter, clearly  stated  that  pt si  full code. and   that  family does not wish to pursue  any  more  meetings  and,   this  was  confirmed  by  conversation between   vahe lucio   and  daughter  also    and  dr bullock  has  spoken at length with daughter / daughter and  Rad  onc will  decide  on RT   daughter  has stated, that  per  Rad onc , Gamma knife   RT can only be  done as  an out pt  and hence , no need  for  transfer  and  also, she does  not want WBRT   pt  has improved, upon lowering pain meds. more  alert and  daughter in agreement  on  decdron, keppra, torpol, oxycodone  daughter  wants rehab/  may start   d/c planning   on prn pain meds  at  lower  dose. pt  is alert/  on po feeds      daughter , Alissa , asking clarification  from care cortn if transport can be arranged for  RT  from rehab      Alissa  [phone],        rad< from: CT Head No Cont (09.13.22 @ 18:46) >  RESSION:  Multiple hyperdense nodules are may represent hemorrhagic metastasis in a   patient with history of colon carcinoma. Further evaluation with contrast   enhanced brain MRI is recommended.  No acute intracranial hemorrhage.  --- End of Report ---       RAD< from: CT Abdomen and Pelvis w/ IV Cont (09.13.22 @ 18:48) >  IMPRESSION:  Segmental and subsegmental acute pulmonary arterial emboli as detailed   above. Presence of pulmonary embolism was discussed with Dr. Aguirre by Dr. Orta on September 13, 2022 at 8:03 PM.  Extensive metastatic disease as detailed above.  Small multiloculated bilateral pleural effusions.  Compression fracture deformity of the T4 vertebral body with some   retropulsion of the fractured vertebral body into the spinal canal.   Dedicated thoracic spine MRI can be performed for complete evaluation as   clinically warranted.  --- End of Report ---

## 2022-09-25 NOTE — PROGRESS NOTE ADULT - SUBJECTIVE AND OBJECTIVE BOX
CARDIOLOGY     PROGRESS  NOTE   ________________________________________________    CHIEF COMPLAINT:Patient is a 76y old  Female who presents with a chief complaint of ams/ sob (25 Sep 2022 08:08)  more awake and alert.  	  REVIEW OF SYSTEMS:  CONSTITUTIONAL: No fever, weight loss, or fatigue  EYES: No eye pain, visual disturbances, or discharge  ENT:  No difficulty hearing, tinnitus, vertigo; No sinus or throat pain  NECK: No pain or stiffness  RESPIRATORY: No cough, wheezing, chills or hemoptysis; No Shortness of Breath  CARDIOVASCULAR: No chest pain, palpitations, passing out, dizziness, or leg swelling  GASTROINTESTINAL: No abdominal or epigastric pain. No nausea, vomiting, or hematemesis; No diarrhea or constipation. No melena or hematochezia.  GENITOURINARY: No dysuria, frequency, hematuria, or incontinence  NEUROLOGICAL: No headaches, memory loss, loss of strength, numbness, or tremors  SKIN: No itching, burning, rashes, or lesions   LYMPH Nodes: No enlarged glands  ENDOCRINE: No heat or cold intolerance; No hair loss  MUSCULOSKELETAL: No joint pain or swelling; No muscle, back, or extremity pain  PSYCHIATRIC: No depression, anxiety, mood swings, or difficulty sleeping  HEME/LYMPH: No easy bruising, or bleeding gums  ALLERGY AND IMMUNOLOGIC: No hives or eczema	    [ ] All others negative	  [x ] Unable to obtain    PHYSICAL EXAM:  T(C): 36.4 (09-25-22 @ 05:19), Max: 36.6 (09-24-22 @ 20:18)  HR: 85 (09-25-22 @ 05:19) (85 - 101)  BP: 149/84 (09-25-22 @ 05:19) (141/84 - 157/94)  RR: 18 (09-25-22 @ 05:19) (17 - 18)  SpO2: 99% (09-25-22 @ 05:19) (94% - 99%)  Wt(kg): --  I&O's Summary    24 Sep 2022 07:01  -  25 Sep 2022 07:00  --------------------------------------------------------  IN: 120 mL / OUT: 0 mL / NET: 120 mL        Appearance: Normal	  HEENT:   Normal oral mucosa, PERRL, EOMI	  Lymphatic: No lymphadenopathy  Cardiovascular: Normal S1 S2, No JVD, + murmurs, No edema  Respiratory: rhonchi  Psychiatry: A & O x 3, Mood & affect appropriate  Gastrointestinal:  Soft, Non-tender, + BS	  Skin: No rashes, No ecchymoses, No cyanosis	  Neurologic: Non-focal  Extremities: Normal range of motion, No clubbing, cyanosis or edema  Vascular: Peripheral pulses palpable 2+ bilaterally    MEDICATIONS  (STANDING):  bisacodyl 5 milliGRAM(s) Oral at bedtime  dexAMETHasone  Injectable 4 milliGRAM(s) IV Push four times a day  levETIRAcetam  IVPB 500 milliGRAM(s) IV Intermittent every 12 hours  metoprolol succinate ER 25 milliGRAM(s) Oral daily  potassium phosphate IVPB 15 milliMole(s) IV Intermittent once  senna 2 Tablet(s) Oral at bedtime      TELEMETRY: 	    ECG:  	  RADIOLOGY:  OTHER: 	  	  LABS:	 	    CARDIAC MARKERS:                                12.1   26.32 )-----------( 286      ( 25 Sep 2022 07:08 )             39.3     09-25    131<L>  |  93<L>  |  28<H>  ----------------------------<  178<H>  4.4   |  25  |  0.42<L>    Ca    10.1      25 Sep 2022 07:06  Phos  2.1     09-25      proBNP: Serum Pro-Brain Natriuretic Peptide: 2287 pg/mL (09-13 @ 18:51)    Lipid Profile:   HgA1c:   TSH:         Assessment and plan  ---------------------------  77 yo F h/o colon CA with  mets was on   chemo ,  no longer on oral chemo x  past  1 mo p/w AMS x4 weeks, worsening over the last few days.     had also been c/o right sided rib pain for weeks as well.sob  on exertion x days.     pmd  d r brand   sent pt in for further evaluation as pt presented to her with AMS, worsening weakness and lethargy.     Per , pt slipped and fell in the bathroom while getting up from the toilet 3 weeks ago.    Unsure if she had any LOC. Has had unsteady gait since.      +intermittent episodes of diarrhea     Denies nausea, vomiting, fever, chills, cough, chest pain, blood in stool, urinary complaints, headache.   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  AC contraindicated  pt needs IVC filter any way despite doppler  echo in er noted with normal ef and bl pleural effusion with increase pro bnp  dc ivf  increase beta blocker for bp control, controlled now  PE on no AC sec to brain hemorrhagic mets  palliative care noted  ID noted with increase wbc  oob to chair with assistance

## 2022-09-25 NOTE — PROGRESS NOTE ADULT - SUBJECTIVE AND OBJECTIVE BOX
afberile  REVIEW OF SYSTEMS:  GEN: no fever,    no chills  RESP: no SOB,   no cough  CVS: no chest pain,   no palpitations  GI: no abdominal pain,   no nausea,   no vomiting,   no constipation,   no diarrhea  : no dysuria,   no frequency  NEURO: no headache,   no dizziness  PSYCH: no depression,   not anxious  Derm : no rash    MEDICATIONS  (STANDING):  bisacodyl 5 milliGRAM(s) Oral at bedtime  dexAMETHasone  Injectable 4 milliGRAM(s) IV Push four times a day  dextrose 5% + sodium chloride 0.45%. 1000 milliLiter(s) (70 mL/Hr) IV Continuous <Continuous>  levETIRAcetam  IVPB 500 milliGRAM(s) IV Intermittent every 12 hours  metoprolol succinate ER 25 milliGRAM(s) Oral daily  senna 2 Tablet(s) Oral at bedtime    MEDICATIONS  (PRN):  oxyCODONE    IR 10 milliGRAM(s) Oral every 4 hours PRN Moderate Pain (4 - 6)  oxyCODONE    IR 15 milliGRAM(s) Oral every 4 hours PRN Severe Pain (7 - 10)      Vital Signs Last 24 Hrs  T(C): 36.4 (25 Sep 2022 05:19), Max: 36.6 (24 Sep 2022 20:18)  T(F): 97.5 (25 Sep 2022 05:19), Max: 97.8 (24 Sep 2022 20:18)  HR: 85 (25 Sep 2022 05:19) (85 - 101)  BP: 149/84 (25 Sep 2022 05:19) (141/84 - 157/94)  BP(mean): --  RR: 18 (25 Sep 2022 05:19) (17 - 18)  SpO2: 99% (25 Sep 2022 05:19) (94% - 99%)    Parameters below as of 25 Sep 2022 05:19  Patient On (Oxygen Delivery Method): nasal cannula  O2 Flow (L/min): 4    CAPILLARY BLOOD GLUCOSE        I&O's Summary    24 Sep 2022 07:01  -  25 Sep 2022 07:00  --------------------------------------------------------  IN: 120 mL / OUT: 0 mL / NET: 120 mL        PHYSICAL EXAM:  HEAD:  Atraumatic, Normocephalic  NECK: Supple, No   JVD  CHEST/LUNG:   no     rales,     no,    rhonchi  HEART: Regular rate and rhythm;         murmur  ABDOMEN: Soft, Nontender, ;   EXTREMITIES:  no      edema  NEUROLOGY:  alert    LABS:                        12.3   24.74 )-----------( 330      ( 23 Sep 2022 07:25 )             38.9     09-23    132<L>  |  91<L>  |  50<H>  ----------------------------<  165<H>  4.0   |  27  |  0.59    Ca    10.5      23 Sep 2022 07:17  Phos  2.4     09-23  Mg     2.2     09-23    TPro  6.9  /  Alb  2.9<L>  /  TBili  0.9  /  DBili  x   /  AST  31  /  ALT  30  /  AlkPhos  343<H>  09-23                            Consultant(s) Notes Reviewed:      Care Discussed with Consultants/Other Providers:

## 2022-09-25 NOTE — PROGRESS NOTE ADULT - PROBLEM SELECTOR PLAN 5
In the event of newly developing, evolving, or worsening symptoms, please contact the Palliative Medicine team via pager (if the patient is at [facility] #[phone] or if the patient is at [facility] #[phone]) The Geriatric and Palliative Medicine service has coverage 24 hours a day/ 7 days a week to provide medical recommendations regarding symptom management needs via telephone

## 2022-09-25 NOTE — PROGRESS NOTE ADULT - PROBLEM SELECTOR PLAN 1
Currently ordered for Oxycodone 10mg q6h PRN moderate pain (Two used)  Currently ordered for Oxycodone 15mg q6h PRN severe pain  (Two used)    She is reporting suboptimal pain control  Consider Oxy 15 mg q8H ATC  Consider Oxy 10 q4H PRN

## 2022-09-25 NOTE — PROGRESS NOTE ADULT - SUBJECTIVE AND OBJECTIVE BOX
Indication for Geriatrics and Palliative Care Services/INTERVAL HPI: symptom management and GOC in setting of advanced malignancy           09-25-22 @ 08:10  [facility] [id number]    Overnight events: No major events  Staff reports: n/a  Patient: PT reports worsening pain. She feels yesterday was much better.  Pain max: 10  Pain min: 8   Personalized pain goal: 7  Pain now: 8    PRN use:  Oxy 15 mg X 2  Oxy 10 mg x 2      RECENT VITALS/LABS/MEDICATIONS/ASSAYS     09-25-22 @ 08:10    T(C): 36.4 (09-25-22 @ 05:19), Max: 36.6 (09-24-22 @ 20:18)  HR: 85 (09-25-22 @ 05:19) (85 - 101)  BP: 149/84 (09-25-22 @ 05:19) (141/84 - 157/94)  RR: 18 (09-25-22 @ 05:19) (17 - 18)  SpO2: 99% (09-25-22 @ 05:19) (94% - 99%)  Wt(kg): --      24 Sep 2022 07:01  -  25 Sep 2022 07:00  --------------------------------------------------------  IN:    Oral Fluid: 120 mL  Total IN: 120 mL    OUT:  Total OUT: 0 mL    Total NET: 120 mL          09-24 @ 07:01  -  09-25 @ 07:00  --------------------------------------------------------  IN: 120 mL / OUT: 0 mL / NET: 120 mL      CAPILLARY BLOOD GLUCOSE            bisacodyl 5 milliGRAM(s) Oral at bedtime  dexAMETHasone  Injectable 4 milliGRAM(s) IV Push four times a day  dextrose 5% + sodium chloride 0.45%. 1000 milliLiter(s) IV Continuous <Continuous>  levETIRAcetam  IVPB 500 milliGRAM(s) IV Intermittent every 12 hours  metoprolol succinate ER 25 milliGRAM(s) Oral daily  oxyCODONE    IR 10 milliGRAM(s) Oral every 4 hours PRN  oxyCODONE    IR 15 milliGRAM(s) Oral every 4 hours PRN  senna 2 Tablet(s) Oral at bedtime                  Procalc  BNP  ABG                          12.1   26.32 )-----------( 286      ( 25 Sep 2022 07:08 )             39.3           blood and urine cultures              DNR on chart:  Allergies    Levaquin (Flushing)    Intolerances    MEDICATIONS  (STANDING):  dexAMETHasone  Injectable 4 milliGRAM(s) IV Push four times a day  dextrose 5% + sodium chloride 0.45%. 1000 milliLiter(s) (70 mL/Hr) IV Continuous <Continuous>  levETIRAcetam  IVPB 500 milliGRAM(s) IV Intermittent every 12 hours  metoprolol succinate ER 25 milliGRAM(s) Oral daily    MEDICATIONS  (PRN):    ITEMS UNCHECKED ARE NOT PRESENT    PRESENT SYMPTOMS: [ ]Unable to self-report - see [ ] CPOT [ ] PAINADS [ ] RDOS  Source if other than patient:  [ ]Family   [ ]Team     Pain: [x ]yes [ ]no  QOL impact - impacts ADLS  Location - epigastric region and right side of abdomen/flank region                   Aggravating factors - movement  Quality - throbbing  Radiation - none  Timing- constant   Severity (0-10 scale): 10  Minimal acceptable level (0-10 scale): 2-3      CPOT:    https://www.[facility].org/getattachment/[email or web address]/Critical-Care-Pain-Observation-Tool-(CPOT)    Dyspnea:                           [ ]Mild [ ]Moderate [ ]Severe  Anxiety:                             [ ]Mild [ ]Moderate [ ]Severe  Fatigue:                             [ ]Mild [ ]Moderate [ ]Severe  Nausea:                             [ ]Mild [ ]Moderate [ ]Severe  Loss of appetite:              [ ]Mild [ ]Moderate [ ]Severe  Constipation:                    [ ]Mild [ ]Moderate [ ]Severe    PCSSQ[Palliative Care Spiritual Screening Question]   Severity (0-10):  Score of 4 or > indicate consideration of Chaplaincy referral.  Chaplaincy Referral: [ ] yes [x ] refused [ ] following [ ] Deferred     Caregiver [place]? : [ ] yes [x ] no [ ] Deferred [ ] Declined             Social work referral [ ] Patient & Family Centered Care Referral [ ]     Anticipatory Grief present?:  [ ] yes [x ] no  [ ] Deferred                  Social work referral [ ] Chaplaincy Referral[ ]      Other Symptoms:  [x ]All other review of systems negative   [ ]Unable to obtain due to poor mentation    Palliative Performance Status Version 2:   30     %      http://[facility].org/files/news/palliative_performance_scale_ppsv2.pdf  PHYSICAL EXAM:  Vital Signs Last 24 Hrs  T(C): 36.3 (22 Sep 2022 11:36), Max: 36.3 (21 Sep 2022 19:44)  T(F): 97.4 (22 Sep 2022 11:36), Max: 97.4 (22 Sep 2022 00:54)  HR: 95 (22 Sep 2022 11:36) (89 - 108)  BP: 128/77 (22 Sep 2022 11:36) (125/84 - 142/78)  BP(mean): --  RR: 20 (22 Sep 2022 04:27) (19 - 20)  SpO2: 94% (22 Sep 2022 11:36) (86% - 94%)    Parameters below as of 22 Sep 2022 11:36  Patient On (Oxygen Delivery Method): mask, Venturi    GENERAL:  [x]Alert  [x]Oriented x 2  [ ]Lethargic  [ ]Cachexia  [ ]Unarousable  [x]Verbal  [ ]Non-Verbal  Behavioral:   [ ]Anxiety  [ ]Delirium [ ]Agitation [ ]Other  HEENT:  [x]Normal   [ ]Dry mouth   [ ]ET Tube/Trach  [ ]Oral lesions  PULMONARY:   [x]Clear [ ]Tachypnea  [ ]Audible excessive secretions   [ ]Rhonchi        [ ]Right [ ]Left [ ]Bilateral  [ ]Crackles        [ ]Right [ ]Left [ ]Bilateral  [ ]Wheezing     [ ]Right [ ]Left [ ]Bilateral  [ ]Diminished BS [ ] Right [ ]Left [ ]Bilateral  CARDIOVASCULAR:    [x]Regular [ ]Irregular [ ]Tachy  [ ]Bobo [ ]Murmur [ ]Other  GASTROINTESTINAL:  [x]Soft  [ ]Distended   [x]+BS  [ ]Non tender [x ]Tender  [ ]PEG [ ]OGT/ NGT   Last BM: 9/21 per patient, no bm documented in flowsheets  GENITOURINARY:  [x]Normal [ ]Incontinent   [ ]Oliguria/Anuria   [ ]Shore  MUSCULOSKELETAL:   [ ]Normal   [x]Weakness  [ ]Bed/Wheelchair bound [ ]Edema  NEUROLOGIC:   [x]No focal deficits  [ ] Cognitive impairment  [ ] Dysphagia [ ]Dysarthria [ ] Paresis [ ]Other   SKIN:   [x]Normal  [ ]Rash   [ ]Pressure ulcer(s) [ ]y [ ]n present on admission    CRITICAL CARE:  [ ] Shock Present  [ ]Septic [ ]Cardiogenic [ ]Neurologic [ ]Hypovolemic  [ ]  Vasopressors [ ]  Inotropes   [ ]Respiratory failure present [ ]Mechanical ventilation [ ]Non-invasive ventilatory support [ ]High flow    [ ]Acute  [ ]Chronic [ ]Hypoxic  [ ]Hypercarbic [ ]Other  [ ]Other organ failure     LABS:       RADIOLOGY & ADDITIONAL STUDIES: < from: MR Head w/wo IV Cont (09.14.22 @ 19:13) >  ACC: [id number] EXAM:  MR BRAIN WAW IC                          PROCEDURE DATE:  09/14/2022          INTERPRETATION:  CLINICAL INDICATION: Brain metastases. Altered mental   status.    TECHNIQUE: Multi-planar multi-sequential MR imaging of the brain was   performed before and after the intravenous administration of contrast.   7.5 ml of Gadavist IV contrast was administered.    COMPARISON: CT head 9/13/2022.    FINDINGS:  Numerous enhancing intracranial lesions throughout the bilateral cerebral   hemispheres and left cerebellum with surrounding edema. The largest   lesions measure 2.8 x 2.2 x 2.8 cm in the right occipital lobe (4-3), 2.7   x 2.0 x 2.4 cm in the right parietal lobe (4-13) and 2.4 x 1.7 x 1.6 cm   and the left inferior cerebellum (3-12). The left inferior cerebellar   lesion partially effaces the caudal aspect of the fourth ventricle and   left foramen of Luschka.    There is partial effacement of the right lateral ventricle secondary to   surrounding vasogenic edema. However there is mild prominence of the left   lateral ventricle with FLAIR hyperintense signal along the frontal and   occipital horns. Mild hydrocephalus with transependymal CSF flow cannot   be ruled out.     No acute infarct or intracranial hemorrhage identified. No extra-axial   fluid collections. The skull base flow voids are present.    The visualized intraorbital contents are normal. The imaged portions of   the paranasal sinuses are clear. The mastoid air cells are clear. The   visualized osseous structures, soft tissues and partially visualized   parotid glands appear normal.    IMPRESSION:    -Extensive intracranial metastatic disease with surrounding vasogenic   edema. Largest lesions are detailed above.    -Left inferior cerebellar lesion partially effaces the caudal aspect of   the fourth ventricle and left foramen Luschka, with possible associated   mild hydrocephalus.    --- End of Report ---    NIKKY LAM MD; Attending Radiologist  This document has been electronically signed. Sep 15 2022 10:08AM    < end of copied text >        Protein Calorie Malnutrition Present: [ ]mild [ ]moderate [ ]severe [ ]underweight [ ]morbid obesity  https://www.andeal.org/vault/2440/web/files/ONC/Table_Clinical%20Characteristics%20to%20Document%20Malnutrition-White%20JV%20et%20al%202012.pdf    Height (cm): 170.2 (09-16-22 @ 16:54)  Weight (kg): 55.3 (09-16-22 @ 16:54)  BMI (kg/m2): 19.1 (09-16-22 @ 16:54)    [ ]PPSV2 < or = 30%  [ ]significant weight loss [ ]poor nutritional intake [ ]anasarca[ ]Artificial Nutrition    Other REFERRALS:  [ ]Hospice  [ ]Child Life  [ ]Social Work  [ ]Case management [ ]Holistic Therapy     Goals of Care Document:

## 2022-09-26 NOTE — PROGRESS NOTE ADULT - SUBJECTIVE AND OBJECTIVE BOX
Indication for Geriatrics and Palliative Care Services/INTERVAL HPI: symptom management and GOC in setting of advanced malignancy  SUBJECTIVE AND OBJECTIVE: Pt seen and examined at bedside with daughter present. Pt intermittently requiring NRB for hypoxia this AM.     OVERNIGHT EVENTS: Required PRN PO oxycodone 10mg x2 and PO oxycodone 15mg x2 in last 24 hours.    DNR on chart:  Allergies    Levaquin (Flushing)    Intolerances    MEDICATIONS  (STANDING):  bisacodyl 5 milliGRAM(s) Oral at bedtime  dexAMETHasone     Tablet 4 milliGRAM(s) Oral every 6 hours  levETIRAcetam 500 milliGRAM(s) Oral two times a day  metoprolol succinate ER 50 milliGRAM(s) Oral daily  metoprolol succinate ER 25 milliGRAM(s) Oral once  senna 2 Tablet(s) Oral at bedtime    MEDICATIONS  (PRN):  oxyCODONE    IR 10 milliGRAM(s) Oral every 4 hours PRN Moderate Pain (4 - 6)  oxyCODONE    IR 15 milliGRAM(s) Oral every 4 hours PRN Severe Pain (7 - 10)    ITEMS UNCHECKED ARE NOT PRESENT    PRESENT SYMPTOMS: [ ]Unable to self-report - see [ ] CPOT [ ] PAINADS [ ] RDOS  Source if other than patient:  [ ]Family   [ ]Team     Pain: [x ]yes [ ]no  QOL impact - impacts ADLS  Location - epigastric region and right side of abdomen/flank region                   Aggravating factors - movement  Quality - throbbing  Radiation - none  Timing- constant   Severity (0-10 scale): 10  Minimal acceptable level (0-10 scale): 2-3      CPOT:    https://[facility].org/getattachment/[email or web address]/Critical-Care-Pain-Observation-Tool-(CPOT)    Dyspnea:                           [ ]Mild [ ]Moderate [ ]Severe  Anxiety:                             [ ]Mild [ ]Moderate [ ]Severe  Fatigue:                             [ ]Mild [ ]Moderate [ ]Severe  Nausea:                             [ ]Mild [ ]Moderate [ ]Severe  Loss of appetite:              [ ]Mild [ ]Moderate [ ]Severe  Constipation:                    [ ]Mild [ ]Moderate [ ]Severe    PCSSQ[Palliative Care Spiritual Screening Question]   Severity (0-10):  Score of 4 or > indicate consideration of Chaplaincy referral.  Chaplaincy Referral: [ ] yes [x ] refused [ ] following [ ] Deferred     Caregiver [place]? : [ ] yes [x ] no [ ] Deferred [ ] Declined             Social work referral [ ] Patient & Family Centered Care Referral [ ]     Anticipatory Grief present?:  [ ] yes [x ] no  [ ] Deferred                  Social work referral [ ] Chaplaincy Referral[ ]      Other Symptoms:  [x ]All other review of systems negative   [ ]Unable to obtain due to poor mentation    Palliative Performance Status Version 2:   30     %      http://npcrc.org/files/news/palliative_performance_scale_ppsv2.pdf  PHYSICAL EXAM:    Vital Signs Last 24 Hrs  T(C): 36.5 (26 Sep 2022 12:01), Max: 36.5 (26 Sep 2022 04:17)  T(F): 97.7 (26 Sep 2022 12:01), Max: 97.7 (26 Sep 2022 04:17)  HR: 105 (26 Sep 2022 12:01) (91 - 108)  BP: 143/80 (26 Sep 2022 12:01) (135/79 - 161/93)  BP(mean): --  RR: 22 (26 Sep 2022 12:38) (18 - 30)  SpO2: 93% (26 Sep 2022 12:38) (84% - 94%)    Parameters below as of 26 Sep 2022 12:38  Patient On (Oxygen Delivery Method): mask, nonrebreather  O2 Flow (L/min): 15      GENERAL:  [x]Alert  [x]Oriented x 2 (persion and place)  [ ]Lethargic  [ ]Cachexia  [ ]Unarousable  [x]Verbal  [ ]Non-Verbal  Behavioral:   [ ]Anxiety  [ ]Delirium [ ]Agitation [ ]Other  HEENT:  [x]Normal   [ ]Dry mouth   [ ]ET Tube/Trach  [ ]Oral lesions  PULMONARY:   [x]Clear [ ]Tachypnea  [ ]Audible excessive secretions   [ ]Rhonchi        [ ]Right [ ]Left [ ]Bilateral  [ ]Crackles        [ ]Right [ ]Left [ ]Bilateral  [ ]Wheezing     [ ]Right [ ]Left [ ]Bilateral  [ ]Diminished BS [ ] Right [ ]Left [ ]Bilateral  CARDIOVASCULAR:    [x]Regular [ ]Irregular [ ]Tachy  [ ]Bobo [ ]Murmur [ ]Other  GASTROINTESTINAL:  [x]Soft  [ ]Distended   [x]+BS  [ ]Non tender [x ]Tender  [ ]PEG [ ]OGT/ NGT   Last BM: 9/26  GENITOURINARY:  [x]Normal [ ]Incontinent   [ ]Oliguria/Anuria   [ ]Shore  MUSCULOSKELETAL:   [ ]Normal   [x]Weakness  [ ]Bed/Wheelchair bound [ ]Edema  NEUROLOGIC:   [x]No focal deficits  [ ] Cognitive impairment  [ ] Dysphagia [ ]Dysarthria [ ] Paresis [ ]Other   SKIN:   [x]Normal  [ ]Rash   [ ]Pressure ulcer(s) [ ]y [ ]n present on admission    CRITICAL CARE:  [ ] Shock Present  [ ]Septic [ ]Cardiogenic [ ]Neurologic [ ]Hypovolemic  [ ]  Vasopressors [ ]  Inotropes   [ ]Respiratory failure present [ ]Mechanical ventilation [ ]Non-invasive ventilatory support [ ]High flow    [ ]Acute  [ ]Chronic [ ]Hypoxic  [ ]Hypercarbic [ ]Other  [ ]Other organ failure     LABS:                                             12.3   24.74 )-----------( 330      ( 23 Sep 2022 07:25 )             38.9 09-23    132<L>  |  91<L>  |  50<H>  ----------------------------<  165<H>  4.0   |  27  |  0.59    Ca    10.5      23 Sep 2022 07:17  Phos  2.4     09-23  Mg     2.2     09-23    TPro  6.9  /  Alb  2.9<L>  /  TBili  0.9  /  DBili  x   /  AST  31  /  ALT  30  /  AlkPhos  343<H>  09-23               RADIOLOGY & ADDITIONAL STUDIES: < from: MR Head w/wo IV Cont (09.14.22 @ 19:13) >  ACC: [id number] EXAM:  MR BRAIN WAW IC                          PROCEDURE DATE:  09/14/2022          INTERPRETATION:  CLINICAL INDICATION: Brain metastases. Altered mental   status.    TECHNIQUE: Multi-planar multi-sequential MR imaging of the brain was   performed before and after the intravenous administration of contrast.   7.5 ml of Gadavist IV contrast was administered.    COMPARISON: CT head 9/13/2022.    FINDINGS:  Numerous enhancing intracranial lesions throughout the bilateral cerebral   hemispheres and left cerebellum with surrounding edema. The largest   lesions measure 2.8 x 2.2 x 2.8 cm in the right occipital lobe (4-3), 2.7   x 2.0 x 2.4 cm in the right parietal lobe (4-13) and 2.4 x 1.7 x 1.6 cm   and the left inferior cerebellum (3-12). The left inferior cerebellar   lesion partially effaces the caudal aspect of the fourth ventricle and   left foramen of Luschka.    There is partial effacement of the right lateral ventricle secondary to   surrounding vasogenic edema. However there is mild prominence of the left   lateral ventricle with FLAIR hyperintense signal along the frontal and   occipital horns. Mild hydrocephalus with transependymal CSF flow cannot   be ruled out.     No acute infarct or intracranial hemorrhage identified. No extra-axial   fluid collections. The skull base flow voids are present.    The visualized intraorbital contents are normal. The imaged portions of   the paranasal sinuses are clear. The mastoid air cells are clear. The   visualized osseous structures, soft tissues and partially visualized   parotid glands appear normal.    IMPRESSION:    -Extensive intracranial metastatic disease with surrounding vasogenic   edema. Largest lesions are detailed above.    -Left inferior cerebellar lesion partially effaces the caudal aspect of   the fourth ventricle and left foramen Luschka, with possible associated   mild hydrocephalus.    --- End of Report ---    NIKKY LAM MD; Attending Radiologist  This document has been electronically signed. Sep 15 2022 10:08AM    < end of copied text >        Protein Calorie Malnutrition Present: [ ]mild [ ]moderate [ ]severe [ ]underweight [ ]morbid obesity  https://www.andeal.org/vault/2440/web/files/ONC/Table_Clinical%20Characteristics%20to%20Document%20Malnutrition-White%20JV%20et%20al%202012.pdf    Height (cm): 170.2 (09-16-22 @ 16:54)  Weight (kg): 55.3 (09-16-22 @ 16:54)  BMI (kg/m2): 19.1 (09-16-22 @ 16:54)    [ ]PPSV2 < or = 30%  [ ]significant weight loss [ ]poor nutritional intake [ ]anasarca[ ]Artificial Nutrition    Other REFERRALS:  [ ]Hospice  [ ]Child Life  [ ]Social Work  [ ]Case management [ ]Holistic Therapy     Goals of Care Document:   Indication for Geriatrics and Palliative Care Services/INTERVAL HPI: symptom management and GOC in setting of advanced malignancy  SUBJECTIVE AND OBJECTIVE: Pt seen and examined at bedside with daughter present. Pt intermittently requiring NRB for hypoxia this AM.     OVERNIGHT EVENTS: Required PRN PO oxycodone 10mg x2 and PO oxycodone 15mg x2 in last 24 hours.    DNR on chart:  Allergies    Levaquin (Flushing)    Intolerances    MEDICATIONS  (STANDING):  bisacodyl 5 milliGRAM(s) Oral at bedtime  dexAMETHasone     Tablet 4 milliGRAM(s) Oral every 6 hours  levETIRAcetam 500 milliGRAM(s) Oral two times a day  metoprolol succinate ER 50 milliGRAM(s) Oral daily  metoprolol succinate ER 25 milliGRAM(s) Oral once  senna 2 Tablet(s) Oral at bedtime    MEDICATIONS  (PRN):  oxyCODONE    IR 10 milliGRAM(s) Oral every 4 hours PRN Moderate Pain (4 - 6)  oxyCODONE    IR 15 milliGRAM(s) Oral every 4 hours PRN Severe Pain (7 - 10)    ITEMS UNCHECKED ARE NOT PRESENT    PRESENT SYMPTOMS: [ ]Unable to self-report - see [ ] CPOT [ ] PAINADS [ ] RDOS  Source if other than patient:  [ ]Family   [ ]Team     Pain: [x ]yes [ ]no  QOL impact - impacts ADLS  Location - epigastric region and right side of abdomen/flank region                   Aggravating factors - movement  Quality - throbbing  Radiation - none  Timing- constant   Severity (0-10 scale): 10  Minimal acceptable level (0-10 scale): 2-3      CPOT:    https://[facility].org/getattachment/[email or web address]/Critical-Care-Pain-Observation-Tool-(CPOT)    Dyspnea:                           [ ]Mild [ ]Moderate [ ]Severe  Anxiety:                             [ ]Mild [ ]Moderate [ ]Severe  Fatigue:                             [ ]Mild [ ]Moderate [ ]Severe  Nausea:                             [ ]Mild [ ]Moderate [ ]Severe  Loss of appetite:              [ ]Mild [ ]Moderate [ ]Severe  Constipation:                    [ ]Mild [ ]Moderate [ ]Severe    PCSSQ[Palliative Care Spiritual Screening Question]   Severity (0-10):  Score of 4 or > indicate consideration of Chaplaincy referral.  Chaplaincy Referral: [ ] yes [x ] refused [ ] following [ ] Deferred     Caregiver [place]? : [ ] yes [x ] no [ ] Deferred [ ] Declined             Social work referral [ ] Patient & Family Centered Care Referral [ ]     Anticipatory Grief present?:  [ ] yes [x ] no  [ ] Deferred                  Social work referral [ ] Chaplaincy Referral[ ]      Other Symptoms:  [x ]All other review of systems negative   [ ]Unable to obtain due to poor mentation    Palliative Performance Status Version 2:   30     %      http://npcrc.org/files/news/palliative_performance_scale_ppsv2.pdf  PHYSICAL EXAM:    Vital Signs Last 24 Hrs  T(C): 36.5 (26 Sep 2022 12:01), Max: 36.5 (26 Sep 2022 04:17)  T(F): 97.7 (26 Sep 2022 12:01), Max: 97.7 (26 Sep 2022 04:17)  HR: 105 (26 Sep 2022 12:01) (91 - 108)  BP: 143/80 (26 Sep 2022 12:01) (135/79 - 161/93)  BP(mean): --  RR: 22 (26 Sep 2022 12:38) (18 - 30)  SpO2: 93% (26 Sep 2022 12:38) (84% - 94%)    Parameters below as of 26 Sep 2022 12:38  Patient On (Oxygen Delivery Method): mask, nonrebreather  O2 Flow (L/min): 15      GENERAL:  [x]Alert  [x]Oriented x 2 (persion and place)  [ ]Lethargic  [ ]Cachexia  [ ]Unarousable  [x]Verbal  [ ]Non-Verbal  Behavioral:   [ ]Anxiety  [ ]Delirium [ ]Agitation [ ]Other  HEENT:  [x]Normal   [ ]Dry mouth   [ ]ET Tube/Trach  [ ]Oral lesions  PULMONARY:   [x]Clear [ ]Tachypnea  [ ]Audible excessive secretions   [ ]Rhonchi        [ ]Right [ ]Left [ ]Bilateral  [ ]Crackles        [ ]Right [ ]Left [ ]Bilateral  [ ]Wheezing     [ ]Right [ ]Left [ ]Bilateral  [ ]Diminished BS [ ] Right [ ]Left [ ]Bilateral  CARDIOVASCULAR:    [x]Regular [ ]Irregular [ ]Tachy  [ ]Bobo [ ]Murmur [ ]Other  GASTROINTESTINAL:  [x]Soft  [ ]Distended   [x]+BS  [ ]Non tender [x ]Tender  [ ]PEG [ ]OGT/ NGT   Last BM: 9/26  GENITOURINARY:  [x]Normal [ ]Incontinent   [ ]Oliguria/Anuria   [ ]Shore  MUSCULOSKELETAL:   [ ]Normal   [x]Weakness  [ ]Bed/Wheelchair bound [ ]Edema  NEUROLOGIC:   [x]No focal deficits  [ ] Cognitive impairment  [ ] Dysphagia [ ]Dysarthria [ ] Paresis [ ]Other   SKIN:   [x]Normal  [ ]Rash   [ ]Pressure ulcer(s) [ ]y [ ]n present on admission    CRITICAL CARE:  [ ] Shock Present  [ ]Septic [ ]Cardiogenic [ ]Neurologic [ ]Hypovolemic  [ ]  Vasopressors [ ]  Inotropes   [ ]Respiratory failure present [ ]Mechanical ventilation [ ]Non-invasive ventilatory support [ ]High flow    [ ]Acute  [ ]Chronic [ ]Hypoxic  [ ]Hypercarbic [ ]Other  [ ]Other organ failure     LABS:                                                12.1   26.32 )-----------( 286      ( 25 Sep 2022 07:08 )             39.3   09-25    131<L>  |  93<L>  |  28<H>  ----------------------------<  178<H>  4.4   |  25  |  0.42<L>    Ca    10.1      25 Sep 2022 07:06  Phos  2.1     09-25        RADIOLOGY & ADDITIONAL STUDIES: < from: MR Head w/wo IV Cont (09.14.22 @ 19:13) >  ACC: [id number] EXAM:  MR BRAIN WAW IC                          PROCEDURE DATE:  09/14/2022          INTERPRETATION:  CLINICAL INDICATION: Brain metastases. Altered mental   status.    TECHNIQUE: Multi-planar multi-sequential MR imaging of the brain was   performed before and after the intravenous administration of contrast.   7.5 ml of Gadavist IV contrast was administered.    COMPARISON: CT head 9/13/2022.    FINDINGS:  Numerous enhancing intracranial lesions throughout the bilateral cerebral   hemispheres and left cerebellum with surrounding edema. The largest   lesions measure 2.8 x 2.2 x 2.8 cm in the right occipital lobe (4-3), 2.7   x 2.0 x 2.4 cm in the right parietal lobe (4-13) and 2.4 x 1.7 x 1.6 cm   and the left inferior cerebellum (3-12). The left inferior cerebellar   lesion partially effaces the caudal aspect of the fourth ventricle and   left foramen of Luschka.    There is partial effacement of the right lateral ventricle secondary to   surrounding vasogenic edema. However there is mild prominence of the left   lateral ventricle with FLAIR hyperintense signal along the frontal and   occipital horns. Mild hydrocephalus with transependymal CSF flow cannot   be ruled out.     No acute infarct or intracranial hemorrhage identified. No extra-axial   fluid collections. The skull base flow voids are present.    The visualized intraorbital contents are normal. The imaged portions of   the paranasal sinuses are clear. The mastoid air cells are clear. The   visualized osseous structures, soft tissues and partially visualized   parotid glands appear normal.    IMPRESSION:    -Extensive intracranial metastatic disease with surrounding vasogenic   edema. Largest lesions are detailed above.    -Left inferior cerebellar lesion partially effaces the caudal aspect of   the fourth ventricle and left foramen Luschka, with possible associated   mild hydrocephalus.    --- End of Report ---    NIKKY LAM MD; Attending Radiologist  This document has been electronically signed. Sep 15 2022 10:08AM    < end of copied text >        Protein Calorie Malnutrition Present: [ ]mild [ ]moderate [ ]severe [ ]underweight [ ]morbid obesity  https://www.andeal.org/vault/2440/web/files/ONC/Table_Clinical%20Characteristics%20to%20Document%20Malnutrition-White%20JV%20et%20al%202012.pdf    Height (cm): 170.2 (09-16-22 @ 16:54)  Weight (kg): 55.3 (09-16-22 @ 16:54)  BMI (kg/m2): 19.1 (09-16-22 @ 16:54)    [ ]PPSV2 < or = 30%  [ ]significant weight loss [ ]poor nutritional intake [ ]anasarca[ ]Artificial Nutrition    Other REFERRALS:  [ ]Hospice  [ ]Child Life  [ ]Social Work  [ ]Case management [ ]Holistic Therapy     Goals of Care Document:

## 2022-09-26 NOTE — PROGRESS NOTE ADULT - PROBLEM SELECTOR PLAN 5
In the event of newly developing, evolving, or worsening symptoms, please contact the Palliative Medicine team via pager (if the patient is at [facility] #[phone] or if the patient is at [facility] #[phone]) The Geriatric and Palliative Medicine service has coverage 24 hours a day/ 7 days a week to provide medical recommendations regarding symptom management needs via telephone For acute issues or uncontrolled symptoms please page palliative team.    Lyn Villagomez MD  Geriatrics and Palliative Medicine Attending  [facility] pager: [phone]     The Geriatrics and Palliative Medicine consult service has 24/7 coverage for medical recommendations, including symptom management needs.

## 2022-09-26 NOTE — PROGRESS NOTE ADULT - PROBLEM SELECTOR PLAN 4
full code  goal is for palliative RT and CHRISTY full code  goal is for ongoing medical management  Pt and family wish for patient to go home with outpt palliative RT. They understand pt is not medically stable at this time for d/c home given need for NRB.   GOC discussions ongoing

## 2022-09-26 NOTE — PROGRESS NOTE ADULT - SUBJECTIVE AND OBJECTIVE BOX
afebrile    REVIEW OF SYSTEMS:  GEN: no fever,    no chills  RESP: no SOB,   no cough  CVS: no chest pain,   no palpitations  GI: no abdominal pain,   no nausea,   no vomiting,   no constipation,   no diarrhea  : no dysuria,   no frequency  NEURO: no headache,   no dizziness  PSYCH: no depression,   not anxious  Derm : no rash    MEDICATIONS  (STANDING):  bisacodyl 5 milliGRAM(s) Oral at bedtime  dexAMETHasone  Injectable 4 milliGRAM(s) IV Push four times a day  levETIRAcetam  IVPB 500 milliGRAM(s) IV Intermittent every 12 hours  metoprolol succinate ER 25 milliGRAM(s) Oral daily  senna 2 Tablet(s) Oral at bedtime    MEDICATIONS  (PRN):  oxyCODONE    IR 10 milliGRAM(s) Oral every 4 hours PRN Moderate Pain (4 - 6)  oxyCODONE    IR 15 milliGRAM(s) Oral every 4 hours PRN Severe Pain (7 - 10)      Vital Signs Last 24 Hrs  T(C): 36.5 (26 Sep 2022 04:17), Max: 36.5 (26 Sep 2022 04:17)  T(F): 97.7 (26 Sep 2022 04:17), Max: 97.7 (26 Sep 2022 04:17)  HR: 94 (26 Sep 2022 04:17) (83 - 99)  BP: 161/93 (26 Sep 2022 04:17) (139/83 - 161/93)  BP(mean): --  RR: 19 (26 Sep 2022 04:17) (18 - 19)  SpO2: 91% (26 Sep 2022 04:17) (88% - 97%)    Parameters below as of 26 Sep 2022 04:17  Patient On (Oxygen Delivery Method): nasal cannula  O2 Flow (L/min): 3    CAPILLARY BLOOD GLUCOSE        I&O's Summary    25 Sep 2022 07:01  -  26 Sep 2022 07:00  --------------------------------------------------------  IN: 100 mL / OUT: 0 mL / NET: 100 mL        PHYSICAL EXAM:  HEAD:  Atraumatic, Normocephalic  NECK: Supple, No   JVD  CHEST/LUNG:   no     rales,     no,    rhonchi  HEART: Regular rate and rhythm;         murmur  ABDOMEN: Soft, Nontender, ;   EXTREMITIES:    no    edema  NEUROLOGY:  alert    LABS:                        12.1   26.32 )-----------( 286      ( 25 Sep 2022 07:08 )             39.3     09-25    131<L>  |  93<L>  |  28<H>  ----------------------------<  178<H>  4.4   |  25  |  0.42<L>    Ca    10.1      25 Sep 2022 07:06  Phos  2.1     09-25                              Consultant(s) Notes Reviewed:      Care Discussed with Consultants/Other Providers:

## 2022-09-26 NOTE — PROGRESS NOTE ADULT - TIME BILLING
Total time spent including the following  [x]chart review and documentation  []review of medical literature related to pathogenesis, disease/illness progress, treatment and/or prognosis  []care coordination:  []discussion with the primary team:  []discussion with floor staff:  []discussion with consultant(s):  [x]discussion with the patient, surrogate decision maker, or family:  Time spent: > 35  min Symptom assessment and management, supportive counseling, coordination of care

## 2022-09-26 NOTE — PROGRESS NOTE ADULT - SUBJECTIVE AND OBJECTIVE BOX
CARDIOLOGY     PROGRESS  NOTE   ________________________________________________    CHIEF COMPLAINT:Patient is a 76y old  Female who presents with a chief complaint of ams/ sob (26 Sep 2022 07:23)  no complain.  	  REVIEW OF SYSTEMS:  CONSTITUTIONAL: No fever, weight loss, or fatigue  EYES: No eye pain, visual disturbances, or discharge  ENT:  No difficulty hearing, tinnitus, vertigo; No sinus or throat pain  NECK: No pain or stiffness  RESPIRATORY: No cough, wheezing, chills or hemoptysis; No Shortness of Breath  CARDIOVASCULAR: No chest pain, palpitations, passing out, dizziness, or leg swelling  GASTROINTESTINAL: No abdominal or epigastric pain. No nausea, vomiting, or hematemesis; No diarrhea or constipation. No melena or hematochezia.  GENITOURINARY: No dysuria, frequency, hematuria, or incontinence  NEUROLOGICAL: No headaches, memory loss, loss of strength, numbness, or tremors  SKIN: No itching, burning, rashes, or lesions   LYMPH Nodes: No enlarged glands  ENDOCRINE: No heat or cold intolerance; No hair loss  MUSCULOSKELETAL: No joint pain or swelling; No muscle, back, or extremity pain  PSYCHIATRIC: No depression, anxiety, mood swings, or difficulty sleeping  HEME/LYMPH: No easy bruising, or bleeding gums  ALLERGY AND IMMUNOLOGIC: No hives or eczema	    [ ] All others negative	  [ ] Unable to obtain    PHYSICAL EXAM:  T(C): 36.5 (09-26-22 @ 04:17), Max: 36.5 (09-26-22 @ 04:17)  HR: 94 (09-26-22 @ 04:17) (83 - 99)  BP: 161/93 (09-26-22 @ 04:17) (139/83 - 161/93)  RR: 19 (09-26-22 @ 04:17) (18 - 19)  SpO2: 91% (09-26-22 @ 04:17) (88% - 97%)  Wt(kg): --  I&O's Summary    25 Sep 2022 07:01  -  26 Sep 2022 07:00  --------------------------------------------------------  IN: 100 mL / OUT: 0 mL / NET: 100 mL        Appearance: Normal	  HEENT:   Normal oral mucosa, PERRL, EOMI	  Lymphatic: No lymphadenopathy  Cardiovascular: Normal S1 S2, No JVD, + murmurs, No edema  Respiratory: rhonchi  Gastrointestinal:  Soft, Non-tender, + BS	  Skin: No rashes, No ecchymoses, No cyanosis	  Neurologic: Non-focal  Extremities: Normal range of motion, No clubbing, cyanosis or edema  Vascular: Peripheral pulses palpable 2+ bilaterally    MEDICATIONS  (STANDING):  bisacodyl 5 milliGRAM(s) Oral at bedtime  dexAMETHasone     Tablet 4 milliGRAM(s) Oral every 6 hours  levETIRAcetam 500 milliGRAM(s) Oral two times a day  metoprolol succinate ER 25 milliGRAM(s) Oral daily  senna 2 Tablet(s) Oral at bedtime      TELEMETRY: 	    ECG:  	  RADIOLOGY:  OTHER: 	  	  LABS:	 	    CARDIAC MARKERS:                                12.1   26.32 )-----------( 286      ( 25 Sep 2022 07:08 )             39.3     09-25    131<L>  |  93<L>  |  28<H>  ----------------------------<  178<H>  4.4   |  25  |  0.42<L>    Ca    10.1      25 Sep 2022 07:06  Phos  2.1     09-25      proBNP: Serum Pro-Brain Natriuretic Peptide: 2287 pg/mL (09-13 @ 18:51)    Lipid Profile:   HgA1c:   TSH:         Assessment and plan  ---------------------------  75 yo F h/o colon CA with  mets was on   chemo ,  no longer on oral chemo x  past  1 mo p/w AMS x4 weeks, worsening over the last few days.     had also been c/o right sided rib pain for weeks as well.sob  on exertion x days.     pmd  d r brand   sent pt in for further evaluation as pt presented to her with AMS, worsening weakness and lethargy.     Per , pt slipped and fell in the bathroom while getting up from the toilet 3 weeks ago.    Unsure if she had any LOC. Has had unsteady gait since.      +intermittent episodes of diarrhea     Denies nausea, vomiting, fever, chills, cough, chest pain, blood in stool, urinary complaints, headache.   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  AC contraindicated  pt needs IVC filter any way despite doppler  echo in er noted with normal ef and bl pleural effusion with increase pro bnp  dc ivf  increase beta blocker for bp control, controlled now  PE on no AC sec to brain hemorrhagic mets  palliative care noted  ID noted with increase wbc ?sec to malignancy  increase metoprolol er to 50 mg daily as bp increases  oob to chair with assistance

## 2022-09-26 NOTE — PROGRESS NOTE ADULT - ASSESSMENT
75 yo F        h/o colon CA with  mets    no longer on oral chemo x  past  1 mo     p/w AMS x4 weeks, worsening over the last few days.  had  c/o right sided rib pain for weeks  and .sob  on exertion x days.     pmd  dr coffman   sent pt in ,  presented to her with AMS, worsening weakness and lethargy.     Per , pt slipped and fell in the bathroom while getting up from the toilet 3 weeks ago.       admitted   with  ams.  from cerebral   mets  with bleed.  able to  converse   h/o  metastatic Ca colon, oncologist dr jose enrique ignacio   N/S  eval, no intervention,   pt has no focal weakness   CT  chest angio. :  ,Pe, b/l pl effusions, hepatic mets /   T4 comp fx, with bony retropulsion,     TLSO  brace  per  N/S     pt  with +   PE/ R  leg  DVT,   unable to  a/c pt , given cerebral bleed/ family  aware of risks, s/p  IVC filter   wbc  noted. from malignancy  afebrile , , fall risk/ PT eval   oncology dr bullock  ,  on Decadron  for  h'gic mets, seen by  N/S         aware of  poor prognosis.  family   had  2  meetings  with palliative care,, and,  pt  remains  full code  rx  plan per  dr bullock  is  palliative  in nature,  RT  to  brain / agree  with  oncology  that,  pt  ideal  candidate  for  comfort  care/  hospice  spoke with  daughter ,  pt remains  full  code   per   N/S,  gammaknife   Rt,  a s an out pt     elevated  wbc  from decadron. pt is  afberile      daughter   wants  all done/  rpt ct  head  was  stable from 9/ 22    spoke  at length  with spouse  and daughter, clearly  stated  that  pt si  full code. and   that  family does not wish to pursue  any  more  meetings  and,   this  was  confirmed  by  conversation between   vahe lucio   and  daughter  also    and  dr bullock  has  spoken at length with daughter / daughter and  Rad  onc will  decide  on RT   daughter  has stated, that  per  Rad onc , Gamma knife   RT can only be  done as  an out pt  and hence , no need  for  transfer  and  also, she does  not want WBRT   pt  has improved, upon lowering pain meds. more  alert and  daughter in agreement  on  decdron, keppra, torpol, oxycodone  on prn pain meds  at  lower  dose. pt  is alert  and very  comfortable.  plan.   d/c to  rehab and  f/p  with  Rad  Onc       daughter , Alissa , asking clarification  from care cortn if transport can be arranged for  RT  from rehab      Alissa  [phone],        rad< from: CT Head No Cont (09.13.22 @ 18:46) >  RESSION:  Multiple hyperdense nodules are may represent hemorrhagic metastasis in a   patient with history of colon carcinoma. Further evaluation with contrast   enhanced brain MRI is recommended.  No acute intracranial hemorrhage.  --- End of Report ---       RAD< from: CT Abdomen and Pelvis w/ IV Cont (09.13.22 @ 18:48) >  IMPRESSION:  Segmental and subsegmental acute pulmonary arterial emboli as detailed   above. Presence of pulmonary embolism was discussed with Dr. Aguirre by Dr. Orta on September 13, 2022 at 8:03 PM.  Extensive metastatic disease as detailed above.  Small multiloculated bilateral pleural effusions.  Compression fracture deformity of the T4 vertebral body with some   retropulsion of the fractured vertebral body into the spinal canal.   Dedicated thoracic spine MRI can be performed for complete evaluation as   clinically warranted.  --- End of Report ---

## 2022-09-26 NOTE — PROGRESS NOTE ADULT - CONVERSATION DETAILS
Called patient's daughter for ongoing GOC discussion. Daughter has good understanding of patient's current condition. She stated that she spoke with rad onc team yesterday regarding options for RT and wishes for patient to receive cyber gamma knife as opposed to WBRT. She understands this can only be done as an outpatient and is working with CM to see if patient can be transport to [facility] from [facility]. We discussed patient's pain regimen and concern for sedation with medications. Daughter expressed wanting patient to be alert with pain well controlled. I explained that given patient's advanced disease process causing significant pain that we may not be able to achieve adequate pain control with patient remaining fully alert throughout the date. Daughter understanding and expressed priority being patient's pain control. Alissa in agreement with patient receiving opioid medications with holding parameters. At this time goal is to go to [facility] and f/u outpt for palliative RT.   Pt remains full code.
Met with patient and daughter at bedside for ongoing goals of care discussion. Pt and daughter understand that patient hypoxic this AM requiring NRB. Pt states she continues to have pain. We discussed concern for adverse effects including sedation/respiratory depression with escalating doses of opioids and addressed GOC. Discussed option for prioritizing patient's comfort and transitioning to symptom directed care vs ongoing medical management. We discussed ACP including CPR/intubation. Pt understands she is at risk for requiring intubation if respiratory status continues to worsen. We discussed patient's underlying malignancy and extensive metastatic disease. Explained to patient and daughter that unfortunately any treatment that patient could be a candidate for would be palliative in nature not curative. Explained that CPR/intubation would be an aggressive intervention that would not treat underlying malignancy and may cause more harm/suffering than benefit. Pt expressed wish to remain full code. MOLST form left with patient's daughter for family to review and have ongoing discussion.    HCP form completed with patient naming daughter Alissa Kelsey as primary agent. She declined to name alternate.     All questions answered and emotional support provided. CM and RN present during discussion.

## 2022-09-27 NOTE — PROGRESS NOTE ADULT - ASSESSMENT
75 yo F        h/o colon CA with  mets    no longer on oral chemo x  past  1 mo     p/w AMS x4 weeks, worsening over the last few days.  had  c/o right sided rib pain for weeks  and .sob  on exertion x days.     pmd  dr coffman   sent pt in ,  presented to her with AMS, worsening weakness and lethargy.     Per , pt slipped and fell in the bathroom while getting up from the toilet 3 weeks ago.       admitted   with  ams.  from cerebral   mets  with bleed.  able to  converse   h/o  metastatic Ca colon, oncologist dr jose enrique ignacio   N/S  eval, no intervention,   pt has no focal weakness   CT  chest angio. :  ,Pe, b/l pl effusions, hepatic mets /   T4 comp fx, with bony retropulsion,     TLSO  brace  per  N/S     pt  with +   PE/ R  leg  DVT,   unable to  a/c pt , given cerebral bleed/ family  aware of risks, s/p  IVC filter   wbc  noted. from malignancy  afebrile , , fall risk/ PT eval   oncology dr bullock  ,  on Decadron  for  h'gic mets, seen by  N/S         aware of  poor prognosis.  family   had  2  meetings  with palliative care,, and,  pt  remains  full code  rx  plan per  dr bullock  is  palliative  in nature,  RT  to  brain / agree  with  oncology  that,  pt  ideal  candidate  for  comfort  care/  hospice  spoke with  daughter ,  pt remains  full  code   per   N/S,  gammaknife   Rt,  a s an out pt     elevated  wbc  from decadron. pt is  afberile      daughter   wants  all done/  rpt ct  head  was  stable from 9/ 22    spoke  at length  with spouse  and daughter, clearly  stated  that  pt si  full code. and   that  family does not wish to pursue  any  more  meetings  and,   this  was  confirmed  by  conversation between   vahe lucio   and  daughter  also    and  dr bullock  has  spoken at length with daughter / daughter and  Rad  onc will  decide  on RT   daughter  has stated, that  per  Rad onc , Gamma knife   RT can only be  done as  an out pt  and hence , no need  for  transfer  and  also, she does  not want WBRT   pt  has improved, upon lowering pain meds. more  alert and  daughter in agreement  on  decdron, keppra, torpol, oxycodone  on prn pain meds  at  lower  dose. pt  is alert  and very  comfortable.  plan. was   d/c to  rehab and  f/p  with  Rad  Onc   now, plan is home, care  cortn note  seen    given  frail  nature,   advance d malignancy, re  readmission   likely.  pt remains  full code/  re  visited  by palliative  care also   d/c  order  written,  need s home  euipment       daughter , Alissa , asking clarification  from care cortn if transport can be arranged for  RT  from rehab      Alissa  [phone],        rad< from: CT Head No Cont (09.13.22 @ 18:46) >  RESSION:  Multiple hyperdense nodules are may represent hemorrhagic metastasis in a   patient with history of colon carcinoma. Further evaluation with contrast   enhanced brain MRI is recommended.  No acute intracranial hemorrhage.  --- End of Report ---       RAD< from: CT Abdomen and Pelvis w/ IV Cont (09.13.22 @ 18:48) >  IMPRESSION:  Segmental and subsegmental acute pulmonary arterial emboli as detailed   above. Presence of pulmonary embolism was discussed with Dr. Aguirre by Dr. Orta on September 13, 2022 at 8:03 PM.  Extensive metastatic disease as detailed above.  Small multiloculated bilateral pleural effusions.  Compression fracture deformity of the T4 vertebral body with some   retropulsion of the fractured vertebral body into the spinal canal.   Dedicated thoracic spine MRI can be performed for complete evaluation as   clinically warranted.  --- End of Report ---

## 2022-09-27 NOTE — PROGRESS NOTE ADULT - SUBJECTIVE AND OBJECTIVE BOX
afberile    REVIEW OF SYSTEMS:  GEN: no fever,    no chills  RESP: no SOB,   no cough  CVS: no chest pain,   no palpitations  GI: no abdominal pain,   no nausea,   no vomiting,   no constipation,   no diarrhea  : no dysuria,   no frequency  NEURO: no headache,   no dizziness  PSYCH: no depression,   not anxious  Derm : no rash    MEDICATIONS  (STANDING):  bisacodyl 5 milliGRAM(s) Oral at bedtime  dexAMETHasone     Tablet 4 milliGRAM(s) Oral every 6 hours  levETIRAcetam 500 milliGRAM(s) Oral two times a day  metoprolol succinate ER 75 milliGRAM(s) Oral daily  senna 2 Tablet(s) Oral at bedtime    MEDICATIONS  (PRN):  oxyCODONE    IR 10 milliGRAM(s) Oral every 4 hours PRN Moderate Pain (4 - 6)  oxyCODONE    IR 15 milliGRAM(s) Oral every 4 hours PRN Severe Pain (7 - 10)      Vital Signs Last 24 Hrs  T(C): 36.5 (27 Sep 2022 04:50), Max: 36.6 (26 Sep 2022 18:18)  T(F): 97.7 (27 Sep 2022 04:50), Max: 97.9 (26 Sep 2022 18:18)  HR: 103 (27 Sep 2022 04:50) (102 - 113)  BP: 141/85 (27 Sep 2022 04:50) (135/79 - 145/85)  BP(mean): --  RR: 18 (27 Sep 2022 04:50) (18 - 30)  SpO2: 93% (27 Sep 2022 04:50) (84% - 95%)    Parameters below as of 27 Sep 2022 04:50  Patient On (Oxygen Delivery Method): nasal cannula  O2 Flow (L/min): 6    CAPILLARY BLOOD GLUCOSE        I&O's Summary    26 Sep 2022 07:01  -  27 Sep 2022 07:00  --------------------------------------------------------  IN: 100 mL / OUT: 0 mL / NET: 100 mL        PHYSICAL EXAM:  HEAD:  Atraumatic, Normocephalic  NECK: Supple, No   JVD  CHEST/LUNG:   no     rales,     no,    rhonchi  HEART: Regular rate and rhythm;         murmur  ABDOMEN: Soft, Nontender, ;   EXTREMITIES:    no    edema  NEUROLOGY:  alert    LABS:                        12.1   26.32 )-----------( 286      ( 25 Sep 2022 07:08 )             39.3     09-25    131<L>  |  93<L>  |  28<H>  ----------------------------<  178<H>  4.4   |  25  |  0.42<L>    Ca    10.1      25 Sep 2022 07:06  Phos  2.1     09-25                              Consultant(s) Notes Reviewed:      Care Discussed with Consultants/Other Providers:

## 2022-09-27 NOTE — RAPID RESPONSE TEAM SUMMARY - NSSITUATIONBACKGROUNDRRT_GEN_ALL_CORE
76y woman with h/o metastatic colon cancer first diagnosed August 2021, was on oral chemotherapy with oncologist from [facility] Dr. Tawanna Goncalves, but has been off chemotherapy about one month now, prior h/o rectal RT at [facility], denies prior RT to the brain, came to the ED 9/13/22 for worsening AMS, near falls for inpatient work up. Since admission, pt diagnosed with new PE, however not able to be anticoagulated given extensive metastasis. Pt was ready to be discharged on home oxygen.   RRT called for hypoxia. SpO2 was 80s per RN report. Upon RRT arrival, pt on NRB satting low 90s. However, pt visibly utilizing accessory muscles, and appears to be in respiratory distress. CXR obtained with possible new RML opacity. Lasix 20mg IVP given due to c/f pleural effusion previously known during admission, and on exam with RLL decreased breath sounds.   Pt then uptitrated to HFNC given persistent respiratory distress. ABG with significant lactate elevation to 10.1, pH wnl however pCO2 26 likely respiratory compensation and CO2 was 15 as expected in metabolic lactic acidosis. PaO2 was 96 on 100% FiO2.   DDx: PE vs viral/bacterial PNA.  Trial of BiPAP deferred given poor mental status. Empiric broad spectrum abx given. No AC given contraindication as above. Discussion with family at this point established that pt is to be DNR DNI and comfort measures only.

## 2022-09-27 NOTE — PROGRESS NOTE ADULT - SUBJECTIVE AND OBJECTIVE BOX
CARDIOLOGY     PROGRESS  NOTE   ________________________________________________    CHIEF COMPLAINT:Patient is a 76y old  Female who presents with a chief complaint of ams/ sob (27 Sep 2022 07:04)  no complain.  	  REVIEW OF SYSTEMS:  CONSTITUTIONAL: No fever, weight loss, or fatigue  EYES: No eye pain, visual disturbances, or discharge  ENT:  No difficulty hearing, tinnitus, vertigo; No sinus or throat pain  NECK: No pain or stiffness  RESPIRATORY: No cough, wheezing, chills or hemoptysis; No Shortness of Breath  CARDIOVASCULAR: No chest pain, palpitations, passing out, dizziness, or leg swelling  GASTROINTESTINAL: No abdominal or epigastric pain. No nausea, vomiting, or hematemesis; No diarrhea or constipation. No melena or hematochezia.  GENITOURINARY: No dysuria, frequency, hematuria, or incontinence  NEUROLOGICAL: No headaches, memory loss, loss of strength, numbness, or tremors  SKIN: No itching, burning, rashes, or lesions   LYMPH Nodes: No enlarged glands  ENDOCRINE: No heat or cold intolerance; No hair loss  MUSCULOSKELETAL: No joint pain or swelling; No muscle, back, or extremity pain  PSYCHIATRIC: No depression, anxiety, mood swings, or difficulty sleeping  HEME/LYMPH: No easy bruising, or bleeding gums  ALLERGY AND IMMUNOLOGIC: No hives or eczema	    [ ] All others negative	  [ x] Unable to obtain    PHYSICAL EXAM:  T(C): 36.5 (09-27-22 @ 04:50), Max: 36.6 (09-26-22 @ 18:18)  HR: 103 (09-27-22 @ 04:50) (102 - 113)  BP: 141/85 (09-27-22 @ 04:50) (135/79 - 145/85)  RR: 18 (09-27-22 @ 04:50) (18 - 30)  SpO2: 93% (09-27-22 @ 04:50) (84% - 95%)  Wt(kg): --  I&O's Summary    26 Sep 2022 07:01  -  27 Sep 2022 07:00  --------------------------------------------------------  IN: 100 mL / OUT: 0 mL / NET: 100 mL        Appearance: Normal	  HEENT:   Normal oral mucosa, PERRL, EOMI	  Lymphatic: No lymphadenopathy  Cardiovascular: Normal S1 S2, No JVD,+ murmurs, No edema  Respiratory: rhonchi  Psychiatry:sleeping  Gastrointestinal:  Soft, Non-tender, + BS	  Skin: No rashes, No ecchymoses, No cyanosis	  Extremities: Normal range of motion, No clubbing, cyanosis or edema  Vascular: Peripheral pulses palpable 2+ bilaterally    MEDICATIONS  (STANDING):  bisacodyl 5 milliGRAM(s) Oral at bedtime  dexAMETHasone     Tablet 4 milliGRAM(s) Oral every 6 hours  levETIRAcetam 500 milliGRAM(s) Oral two times a day  metoprolol succinate ER 75 milliGRAM(s) Oral daily  senna 2 Tablet(s) Oral at bedtime      TELEMETRY: 	    ECG:  	  RADIOLOGY:  OTHER: 	  	  LABS:	 	    CARDIAC MARKERS:                  proBNP: Serum Pro-Brain Natriuretic Peptide: 2287 pg/mL (09-13 @ 18:51)    Lipid Profile:   HgA1c:   TSH:         Assessment and plan  ---------------------------  77 yo F h/o colon CA with  mets was on   chemo ,  no longer on oral chemo x  past  1 mo p/w AMS x4 weeks, worsening over the last few days.     had also been c/o right sided rib pain for weeks as well.sob  on exertion x days.     pmd  d r brand   sent pt in for further evaluation as pt presented to her with AMS, worsening weakness and lethargy.     Per , pt slipped and fell in the bathroom while getting up from the toilet 3 weeks ago.    Unsure if she had any LOC. Has had unsteady gait since.      +intermittent episodes of diarrhea     Denies nausea, vomiting, fever, chills, cough, chest pain, blood in stool, urinary complaints, headache.   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  AC contraindicated  pt needs IVC filter any way despite doppler  echo in er noted with normal ef and bl pleural effusion with increase pro bnp  dc ivf  increase beta blocker for bp control, controlled now  PE on no AC sec to brain hemorrhagic mets  palliative care noted  ID noted with increase wbc ?sec to malignancy  increase metoprolol er to 50 mg daily as bp increases  start on Protonix  while on dexamethasone

## 2022-09-28 NOTE — DISCHARGE NOTE FOR THE EXPIRED PATIENT - HOSPITAL COURSE
75yo female with h/o metastatic colon cancer first diagnosed August 2021, was on oral chemotherapy with oncologist from [facility] Dr. Tawanna Goncalves, off chemotherapy about one month; prior h/o rectal RT at [facility], denies prior RT to the brain, came to the ED 9/13/22 for worsening AMS, metastatic colon cancer near falls for inpatient w/u. Pt diagnosed with new PE, however not able to be anticoagulated given extensive metastasis, s/p multiple RRT's  called for hypotension with SBP in 40s. Family involved with Palliative Care and had significant worsening of symptoms 9/27 - 9/28. Confirmed with family  - spouse daughter and son comfort care measures only and no escalation of care. Pt. had worsening hypotension and minimal response with NRB. Palliative re-consulted, familyat beside and pt. pronounced with 0 HR, 0 breaths @1545 on 9/28/2022. Family notified and Dr. Ghotra aware.

## 2022-09-28 NOTE — PROGRESS NOTE ADULT - PROBLEM SELECTOR PROBLEM 2
Functional quadriplegia
Neoplasm related pain
Functional quadriplegia
Functional quadriplegia
Primary colon cancer with metastasis to other site
Functional quadriplegia

## 2022-09-28 NOTE — PROGRESS NOTE ADULT - PROBLEM SELECTOR PROBLEM 4
Palliative care encounter
Primary colon cancer with metastasis to other site
ACP (advance care planning)

## 2022-09-28 NOTE — RAPID RESPONSE TEAM SUMMARY - NSSITUATIONBACKGROUNDRRT_GEN_ALL_CORE
76y woman with h/o metastatic colon cancer first diagnosed August 2021, was on oral chemotherapy with oncologist from [facility] Dr. Tawanna Goncalves, but has been off chemotherapy about one month now, prior h/o rectal RT at [facility], denies prior RT to the brain, came to the ED 9/13/22 for worsening AMS, near falls for inpatient work up. Since admission, pt diagnosed with new PE, however not able to be anticoagulated given extensive metastasis.  RRT called due to hypotension with SBP in 40s. Family was called to confirm comfort care measures only and no escalation of care to start pressors, family agreed for comfort care measures. Bolus'ed pt's IVFs with improvement of BP in 80/60s. Family informed of this episode and encouraged to come to the hospital. Recommend IV pain medication as needed to help with any discomfort or agonal breathing. Primary team at bedside and informed of the plan.   76y woman with h/o metastatic colon cancer first diagnosed August 2021, was on oral chemotherapy with oncologist from [facility] Dr. Tawanna Goncalves, but has been off chemotherapy about one month now, prior h/o rectal RT at [facility], denies prior RT to the brain, came to the ED 9/13/22 for worsening AMS, near falls for inpatient work up. Since admission, pt diagnosed with new PE, however not able to be anticoagulated given extensive metastasis.  RRT called due to hypotension with SBP in 40s. Family was called to confirm comfort care measures only and no escalation of care to start pressors, family agreed for comfort care measures. Bolus'ed pt's IVFs with improvement of BP in 80/60s. Family informed of this episode and encouraged to come to the hospital. Recommend IV pain medication as needed to help with any discomfort or agonal breathing. Primary team at bedside and informed of the plan, to please place pt on comfort care order set.

## 2022-09-28 NOTE — PROGRESS NOTE ADULT - PROBLEM SELECTOR PLAN 2
PPS 40%  unsteady gait for approx 1 month, mainly in bed   Pt requires assistance with ADLs  continue PT/supportive care.
PPS 30%  unsteady gait for approx 1 month, mainly in bed   Pt requires assistance with ADLs  continue PT/supportive care.
PPS 30%  unsteady gait for approx 1 month, mainly in bed   Pt requires assistance with ADLs  continue PT/supportive care.
PPS 40%  unsteady gait for approx 1 month, mainly in bed   Pt requires assistance with ADLs  continue PT/supportive care.
PPS 30%  unsteady gait for approx 1 month, mainly in bed   Pt requires assistance with ADLs  continue PT/supportive care.
Pt lethargic and unable to safely take PO medications  Recommend:  IV dilaudid 0.2mg q2h PRN moderate pain  IV dilaudid 0.5mg q2h PRN severe pain  bowel regimen
as per onc...  Prognosis is extremely poor.   ? benefit of brain RT.  RT consult to be called. I left message for patient's  to get more details and his input.  Extensive brain mets makes her have very poor prognosis in addition to high volume metastatic disease with prior treatments and poor PS.
PPS 40%  unsteady gait for approx 1 month, mainly in bed   Pt requires assistance with ADLs  continue PT/supportive care.
PPS 30%  unsteady gait for approx 1 month, mainly in bed   Pt requires assistance with ADLs  continue PT/supportive care.
PPS 30%  unsteady gait for approx 1 month, mainly in bed   Pt requires assistance with ADLs  continue PT/supportive care.

## 2022-09-28 NOTE — PROVIDER CONTACT NOTE (CHANGE IN STATUS NOTIFICATION) - BACKGROUND
PE, RLU DVT  unable to treat due to colon CA  Hx of colon CA mets to brain and COPD
PE, DVT -unable to treat due to colon CA  Hx of colon CA metastatic to brain

## 2022-09-28 NOTE — PROVIDER CONTACT NOTE (CRITICAL VALUE NOTIFICATION) - BACKGROUND
AMS   PE, DVT - unable to treatment at this time due to colon CA  Hx of colon CA metastatic to brain
AMS   PE, DVT - unable to treatment at this time due to colon CA  Hx of colon CA metastatic to brain

## 2022-09-28 NOTE — PROGRESS NOTE ADULT - REASON FOR ADMISSION
Brain mets
ams/ sob

## 2022-09-28 NOTE — PROGRESS NOTE ADULT - PROBLEM SELECTOR PLAN 6
Pt is not stable for transfer to inpatient hospice due to hypotension and hypoxia requiring NRB.  Pt pending transfer to PCU when bed available.  Discussed with pt's HCP Tosha Villagomez MD  Geriatrics and Palliative Medicine Attending  [facility] pager: [phone]     The Geriatrics and Palliative Medicine consult service has 24/7 coverage for medical recommendations, including symptom management needs.

## 2022-09-28 NOTE — PROGRESS NOTE ADULT - PROBLEM SELECTOR PLAN 3
Pt followed by Dr. Goncalves at [facility]   Oncology consult appreciated  rad/onc consult appreciated-plan for outpatient RT
Pt followed by Dr. Goncalves at [facility]   Oncology consult appreciated  rad/onc consult appreciated-plan for outpatient RT
Pt followed by Dr. Goncalves at [facility]   Oncology consult appreciated  rad/onc consult appreciated-plan for outpatient RT
Pt followed by Dr. Goncalves at [facility]   Oncology consult appreciated  rad/onc consult appreciated - family declines transfer to [facility] for WBRT. prefers outpt f/u for cyber knife
Pt followed by Dr. Goncalves at [facility]   Oncology consult appreciated  rad/onc consult appreciated - family declines transfer to [facility] for WBRT. prefers outpt f/u for cyber knife
Pt followed by Dr. Goncalves at [facility]   Oncology consult appreciated  rad/onc consult appreciated - family declines transfer to [facility] for WBRT. prefers outpt f/u for cyber knife
pt requiring full supportive care with ADL's
PPS 10%  Pt requires total care
Pt followed by Dr. Goncalves at [facility]   Oncology consult appreciated  rad/onc consult appreciated-plan for outpatient RT
Pt followed by Dr. Goncalves at [facility]   Oncology consult appreciated  rad/onc consult appreciated - family declines transfer to [facility] for WBRT. prefers outpt f/u for cyber knife

## 2022-09-28 NOTE — PROGRESS NOTE ADULT - SUBJECTIVE AND OBJECTIVE BOX
nighat, events  noted    REVIEW OF SYSTEMS:  GEN: no fever,    no chills  RESP: no SOB,   no cough  CVS: no chest pain,   no palpitations  GI: no abdominal pain,   no nausea,   no vomiting,   no constipation,   no diarrhea  : no dysuria,   no frequency  NEURO: no headache,   no dizziness  PSYCH: no depression,   not anxious  Derm : no rash    MEDICATIONS  (STANDING):  dexAMETHasone  Injectable 4 milliGRAM(s) IV Push four times a day  dextrose 5% + sodium chloride 0.9%. 1000 milliLiter(s) (80 mL/Hr) IV Continuous <Continuous>  levETIRAcetam  IVPB 500 milliGRAM(s) IV Intermittent every 12 hours  piperacillin/tazobactam IVPB.. 3.375 Gram(s) IV Intermittent every 8 hours    MEDICATIONS  (PRN):  HYDROmorphone  Injectable 0.2 milliGRAM(s) IV Push every 2 hours PRN Dyspnea  LORazepam   Injectable 1 milliGRAM(s) IV Push every 4 hours PRN Anxiety      Vital Signs Last 24 Hrs  T(C): 36.7 (28 Sep 2022 05:08), Max: 36.7 (28 Sep 2022 05:08)  T(F): 98 (28 Sep 2022 05:08), Max: 98 (28 Sep 2022 05:08)  HR: 88 (28 Sep 2022 05:08) (88 - 101)  BP: 58/36 (28 Sep 2022 05:08) (58/36 - 148/91)  BP(mean): --  RR: 20 (28 Sep 2022 05:08) (18 - 20)  SpO2: 93% (28 Sep 2022 05:08) (84% - 93%)    Parameters below as of 28 Sep 2022 05:08  Patient On (Oxygen Delivery Method): mask, nonrebreather    O2 Concentration (%): 100  CAPILLARY BLOOD GLUCOSE      POCT Blood Glucose.: 253 mg/dL (28 Sep 2022 00:22)  POCT Blood Glucose.: 188 mg/dL (27 Sep 2022 19:30)    I&O's Summary    27 Sep 2022 07:01  -  28 Sep 2022 07:00  --------------------------------------------------------  IN: 50 mL / OUT: 0 mL / NET: 50 mL        PHYSICAL EXAM:  HEAD:  Atraumatic, Normocephalic  NECK: Supple, No   JVD  CHEST/LUNG:   no     rales,     no,    rhonchi  HEART: Regular rate and rhythm;         murmur  ABDOMEN: Soft, Nontender, ;   EXTREMITIES:        edema  NEUROLOGY:  obtunde    LABS:                        14.4   20.19 )-----------( 223      ( 27 Sep 2022 22:14 )             47.6     09-27    130<L>  |  90<L>  |  62<H>  ----------------------------<  270<H>  6.2<HH>   |  14<L>  |  1.02    Ca    11.3<H>      27 Sep 2022 22:14              Lactate, Blood: 12.6 mmol/L (09-27 @ 22:14)    ABG - ( 27 Sep 2022 20:04 )  pH, Arterial: 7.38  pH, Blood: x     /  pCO2: 26    /  pO2: 96    / HCO3: 15    / Base Excess: -7.9  /  SaO2: 98.1                          Consultant(s) Notes Reviewed:      Care Discussed with Consultants/Other Providers:

## 2022-09-28 NOTE — PROGRESS NOTE ADULT - PROBLEM SELECTOR PROBLEM 3
Primary colon cancer with metastasis to other site
Functional quadriplegia
Functional quadriplegia
Primary colon cancer with metastasis to other site
English

## 2022-09-28 NOTE — CHART NOTE - NSCHARTNOTEFT_GEN_A_CORE
Called that pt's pain was not controlled on current regimen of Oxycodone 15/20mg q6h prn mod/severe pain. Pt was requesting PRNs q4h.  - Increased Oxycodone 15mg PRN mod pain from q6h to q4h   - Increased Oxycodone 20mg PRN mod pain from q6h to q4h  - Monitor for decreased mental status/respiratory depression.  - Bowel regimen while receiving opioids.
MRI brain with multiple brain mets: recommend Rad onc for radiosurgery. Please have patient FU with Dr. Nathan after DC
Medicine NP Episodic Note    Pt. s/p RRT x 2 overnight for hypoxia and hypotension.  Please refer to RRT sheets/notes for full details.  GOC was initiated w/ family, pt. made DNR/DNI  RRT had follow-up conversation w/ family during second RRT and overall goal is comfort measures only w/ no escalation of care to start pressors support  Pt. ordered for Dilaudid 0.2 mg q 2 hrs PRN for symptomatic management of dyspnea  Will reconsult palliative in am for smooth transitioning (pt's  and daughter wants pt. not to suffer and be in pain)    Will endorse to primary team in am.  Attending to follow.    Lorena Mirza, [facility]  [phone]
NP Note - Patient is in need of home oxygen as her O2 sat is 84% on room air at rest and 93% on oxygen 2 liters nasal cannula.  RADHA Em NP
Palliative following for GOC/symptoms.  Chart reviewed. Plan is for d/c home today per primary team.  See GOC note 9/26. Goals are for patient to be d/c to home with outpatient RT. Pt remains full code.   Pt has f/u outpatient palliative appt with Dr. Kelly on 9/28 at 10:30AM.    Please discharge patient with 3 days of following medications:  Oxycodone 10mg q4h PRN moderate pain  Oxycodone 15mg q4h PRN moderate pain  bowel regimen    Patients with 50mg OME have 4x risk of OD and those with 100mg OME have 9x risk (prescribetoprevent.org)    Please prescribe the following:  Inpatient: Naoloxone 0.1mg IV q3 minutes PRN  for respiratory depression (RR < 11) or oversedation due to opioids. (Max - 4 doses)  Upon discharge: Naloxone 4mg/0.1 ml nasal spray, Spray 0.1mL into one nostril. Repeat with second spray into other nostril after 2-3 minutes if no or minimal response. (http://prescribetoprevent.org/prescribers/palliative/). Please be sure the patient's pharmacy has the medication, otherwise, be sure there is a pharmacy where the patient can get the Naloxone inhaled.    Please provide patient education prior to discharge. Resource: https://www.health.[place].gov/diseases/aids/general/opioid_overdose_prevention/overdose_facts.ht    For acute issues or uncontrolled symptoms please page palliative team.    Lyn Villagomez MD  Geriatrics and Palliative Medicine Attending  [facility] pager: [phone]     The Geriatrics and Palliative Medicine consult service has 24/7 coverage for medical recommendations, including symptom management needs.
Patient seen and examined at bedside. Right groin hematoma stable.
Pt. with a diagnosis of metastatic colon cancer to brain, Pt. is desaturating to 84% on room air. Pt. will require 6l oxygen continuously .   Kimberly Drummond NP
77 y/o F with a PMH of colon CA with mets recently completed chemo tx, no longer on oral chemo x 1 month, p/w AMS x4 weeks, worsening over the last few days. Has also been c/o right sided rib pain for weeks as well. CT head concerning for hemorrhagic metastasis.    9/24 Initial bedside swallow evaluation: Pt p/w overtly functional oropharyngeal swallow function w/ a regular texture and thin liquid diet.   9/28 RRT x2 for hypotension and hypoxia. Now on NRB. Palliative> pt transitioned to comfort measures.    TODAY, diet monitor attempted to assess tolerance of diet, however, pt w/ acute change in medical condition and GOC reflect comfort measures.   Team agreeable for SLP services to sign off at this time. Please re-consult if clinically indicated and if in keeping w/ pt/family GOC.     D/W ROYCE Barragan    Speech Pathology  [facility]arian Rob[facility] *Teams preferred* ([id number])
Based on patients ongoing issues with deconditioning and generalized weakness secondary to patients diagnosis of  R 53.2. Patient will require a semi electric hospital bed. This is necessary to achieve positioning, elevation and head of bed needs to be elevated at least 30 degrees most of the time. Bed pillows and wedges have been tried and ruled out.   RADHA Em NP
Ms. Bucio is planned for outpatient gammaknife on 10/4/22 with Dr. Tran/Dr. Nathan; however, recent notes mention  patient has been weaker, declined, and found to be obtunded prompting discussion of hospice care also.   We were asked about transfer to [facility] for WBRT over gammaknife.    I spoke with the daughter, Alissa.  She does not want her mother transferred to [facility] for WBRT.  She prefers to meet with palliative care, have her mother remain at NS, until it be sorted out what is best for her mother:   being hospice care, vs the chance to have gammaknife RT as an outpatient if there is improvement of any underlying cause.
Patient scheduled for telehealth f/u appt on 10/17@1981 with Dr. Perea.
Patient was dispensed a TLSO rigid posterior panel extending from sacrococcygeal junction to the scapular spine with a soft apron front. The TLSO will stabilize and control the thoracic and lumbar spine, limit ROM, Safely protect the fracture site when OOB and facilitate healing of the T4 fracture. Jada was educated on the care use and function of the orthosis. Contact info was given to patient. All went without incident.   Lisandro White [facility]  [phone]

## 2022-09-28 NOTE — PROGRESS NOTE ADULT - PROBLEM SELECTOR PROBLEM 5
Palliative care encounter
ACP (advance care planning)
Palliative care encounter

## 2022-09-28 NOTE — CHART NOTE - NSCHARTNOTESELECT_GEN_ALL_CORE
Event Note
Event Note
IR f/u appt
Speech and Swallow
rad onc/Event Note
Event Note
Event Note
IR
Neurosurgery/Event Note
Palliative Medicine/Event Note
RRT - hypoxia/hypotension/Event Note
palliative care/Event Note

## 2022-09-28 NOTE — PROGRESS NOTE ADULT - SUBJECTIVE AND OBJECTIVE BOX
CARDIOLOGY     PROGRESS  NOTE   ________________________________________________    CHIEF COMPLAINT:Patient is a 76y old  Female who presents with a chief complaint of ams/ sob (27 Sep 2022 08:48)  no complain.  	  REVIEW OF SYSTEMS:  CONSTITUTIONAL: No fever, weight loss, or fatigue  EYES: No eye pain, visual disturbances, or discharge  ENT:  No difficulty hearing, tinnitus, vertigo; No sinus or throat pain  NECK: No pain or stiffness  RESPIRATORY: No cough, wheezing, chills or hemoptysis; No Shortness of Breath  CARDIOVASCULAR: No chest pain, palpitations, passing out, dizziness, or leg swelling  GASTROINTESTINAL: No abdominal or epigastric pain. No nausea, vomiting, or hematemesis; No diarrhea or constipation. No melena or hematochezia.  GENITOURINARY: No dysuria, frequency, hematuria, or incontinence  NEUROLOGICAL: No headaches, memory loss, loss of strength, numbness, or tremors  SKIN: No itching, burning, rashes, or lesions   LYMPH Nodes: No enlarged glands  ENDOCRINE: No heat or cold intolerance; No hair loss  MUSCULOSKELETAL: No joint pain or swelling; No muscle, back, or extremity pain  PSYCHIATRIC: No depression, anxiety, mood swings, or difficulty sleeping  HEME/LYMPH: No easy bruising, or bleeding gums  ALLERGY AND IMMUNOLOGIC: No hives or eczema	    [ ] All others negative	  [ x] Unable to obtain    PHYSICAL EXAM:  T(C): 36.7 (09-28-22 @ 05:08), Max: 36.7 (09-28-22 @ 05:08)  HR: 88 (09-28-22 @ 05:08) (88 - 101)  BP: 58/36 (09-28-22 @ 05:08) (58/36 - 148/91)  RR: 20 (09-28-22 @ 05:08) (18 - 20)  SpO2: 93% (09-28-22 @ 05:08) (84% - 93%)  Wt(kg): --  I&O's Summary    27 Sep 2022 07:01  -  28 Sep 2022 07:00  --------------------------------------------------------  IN: 50 mL / OUT: 0 mL / NET: 50 mL      MEDICATIONS  (STANDING):  dexAMETHasone  Injectable 4 milliGRAM(s) IV Push four times a day  dextrose 5% + sodium chloride 0.9%. 1000 milliLiter(s) (80 mL/Hr) IV Continuous <Continuous>  levETIRAcetam  IVPB 500 milliGRAM(s) IV Intermittent every 12 hours  piperacillin/tazobactam IVPB.. 3.375 Gram(s) IV Intermittent every 8 hours      TELEMETRY: 	    ECG:  	  RADIOLOGY:  OTHER: 	  	  LABS:	 	    CARDIAC MARKERS:                                14.4   20.19 )-----------( 223      ( 27 Sep 2022 22:14 )             47.6     09-27    130<L>  |  90<L>  |  62<H>  ----------------------------<  270<H>  6.2<HH>   |  14<L>  |  1.02    Ca    11.3<H>      27 Sep 2022 22:14      proBNP: Serum Pro-Brain Natriuretic Peptide: 2287 pg/mL (09-13 @ 18:51)    Lipid Profile:   HgA1c:   TSH:     Pt. s/p RRT x 2 overnight for hypoxia and hypotension.  Please refer to RRT sheets/notes for full details.  GOC was initiated w/ family, pt. made DNR/DNI  RRT had follow-up conversation w/ family during second RRT and overall goal is comfort measures only w/ no escalation of care to start pressors support  Pt. ordered for Dilaudid 0.2 mg q 2 hrs PRN for symptomatic management of dyspnea  Will reconsult palliative in am for smooth transitioning (pt's  and daughter wants pt. not to suffer and be in pain)    Assessment and plan  ---------------------------  77 yo F h/o colon CA with  mets was on   chemo ,  no longer on oral chemo x  past  1 mo p/w AMS x4 weeks, worsening over the last few days.     had also been c/o right sided rib pain for weeks as well.sob  on exertion x days.     pmd  d r brand   sent pt in for further evaluation as pt presented to her with AMS, worsening weakness and lethargy.     Per , pt slipped and fell in the bathroom while getting up from the toilet 3 weeks ago.    Unsure if she had any LOC. Has had unsteady gait since.      +intermittent episodes of diarrhea     Denies nausea, vomiting, fever, chills, cough, chest pain, blood in stool, urinary complaints, headache.   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  events has noted with severe hypoxia and hypotension, pt is dnr/dni, palliative care

## 2022-09-28 NOTE — PROVIDER CONTACT NOTE (CHANGE IN STATUS NOTIFICATION) - ASSESSMENT
Pt more lethargic, BP 43/33 on manual, pt on non-rebreather 100%.
Tachycardic, afebrile, BP wln.   Pt lethargic, RR 30.

## 2022-09-28 NOTE — PROGRESS NOTE ADULT - ASSESSMENT
75 yo F        h/o colon CA with  mets    no longer on oral chemo x  past  1 mo     p/w AMS x4 weeks, worsening over the last few days.  had  c/o right sided rib pain for weeks  and .sob  on exertion x days.     pmd  dr coffman   sent pt in ,  presented to her with AMS, worsening weakness and lethargy.     Per , pt slipped and fell in the bathroom while getting up from the toilet 3 weeks ago.       admitted   with  ams.  from cerebral   mets  with bleed.  able to  converse   h/o  metastatic Ca colon, oncologist dr jose enrique ignacio   N/S  eval, no intervention,   pt has no focal weakness   CT  chest angio. :  ,Pe, b/l pl effusions, hepatic mets /   T4 comp fx, with bony retropulsion,     TLSO  brace  per  N/S     pt  with +   PE/ R  leg  DVT,   unable to  a/c pt , given cerebral bleed/ family  aware of risks, s/p  IVC filter   wbc  noted. from malignancy  afebrile , , fall risk/ PT eval   oncology dr bullock  ,  on Decadron  for  h'gic mets, seen by  N/S         aware of  poor prognosis.  family   had  2  meetings  with palliative care,, and,  pt  remains  full code  rx  plan per  dr bullock  is  palliative  in nature,  RT  to  brain / agree  with  oncology  that,  pt  ideal  candidate  for  comfort  care/  hospice  spoke with  daughter ,  pt remains  full  code   per   N/S,  gammaknife   Rt,  a s an out pt     elevated  wbc  from decadron. pt is  afberile      daughter   wants  all done/  rpt ct  head  was  stable from 9/ 22    spoke  at length  with spouse  and daughter, clearly  stated  that  pt si  full code. and   that  family does not wish to pursue  any  more  meetings  and,   this  was  confirmed  by  conversation between   vahe lucio   and  daughter  also    and  dr bullock  has  spoken at length with daughter / daughter and  Rad  onc will  decide  on RT   daughter  has stated, that  per  Rad onc , Gamma knife   RT can only be  done as  an out pt  and hence , no need  for  transfer  and  also, she does  not want WBRT   pt  has improved, upon lowering pain meds. more  alert and  daughter in agreement  on  decdron, keppra, torpol, oxycodone  on prn pain meds  at  lower  dose. pt  was  alert  and very  comfortable.  plan. was   d/c to  rehab and  f/p  with  Rad  Onc   now, plan is home, care  cortn note  seen   events noted.  s/p rrt  times 2, hypotension.  ams/  low  sbp   pt is  dnr  now/  per  daughter,  opts  for comfort care now,  no  blood  draws   asking for  palliative   care  f/p,  well known to team,  in prn dilaudid/ ?  transfer  to  PCU   discussed  with team          Alissa  [phone],        rad< from: CT Head No Cont (09.13.22 @ 18:46) >  RESSION:  Multiple hyperdense nodules are may represent hemorrhagic metastasis in a   patient with history of colon carcinoma. Further evaluation with contrast   enhanced brain MRI is recommended.  No acute intracranial hemorrhage.  --- End of Report ---       RAD< from: CT Abdomen and Pelvis w/ IV Cont (09.13.22 @ 18:48) >  IMPRESSION:  Segmental and subsegmental acute pulmonary arterial emboli as detailed   above. Presence of pulmonary embolism was discussed with Dr. Aguirre by Dr. Orta on September 13, 2022 at 8:03 PM.  Extensive metastatic disease as detailed above.  Small multiloculated bilateral pleural effusions.  Compression fracture deformity of the T4 vertebral body with some   retropulsion of the fractured vertebral body into the spinal canal.   Dedicated thoracic spine MRI can be performed for complete evaluation as   clinically warranted.  --- End of Report ---

## 2022-09-28 NOTE — PROGRESS NOTE ADULT - SUBJECTIVE AND OBJECTIVE BOX
Indication for Geriatrics and Palliative Care Services/INTERVAL HPI: symptom management and GOC in setting of advanced malignancy  SUBJECTIVE AND OBJECTIVE: Pt seen and examined at bedside. Pt lethargic. Requiring NRB for hypoxia.    OVERNIGHT EVENTS: RRT x2 for hypotension and hypoxia. Pt transitioned to comfort measures.   DNR on chart:  Allergies    Levaquin (Flushing)    Intolerances    MEDICATIONS  (STANDING):  dexAMETHasone  Injectable 4 milliGRAM(s) IV Push four times a day  dextrose 5% + sodium chloride 0.9%. 1000 milliLiter(s) (80 mL/Hr) IV Continuous <Continuous>  levETIRAcetam  IVPB 500 milliGRAM(s) IV Intermittent every 12 hours  piperacillin/tazobactam IVPB.. 3.375 Gram(s) IV Intermittent every 8 hours    MEDICATIONS  (PRN):  HYDROmorphone  Injectable 0.2 milliGRAM(s) IV Push every 2 hours PRN Dyspnea  LORazepam   Injectable 1 milliGRAM(s) IV Push every 4 hours PRN Anxiety    ITEMS UNCHECKED ARE NOT PRESENT    PRESENT SYMPTOMS: [ ]Unable to self-report - see [ ] CPOT [ ] PAINADS [ ] RDOS  Source if other than patient:  [ ]Family   [ ]Team     Pain: [x ]yes [ ]no  QOL impact - impacts ADLS  Location - epigastric region and right side of abdomen/flank region                   Aggravating factors - movement  Quality - throbbing  Radiation - none  Timing- constant   Severity (0-10 scale): 10  Minimal acceptable level (0-10 scale): 2-3      CPOT:    https://www.[facility].org/getattachment/[email or web address]/Critical-Care-Pain-Observation-Tool-(CPOT)    Dyspnea:                           [ ]Mild [ ]Moderate [ ]Severe  Anxiety:                             [ ]Mild [ ]Moderate [ ]Severe  Fatigue:                             [ ]Mild [ ]Moderate [ ]Severe  Nausea:                             [ ]Mild [ ]Moderate [ ]Severe  Loss of appetite:              [ ]Mild [ ]Moderate [ ]Severe  Constipation:                    [ ]Mild [ ]Moderate [ ]Severe    PCSSQ[Palliative Care Spiritual Screening Question]   Severity (0-10):  Score of 4 or > indicate consideration of Chaplaincy referral.  Chaplaincy Referral: [ ] yes [x ] refused [ ] following [ ] Deferred     Caregiver [place]? : [ ] yes [x ] no [ ] Deferred [ ] Declined             Social work referral [ ] Patient & Family Centered Care Referral [ ]     Anticipatory Grief present?:  [ ] yes [x ] no  [ ] Deferred                  Social work referral [ ] Chaplaincy Referral[ ]      Other Symptoms:  [ ]All other review of systems negative   [x ]Unable to obtain due to poor mentation    Palliative Performance Status Version 2:   10     %      [email or web address]  PHYSICAL EXAM:    Vital Signs Last 24 Hrs  T(C): 36.7 (28 Sep 2022 05:08), Max: 36.7 (28 Sep 2022 05:08)  T(F): 98 (28 Sep 2022 05:08), Max: 98 (28 Sep 2022 05:08)  HR: 88 (28 Sep 2022 05:08) (88 - 101)  BP: 58/36 (28 Sep 2022 05:08) (58/36 - 148/91)  BP(mean): --  RR: 20 (28 Sep 2022 05:08) (18 - 20)  SpO2: 93% (28 Sep 2022 05:08) (84% - 93%)    Parameters below as of 28 Sep 2022 05:08  Patient On (Oxygen Delivery Method): mask, nonrebreather    O2 Concentration (%): 100  Parameters below as of 26 Sep 2022 12:38  Patient On (Oxygen Delivery Method): mask, nonrebreather  O2 Flow (L/min): 15      GENERAL:  [ ]Alert  [ ]Oriented x 3 [x]Lethargic  [ ]Cachexia  [ ]Unarousable  [ ]Verbal  [x]Non-Verbal  Behavioral:   [ ]Anxiety  [ ]Delirium [ ]Agitation [ ]Other  HEENT:  [x]Normal   [ ]Dry mouth   [ ]ET Tube/Trach  [ ]Oral lesions  PULMONARY:   +NRB  [x]Clear [ ]Tachypnea  [ ]Audible excessive secretions   [ ]Rhonchi        [ ]Right [ ]Left [ ]Bilateral  [ ]Crackles        [ ]Right [ ]Left [ ]Bilateral  [ ]Wheezing     [ ]Right [ ]Left [ ]Bilateral  [ ]Diminished BS [ ] Right [ ]Left [ ]Bilateral  CARDIOVASCULAR:    [x]Regular [ ]Irregular [ ]Tachy  [ ]Bobo [ ]Murmur [ ]Other  GASTROINTESTINAL:  [x]Soft  [ ]Distended   [x]+BS  [ ]Non tender [x ]Tender  [ ]PEG [ ]OGT/ NGT   Last BM: 9/26  GENITOURINARY:  [x]Normal [ ]Incontinent   [ ]Oliguria/Anuria   [ ]Shore  MUSCULOSKELETAL:   [ ]Normal   [x]Weakness  [ ]Bed/Wheelchair bound [ ]Edema  NEUROLOGIC:   [x]No focal deficits  [ ] Cognitive impairment  [ ] Dysphagia [ ]Dysarthria [ ] Paresis [ ]Other   SKIN:   [x]Normal  [ ]Rash   [ ]Pressure ulcer(s) [ ]y [ ]n present on admission    CRITICAL CARE:  [ ] Shock Present  [ ]Septic [ ]Cardiogenic [ ]Neurologic [ ]Hypovolemic  [ ]  Vasopressors [ ]  Inotropes   [ ]Respiratory failure present [ ]Mechanical ventilation [ ]Non-invasive ventilatory support [ ]High flow    [ ]Acute  [ ]Chronic [ ]Hypoxic  [ ]Hypercarbic [ ]Other  [ ]Other organ failure     LABS:                                    14.4   20.19 )-----------( 223      ( 27 Sep 2022 22:14 )             47.6   09-27    130<L>  |  90<L>  |  62<H>  ----------------------------<  270<H>  6.2<HH>   |  14<L>  |  1.02    Ca    11.3<H>      27 Sep 2022 22:14          < from: Xray Chest 1 View- PORTABLE-Urgent (Xray Chest 1 View- PORTABLE-Urgent .) (09.27.22 @ 20:08) >    ******PRELIMINARY REPORT******      ******PRELIMINARY REPORT******       ACC: [id number] EXAM:  XR CHEST PORTABLE URGENT 1V                          PROCEDURE DATE:  09/27/2022    ******PRELIMINARY REPORT******      ******PRELIMINARY REPORT******           INTERPRETATION:  Scattered bilateral nodular opacities.  bilateral pleural effusions. No pneumothorax.  Overall, not significantly changed since the prior exam.        ******PRELIMINARY REPORT******      ******PRELIMINARY REPORT******        COBY PIZANO MD; Resident Radiologist  This document is a PRELIMINARY interpretation and is pending final   attending approval. Sep 27 2022  8:29PM    < end of copied text >    RADIOLOGY & ADDITIONAL STUDIES:       Protein Calorie Malnutrition Present: [ ]mild [ ]moderate [ ]severe [ ]underweight [ ]morbid obesity  https://www.andeal.org/vault/2440/web/files/ONC/Table_Clinical%20Characteristics%20to%20Document%20Malnutrition-White%20JV%20et%20al%202012.pdf    Height (cm): 170.2 (09-16-22 @ 16:54)  Weight (kg): 55.3 (09-16-22 @ 16:54)  BMI (kg/m2): 19.1 (09-16-22 @ 16:54)    [ ]PPSV2 < or = 30%  [ ]significant weight loss [ ]poor nutritional intake [ ]anasarca[ ]Artificial Nutrition    Other REFERRALS:  [ ]Hospice  [ ]Child Life  [ ]Social Work  [ ]Case management [ ]Holistic Therapy     Goals of Care Document:   Indication for Geriatrics and Palliative Care Services/INTERVAL HPI: symptom management and GOC in setting of advanced malignancy  SUBJECTIVE AND OBJECTIVE: Pt seen and examined at bedside. Pt lethargic. Requiring NRB for hypoxia.    OVERNIGHT EVENTS: RRT x2 for hypotension and hypoxia. Pt transitioned to comfort measures.   DNR on chart:  Allergies    Levaquin (Flushing)    Intolerances    MEDICATIONS  (STANDING):  dexAMETHasone  Injectable 4 milliGRAM(s) IV Push four times a day  dextrose 5% + sodium chloride 0.9%. 1000 milliLiter(s) (80 mL/Hr) IV Continuous <Continuous>  levETIRAcetam  IVPB 500 milliGRAM(s) IV Intermittent every 12 hours  piperacillin/tazobactam IVPB.. 3.375 Gram(s) IV Intermittent every 8 hours    MEDICATIONS  (PRN):  HYDROmorphone  Injectable 0.2 milliGRAM(s) IV Push every 2 hours PRN Dyspnea  LORazepam   Injectable 1 milliGRAM(s) IV Push every 4 hours PRN Anxiety    ITEMS UNCHECKED ARE NOT PRESENT    PRESENT SYMPTOMS: [ ]Unable to self-report - see [ ] CPOT [ ] PAINADS [ ] RDOS  Source if other than patient:  [ ]Family   [ ]Team     Pain: [x ]yes [ ]no  QOL impact - impacts ADLS  Location - epigastric region and right side of abdomen/flank region                   Aggravating factors - movement  Quality - throbbing  Radiation - none  Timing- constant   Severity (0-10 scale): 10  Minimal acceptable level (0-10 scale): 2-3      CPOT:    https://www.[facility].org/getattachment/[email or web address]/Critical-Care-Pain-Observation-Tool-(CPOT)    Dyspnea:                           [ ]Mild [ ]Moderate [ ]Severe  Anxiety:                             [ ]Mild [ ]Moderate [ ]Severe  Fatigue:                             [ ]Mild [ ]Moderate [ ]Severe  Nausea:                             [ ]Mild [ ]Moderate [ ]Severe  Loss of appetite:              [ ]Mild [ ]Moderate [ ]Severe  Constipation:                    [ ]Mild [ ]Moderate [ ]Severe    PCSSQ[Palliative Care Spiritual Screening Question]   Severity (0-10):  Score of 4 or > indicate consideration of Chaplaincy referral.  Chaplaincy Referral: [ ] yes [  ] refused [x] following [ ] Deferred     Caregiver [place]? : [ ] yes [x ] no [ ] Deferred [ ] Declined             Social work referral [ ] Patient & Family Centered Care Referral [ ]     Anticipatory Grief present?:  [ ] yes [x ] no  [ ] Deferred                  Social work referral [ ] Chaplaincy Referral[ ]      Other Symptoms:  [ ]All other review of systems negative   [x ]Unable to obtain due to poor mentation    Palliative Performance Status Version 2:   10     %      [email or web address]/[email or web address]_[email or web address]_[email or web address]  PHYSICAL EXAM:    Vital Signs Last 24 Hrs  T(C): 36.7 (28 Sep 2022 05:08), Max: 36.7 (28 Sep 2022 05:08)  T(F): 98 (28 Sep 2022 05:08), Max: 98 (28 Sep 2022 05:08)  HR: 88 (28 Sep 2022 05:08) (88 - 101)  BP: 58/36 (28 Sep 2022 05:08) (58/36 - 148/91)  BP(mean): --  RR: 20 (28 Sep 2022 05:08) (18 - 20)  SpO2: 93% (28 Sep 2022 05:08) (84% - 93%)    Parameters below as of 28 Sep 2022 05:08  Patient On (Oxygen Delivery Method): mask, nonrebreather    O2 Concentration (%): 100  Parameters below as of 26 Sep 2022 12:38  Patient On (Oxygen Delivery Method): mask, nonrebreather  O2 Flow (L/min): 15      GENERAL:  [ ]Alert  [ ]Oriented x 3 [x]Lethargic  [ ]Cachexia  [ ]Unarousable  [ ]Verbal  [x]Non-Verbal  Behavioral:   [ ]Anxiety  [ ]Delirium [ ]Agitation [ ]Other  HEENT:  [x]Normal   [ ]Dry mouth   [ ]ET Tube/Trach  [ ]Oral lesions  PULMONARY:   +NRB  [x]Clear [ ]Tachypnea  [ ]Audible excessive secretions   [ ]Rhonchi        [ ]Right [ ]Left [ ]Bilateral  [ ]Crackles        [ ]Right [ ]Left [ ]Bilateral  [ ]Wheezing     [ ]Right [ ]Left [ ]Bilateral  [ ]Diminished BS [ ] Right [ ]Left [ ]Bilateral  CARDIOVASCULAR:    [x]Regular [ ]Irregular [ ]Tachy  [ ]Bobo [ ]Murmur [ ]Other  GASTROINTESTINAL:  [x]Soft  [ ]Distended   [x]+BS  [ ]Non tender [x ]Tender  [ ]PEG [ ]OGT/ NGT   Last BM: 9/26  GENITOURINARY:  [x]Normal [ ]Incontinent   [ ]Oliguria/Anuria   [ ]Shore  MUSCULOSKELETAL:   [ ]Normal   [x]Weakness  [ ]Bed/Wheelchair bound [ ]Edema  NEUROLOGIC:   [x]No focal deficits  [ ] Cognitive impairment  [ ] Dysphagia [ ]Dysarthria [ ] Paresis [ ]Other   SKIN:   [x]Normal  [ ]Rash   [ ]Pressure ulcer(s) [ ]y [ ]n present on admission    CRITICAL CARE:  [ ] Shock Present  [ ]Septic [ ]Cardiogenic [ ]Neurologic [ ]Hypovolemic  [ ]  Vasopressors [ ]  Inotropes   [ ]Respiratory failure present [ ]Mechanical ventilation [ ]Non-invasive ventilatory support [ ]High flow    [ ]Acute  [ ]Chronic [ ]Hypoxic  [ ]Hypercarbic [ ]Other  [ ]Other organ failure     LABS:                                    14.4   20.19 )-----------( 223      ( 27 Sep 2022 22:14 )             47.6   09-27    130<L>  |  90<L>  |  62<H>  ----------------------------<  270<H>  6.2<HH>   |  14<L>  |  1.02    Ca    11.3<H>      27 Sep 2022 22:14          < from: Xray Chest 1 View- PORTABLE-Urgent (Xray Chest 1 View- PORTABLE-Urgent .) (09.27.22 @ 20:08) >    ******PRELIMINARY REPORT******      ******PRELIMINARY REPORT******       ACC: [id number] EXAM:  XR CHEST PORTABLE URGENT 1V                          PROCEDURE DATE:  09/27/2022    ******PRELIMINARY REPORT******      ******PRELIMINARY REPORT******           INTERPRETATION:  Scattered bilateral nodular opacities.  bilateral pleural effusions. No pneumothorax.  Overall, not significantly changed since the prior exam.        ******PRELIMINARY REPORT******      ******PRELIMINARY REPORT******        COBY PIZANO MD; Resident Radiologist  This document is a PRELIMINARY interpretation and is pending final   attending approval. Sep 27 2022  8:29PM    < end of copied text >    RADIOLOGY & ADDITIONAL STUDIES:       Protein Calorie Malnutrition Present: [ ]mild [ ]moderate [ ]severe [ ]underweight [ ]morbid obesity  https://www.andeal.org/vault/2440/web/files/ONC/Table_Clinical%20Characteristics%20to%20Document%20Malnutrition-White%20JV%20et%20al%202012.pdf    Height (cm): 170.2 (09-16-22 @ 16:54)  Weight (kg): 55.3 (09-16-22 @ 16:54)  BMI (kg/m2): 19.1 (09-16-22 @ 16:54)    [ ]PPSV2 < or = 30%  [ ]significant weight loss [ ]poor nutritional intake [ ]anasarca[ ]Artificial Nutrition    Other REFERRALS:  [ ]Hospice  [ ]Child Life  [ ]Social Work  [ ]Case management [ ]Holistic Therapy     Goals of Care Document:

## 2022-09-28 NOTE — PROGRESS NOTE ADULT - ASSESSMENT
77 y/o F with a PMH of colon CA with mets recently completed chemo tx no longer on oral chemo x 1 mo p/w AMS x4 weeks, worsening over the last few days. Has also been c/o right sided rib pain for weeks as well. CT head concerning for hemorrhagic metastasis. Palliative consulted for GOC and symptom management. Pt with RRT x2 overnight. Transitioned to comfort measures.

## 2022-10-04 ENCOUNTER — APPOINTMENT (OUTPATIENT)
Dept: MRI IMAGING | Facility: IMAGING CENTER | Age: 77
End: 2022-10-04

## 2022-10-04 ENCOUNTER — APPOINTMENT (OUTPATIENT)
Dept: SPINE | Facility: AMBULATORY SURGERY CENTER | Age: 77
End: 2022-10-04

## 2022-10-12 NOTE — PHYSICAL THERAPY INITIAL EVALUATION ADULT - BALANCE DISTURBANCE, IDENTIFIED IMPAIRMENT CONTRIBUTE, REHAB EVAL
pain/impaired postural control/decreased strength
General Sunscreen Counseling: I recommended a broad spectrum sunscreen with a SPF of 30 or higher.  I explained that SPF 30 sunscreens block approximately 97 percent of the sun's harmful rays.  Sunscreens should be applied at least 15 minutes prior to expected sun exposure and then every 2 hours after that as long as sun exposure continues. If swimming or exercising sunscreen should be reapplied every 45 minutes to an hour after getting wet or sweating.  One ounce, or the equivalent of a shot glass full of sunscreen, is adequate to protect the skin not covered by a bathing suit. I also recommended a lip balm with a sunscreen as well. Sun protective clothing can be used in lieu of sunscreen but must be worn the entire time you are exposed to the sun's rays.
Detail Level: Detailed

## 2022-10-17 ENCOUNTER — APPOINTMENT (OUTPATIENT)
Dept: INTERVENTIONAL RADIOLOGY/VASCULAR | Facility: CLINIC | Age: 77
End: 2022-10-17

## 2022-11-02 NOTE — ED ADULT NURSE NOTE - CAS TRG GENERAL NORM CIRC DET
Number Of Freeze-Thaw Cycles: 2 freeze-thaw cycles Detail Level: Detailed Render Note In Bullet Format When Appropriate: No Post-Care Instructions: I reviewed with the patient in detail post-care instructions. Patient is to wear sunprotection, and avoid picking at any of the treated lesions. Pt may apply Vaseline to crusted or scabbing areas. Consent: The patient's consent was obtained including but not limited to risks of crusting, scabbing, blistering, scarring, darker or lighter pigmentary change, recurrence, incomplete removal and infection. Show Applicator Variable?: Yes Duration Of Freeze Thaw-Cycle (Seconds): 5 Strong peripheral pulses

## 2023-04-24 NOTE — PATIENT PROFILE ADULT - ABILITY TO HEAR (WITH HEARING AID OR HEARING APPLIANCE IF NORMALLY USED):
Mildly to Moderately Impaired: difficulty hearing in some environments or speaker may need to increase volume or speak distinctly
Continue Regimen: alclometasone 0.05 % topical cream \\nQuantity: 45.0 g  Days Supply: 30\\nSig: Apply a thin layer to AA on face eye lids and nose BID
Detail Level: Zone
Render In Strict Bullet Format?: No

## 2023-10-30 NOTE — PATIENT PROFILE ADULT - FOOD INSECURITY
VTAMA Counseling: I discussed with the patient that VTAMA is not for use in the eyes, mouth or mouth. They should call the office if they develop any signs of allergic reactions to VTAMA. The patient verbalized understanding of the proper use and possible adverse effects of VTAMA.  All of the patient's questions and concerns were addressed. no

## 2023-11-03 NOTE — PROGRESS NOTE ADULT - PROBLEM SELECTOR PROBLEM 1
Neoplasm related pain
Acute dyspnea
Neoplasm related pain
Neoplasm related pain
(M6) obeys commands
Neoplasm related pain